# Patient Record
Sex: FEMALE | Race: WHITE | NOT HISPANIC OR LATINO | Employment: OTHER | URBAN - METROPOLITAN AREA
[De-identification: names, ages, dates, MRNs, and addresses within clinical notes are randomized per-mention and may not be internally consistent; named-entity substitution may affect disease eponyms.]

---

## 2017-07-06 ENCOUNTER — TELEPHONE (OUTPATIENT)
Dept: CARDIOLOGY | Facility: CLINIC | Age: 67
End: 2017-07-06

## 2017-07-06 NOTE — TELEPHONE ENCOUNTER
Pt's device is not in our [facility], active or inactive.  LMOM for Sasha @ [facility]. WE do have record of in-office checks.  Most recent 10/25/16.

## 2017-08-09 RX ORDER — FUROSEMIDE 80 MG
80 TABLET ORAL DAILY
Qty: 90 TABLET | Refills: 2 | Status: SHIPPED | OUTPATIENT
Start: 2017-08-09

## 2017-12-21 ENCOUNTER — OFFICE VISIT (OUTPATIENT)
Dept: RETAIL CLINIC | Facility: CLINIC | Age: 67
End: 2017-12-21

## 2017-12-21 VITALS
SYSTOLIC BLOOD PRESSURE: 146 MMHG | HEIGHT: 61 IN | HEART RATE: 72 BPM | TEMPERATURE: 98.1 F | DIASTOLIC BLOOD PRESSURE: 82 MMHG | BODY MASS INDEX: 30.78 KG/M2 | RESPIRATION RATE: 24 BRPM | WEIGHT: 163 LBS | OXYGEN SATURATION: 98 %

## 2017-12-21 DIAGNOSIS — R06.02 SHORTNESS OF BREATH: ICD-10-CM

## 2017-12-21 DIAGNOSIS — R68.89 FLU-LIKE SYMPTOMS: Primary | ICD-10-CM

## 2017-12-21 DIAGNOSIS — R05.9 COUGHING: ICD-10-CM

## 2017-12-21 LAB
EXPIRATION DATE: NORMAL
FLUAV AG NPH QL: NEGATIVE
FLUBV AG NPH QL: NEGATIVE
INTERNAL CONTROL: NORMAL
Lab: NORMAL

## 2017-12-21 PROCEDURE — 99213 OFFICE O/P EST LOW 20 MIN: CPT | Performed by: NURSE PRACTITIONER

## 2017-12-21 PROCEDURE — 87804 INFLUENZA ASSAY W/OPTIC: CPT | Performed by: NURSE PRACTITIONER

## 2017-12-21 RX ORDER — ALBUTEROL SULFATE 2.5 MG/3ML
2.5 SOLUTION RESPIRATORY (INHALATION) ONCE
Status: SHIPPED | OUTPATIENT
Start: 2017-12-21

## 2017-12-21 NOTE — PROGRESS NOTES
Subjective   Ludy Stcok is a 67 y.o. female presents with cough, chest congestion, sinus pressure, and shortness of breath.  Patient states thinks may have pneumonia because she has had in the past. States has been using inhaler with no relief and has ran out of medicine for neb machine.    URI    This is a new problem. The current episode started 1 to 4 weeks ago. The problem has been gradually worsening. Maximum temperature: subjective, has not checked. The fever has been present for 1 to 2 days. Associated symptoms include congestion, coughing (productive thick yellow sputum), ear pain (bilateral ear pressure/pain), headaches, sinus pain and wheezing (can hear self wheezing). Pertinent negatives include no abdominal pain, chest pain, diarrhea, dysuria, nausea, neck pain, rash, rhinorrhea, sneezing, sore throat, swollen glands or vomiting. She has tried NSAIDs and inhaler use for the symptoms. The treatment provided mild relief.   Cough   This is a new problem. The current episode started 1 to 4 weeks ago. The problem has been gradually worsening. The cough is productive of sputum. Associated symptoms include chills, ear pain (bilateral ear pressure/pain), a fever (subjective), headaches, myalgias, nasal congestion, postnasal drip, shortness of breath and wheezing (can hear self wheezing). Pertinent negatives include no chest pain, hemoptysis, rash, rhinorrhea or sore throat. Nothing aggravates the symptoms. She has tried a beta-agonist inhaler and OTC cough suppressant for the symptoms. The treatment provided mild relief. Her past medical history is significant for pneumonia.   Shortness of Breath   This is a new problem. The current episode started in the past 7 days. The problem has been gradually worsening. Associated symptoms include ear pain (bilateral ear pressure/pain), a fever (subjective), headaches, sputum production and wheezing (can hear self wheezing). Pertinent negatives include no abdominal  pain, chest pain, hemoptysis, leg pain, leg swelling, neck pain, rash, rhinorrhea, sore throat, swollen glands, syncope or vomiting. Nothing aggravates the symptoms. She has tried beta agonist inhalers for the symptoms. The treatment provided no relief. Her past medical history is significant for pneumonia.        The following portions of the patient's history were reviewed and updated as appropriate: allergies, current medications, past family history, past medical history, past social history and past surgical history.    Review of Systems   Constitutional: Positive for chills and fever (subjective). Negative for unexpected weight change.   HENT: Positive for congestion, ear pain (bilateral ear pressure/pain), postnasal drip, sinus pain and sinus pressure. Negative for rhinorrhea, sneezing, sore throat, trouble swallowing and voice change.    Eyes: Negative.    Respiratory: Positive for cough (productive thick yellow sputum), sputum production, chest tightness, shortness of breath and wheezing (can hear self wheezing). Negative for hemoptysis.    Cardiovascular: Negative.  Negative for chest pain, palpitations, leg swelling and syncope.   Gastrointestinal: Negative.  Negative for abdominal pain, diarrhea, nausea and vomiting.   Genitourinary: Negative.  Negative for dysuria.   Musculoskeletal: Positive for myalgias. Negative for neck pain.   Skin: Negative for rash.   Neurological: Positive for headaches. Negative for dizziness, syncope, light-headedness and numbness.       Objective   Physical Exam   Constitutional: She is oriented to person, place, and time. She appears well-developed and well-nourished. She has a sickly appearance.   HENT:   Head: Normocephalic and atraumatic.   Right Ear: A middle ear effusion is present.   Left Ear: A middle ear effusion is present.   Nose: Mucosal edema present.   Mouth/Throat: Uvula is midline and mucous membranes are normal. Posterior oropharyngeal erythema (mild erythema  with PND) present. Tonsils are 0 on the right. Tonsils are 0 on the left. No tonsillar exudate.   Eyes: Conjunctivae and lids are normal. Pupils are equal, round, and reactive to light.   Neck: Normal range of motion.   Cardiovascular: Normal rate, regular rhythm and normal heart sounds.    Pulmonary/Chest: Tachypnea noted. She has decreased breath sounds in the right middle field, the right lower field, the left middle field and the left lower field. She has wheezes (audible wheezing noted, wheezing throughout lungs before and after neb tx) in the right upper field, the right middle field, the right lower field, the left upper field, the left middle field and the left lower field. She has rhonchi in the right middle field, the right lower field, the left middle field and the left lower field.   Neb treatment given with no improvement in lung sounds or air movement.  Patient in no distress in clinic, Sats 98% on RA   Lymphadenopathy:     She has no cervical adenopathy.   Neurological: She is alert and oriented to person, place, and time.   Skin: Skin is warm and dry.   Psychiatric: She has a normal mood and affect. Her speech is normal and behavior is normal.       Assessment/Plan   Ludy was seen today for uri, cough and shortness of breath.    Diagnoses and all orders for this visit:    Flu-like symptoms  -     POCT Influenza A/B  Results for orders placed or performed in visit on 12/21/17   POCT Influenza A/B   Result Value Ref Range    Rapid Influenza A Ag NEGATIVE     Rapid Influenza B Ag NEGATIVE     Internal Control Passed Passed    Lot Number [id number]     Expiration Date 09717          Shortness of breath  -     albuterol (PROVENTIL) nebulizer solution 0.083% 2.5 mg/3mL; Take 2.5 mg by nebulization 1 (One) Time.    Coughing    No medications prescribed except for neb tx given in clinic.  Due to patient's history, current presentation and assessment instructed on need to go to ER straight from clinic(patient  refused to go by ambulance, daughter here to transport).  Vital signs RR 24, HR 88, Sats 98% RA when leaving clinic.  12:30 MA walked with patient to car, daughter present, patient stable. Patient and daughter understand the importance of going straight to ER from clinic.  Called and spoke to Abena in ER regarding patient.    Tejal Sands, APRN

## 2022-08-20 ENCOUNTER — HOSPITAL ENCOUNTER (EMERGENCY)
Facility: HOSPITAL | Age: 72
Discharge: HOME | End: 2022-08-20
Attending: EMERGENCY MEDICINE | Admitting: EMERGENCY MEDICINE

## 2022-08-20 VITALS
HEIGHT: 60 IN | BODY MASS INDEX: 29.45 KG/M2 | SYSTOLIC BLOOD PRESSURE: 132 MMHG | WEIGHT: 150 LBS | OXYGEN SATURATION: 94 % | DIASTOLIC BLOOD PRESSURE: 80 MMHG | RESPIRATION RATE: 18 BRPM | HEART RATE: 70 BPM

## 2022-08-20 DIAGNOSIS — R25.2 LEG CRAMPS: Primary | ICD-10-CM

## 2022-08-20 LAB
ALBUMIN SERPL-MCNC: 4.4 G/DL (ref 3.5–5.2)
ALBUMIN/GLOB SERPL: 3.1 G/DL
ALP SERPL-CCNC: 69 U/L (ref 39–117)
ALT SERPL W P-5'-P-CCNC: 17 U/L (ref 1–33)
ANION GAP SERPL CALCULATED.3IONS-SCNC: 10 MMOL/L (ref 5–15)
AST SERPL-CCNC: 22 U/L (ref 1–32)
BASOPHILS # BLD AUTO: 0.08 10*3/MM3 (ref 0–0.2)
BASOPHILS NFR BLD AUTO: 1.2 % (ref 0–1.5)
BILIRUB SERPL-MCNC: 0.3 MG/DL (ref 0–1.2)
BUN SERPL-MCNC: 15 MG/DL (ref 8–23)
BUN/CREAT SERPL: 18.5 (ref 7–25)
CALCIUM SPEC-SCNC: 9.3 MG/DL (ref 8.6–10.5)
CHLORIDE SERPL-SCNC: 107 MMOL/L (ref 98–107)
CK SERPL-CCNC: 115 U/L (ref 20–180)
CO2 SERPL-SCNC: 26 MMOL/L (ref 22–29)
CREAT SERPL-MCNC: 0.81 MG/DL (ref 0.57–1)
DEPRECATED RDW RBC AUTO: 42.5 FL (ref 37–54)
EGFRCR SERPLBLD CKD-EPI 2021: 77.2 ML/MIN/1.73
EOSINOPHIL # BLD AUTO: 0.33 10*3/MM3 (ref 0–0.4)
EOSINOPHIL NFR BLD AUTO: 5.1 % (ref 0.3–6.2)
ERYTHROCYTE [DISTWIDTH] IN BLOOD BY AUTOMATED COUNT: 13 % (ref 12.3–15.4)
GLOBULIN UR ELPH-MCNC: 1.4 GM/DL
GLUCOSE SERPL-MCNC: 104 MG/DL (ref 65–99)
HCT VFR BLD AUTO: 39.4 % (ref 34–46.6)
HGB BLD-MCNC: 13.1 G/DL (ref 12–15.9)
HOLD SPECIMEN: NORMAL
IMM GRANULOCYTES # BLD AUTO: 0.01 10*3/MM3 (ref 0–0.05)
IMM GRANULOCYTES NFR BLD AUTO: 0.2 % (ref 0–0.5)
LYMPHOCYTES # BLD AUTO: 2.13 10*3/MM3 (ref 0.7–3.1)
LYMPHOCYTES NFR BLD AUTO: 33 % (ref 19.6–45.3)
MAGNESIUM SERPL-MCNC: 1.9 MG/DL (ref 1.6–2.4)
MCH RBC QN AUTO: 29.7 PG (ref 26.6–33)
MCHC RBC AUTO-ENTMCNC: 33.2 G/DL (ref 31.5–35.7)
MCV RBC AUTO: 89.3 FL (ref 79–97)
MONOCYTES # BLD AUTO: 0.72 10*3/MM3 (ref 0.1–0.9)
MONOCYTES NFR BLD AUTO: 11.1 % (ref 5–12)
NEUTROPHILS NFR BLD AUTO: 3.19 10*3/MM3 (ref 1.7–7)
NEUTROPHILS NFR BLD AUTO: 49.4 % (ref 42.7–76)
NRBC BLD AUTO-RTO: 0 /100 WBC (ref 0–0.2)
PLATELET # BLD AUTO: 277 10*3/MM3 (ref 140–450)
PMV BLD AUTO: 9.8 FL (ref 6–12)
POTASSIUM SERPL-SCNC: 3.9 MMOL/L (ref 3.5–5.2)
PROT SERPL-MCNC: 5.8 G/DL (ref 6–8.5)
QT INTERVAL: 470 MS
QTC INTERVAL: 507 MS
RBC # BLD AUTO: 4.41 10*6/MM3 (ref 3.77–5.28)
SODIUM SERPL-SCNC: 143 MMOL/L (ref 136–145)
TROPONIN T SERPL-MCNC: <0.01 NG/ML (ref 0–0.03)
WBC NRBC COR # BLD: 6.46 10*3/MM3 (ref 3.4–10.8)
WHOLE BLOOD HOLD COAG: NORMAL
WHOLE BLOOD HOLD SPECIMEN: NORMAL

## 2022-08-20 PROCEDURE — 84484 ASSAY OF TROPONIN QUANT: CPT | Performed by: EMERGENCY MEDICINE

## 2022-08-20 PROCEDURE — 93005 ELECTROCARDIOGRAM TRACING: CPT | Performed by: EMERGENCY MEDICINE

## 2022-08-20 PROCEDURE — 85025 COMPLETE CBC W/AUTO DIFF WBC: CPT | Performed by: EMERGENCY MEDICINE

## 2022-08-20 PROCEDURE — 99284 EMERGENCY DEPT VISIT MOD MDM: CPT

## 2022-08-20 PROCEDURE — 82550 ASSAY OF CK (CPK): CPT | Performed by: EMERGENCY MEDICINE

## 2022-08-20 PROCEDURE — 83735 ASSAY OF MAGNESIUM: CPT | Performed by: EMERGENCY MEDICINE

## 2022-08-20 PROCEDURE — 80053 COMPREHEN METABOLIC PANEL: CPT | Performed by: EMERGENCY MEDICINE

## 2022-08-20 RX ORDER — POTASSIUM CHLORIDE 750 MG/1
10 TABLET, FILM COATED, EXTENDED RELEASE ORAL 2 TIMES DAILY
Qty: 6 TABLET | Refills: 0 | Status: SHIPPED | OUTPATIENT
Start: 2022-08-20 | End: 2022-08-20 | Stop reason: SDUPTHER

## 2022-08-20 RX ORDER — POTASSIUM CHLORIDE 750 MG/1
20 CAPSULE, EXTENDED RELEASE ORAL ONCE
Status: COMPLETED | OUTPATIENT
Start: 2022-08-20 | End: 2022-08-20

## 2022-08-20 RX ORDER — POTASSIUM CHLORIDE 750 MG/1
10 TABLET, FILM COATED, EXTENDED RELEASE ORAL 2 TIMES DAILY
Qty: 6 TABLET | Refills: 0 | Status: SHIPPED | OUTPATIENT
Start: 2022-08-20 | End: 2022-08-23

## 2022-08-20 RX ORDER — GABAPENTIN 300 MG/1
300 CAPSULE ORAL 3 TIMES DAILY
COMMUNITY
End: 2023-03-22

## 2022-08-20 RX ORDER — MULTIPLE VITAMINS W/ MINERALS TAB 9MG-400MCG
1 TAB ORAL DAILY
COMMUNITY

## 2022-08-20 RX ORDER — SODIUM CHLORIDE 0.9 % (FLUSH) 0.9 %
10 SYRINGE (ML) INJECTION AS NEEDED
Status: DISCONTINUED | OUTPATIENT
Start: 2022-08-20 | End: 2022-08-20 | Stop reason: HOSPADM

## 2022-08-20 RX ORDER — BUPROPION HYDROCHLORIDE 300 MG/1
300 TABLET ORAL DAILY
Status: ON HOLD | COMMUNITY
End: 2023-03-30

## 2022-08-20 RX ADMIN — POTASSIUM CHLORIDE 20 MEQ: 750 CAPSULE, EXTENDED RELEASE ORAL at 10:57

## 2022-08-20 RX ADMIN — SODIUM CHLORIDE 500 ML: 9 INJECTION, SOLUTION INTRAVENOUS at 09:21

## 2022-08-20 NOTE — ED PROVIDER NOTES
Subjective   71-year-old female with a history of fibromyalgia presents for evaluation of leg cramps.  She states that her leg cramps started early this morning and were quite severe.  She states that she has had similar episodes before in the past with hypokalemia and dehydration.  She states that her legs became painful and were spasming uncontrollably to the point that she had to call EMS.  She was given IV fluids in route which seemed to help with her pain and her cramps.  She currently rates her pain at 8 out of 10 in severity but notes that it was 10 out of 10 in severity at its worst.  No paresthesias.  She states that the pain was so severe this morning that it caused her to fall to the ground, resulting in a superficial abrasion to the lateral aspect of her left lower leg.  Tetanus is up-to-date.          Review of Systems   Musculoskeletal: Positive for myalgias.   Skin: Positive for wound.   All other systems reviewed and are negative.      Past Medical History:   Diagnosis Date   • Coronary artery disease    • Crohn's disease, small and large intestine ([facility])    • DVT (deep venous thrombosis) ([facility]) 1995   • Fibromyalgia    • Heart disease    • Hypertension    • Parathyroid disease ([facility])     status post parathyroidectomy.   • Peptic ulcer disease    • PTSD (post-traumatic stress disorder)    • Pulmonary hypertension ([facility])    • Symptomatic bradycardia    • Systemic lupus erythematosus ([facility])        No Known Allergies    Past Surgical History:   Procedure Laterality Date   • BLADDER RESECTION LAPAROSCOPIC     • BREAST LUMPECTOMY     • BUNIONECTOMY      BILATERAL    • CARPAL TUNNEL RELEASE Right    • COLON RESECTION      Partial x2   • COSMETIC SURGERY      Bilateral breast implants   • HYSTERECTOMY     • TONSILLECTOMY         History reviewed. No pertinent family history.    Social History     Socioeconomic History   • Marital status:    Tobacco Use   • Smoking status: Never Smoker   Substance and  Sexual Activity   • Alcohol use: No   • Drug use: No   • Sexual activity: Defer           Objective   Physical Exam  Vitals and nursing note reviewed.   Constitutional:       Appearance: She is well-developed. She is not diaphoretic.      Comments: Nontoxic-appearing female   HENT:      Head: Normocephalic and atraumatic.      Comments: No mucous membrane lesions  Cardiovascular:      Rate and Rhythm: Normal rate and regular rhythm.      Heart sounds: Normal heart sounds. No murmur heard.    No friction rub. No gallop.   Pulmonary:      Effort: Pulmonary effort is normal. No respiratory distress.      Breath sounds: Normal breath sounds. No wheezing or rales.   Musculoskeletal:         General: Normal range of motion.      Right lower leg: No edema.      Left lower leg: No edema.   Skin:     General: Skin is warm and dry.      Findings: No erythema or rash.   Neurological:      Mental Status: She is alert and oriented to person, place, and time.      Comments: Neurovascularly intact distally in both lower extremities with bounding distal pulses and normal sensation, no clonus, normoreflexic   Psychiatric:         Mood and Affect: Mood normal.         Thought Content: Thought content normal.         Judgment: Judgment normal.         Procedures           ED Course  ED Course as of 08/20/22 1558   Sat Aug 20, 2022   0934 71-year-old female presents via EMS complaining of severe leg cramps this morning that she attributes to hypokalemia.  On arrival to the ED, the patient is nontoxic-appearing.  Benign exam.  We will obtain labs, and we will reassess following initial interventions. [DD]   1032 Labs are unrevealing.  Upon reevaluation, the patient looks and feels markedly improved following IV fluids.  Her potassium is on the low side of normal range.  Patient reassured.  Encouraged increased dietary potassium intake over the next several days.  We will discharge her home with a prescription for oral potassium  replacement for 3 days as well.  Agreeable with plan and given appropriate strict return precautions. [DD]      ED Course User Index  [DD] Garcia Velasquez MD                                          Recent Results (from the past 24 hour(s))   ECG 12 Lead    Collection Time: 08/20/22  9:16 AM   Result Value Ref Range    QT Interval 470 ms    QTC Interval 507 ms   Lavender Top    Collection Time: 08/20/22  9:20 AM   Result Value Ref Range    Extra Tube hold for add-on    Gray Top    Collection Time: 08/20/22  9:20 AM   Result Value Ref Range    Extra Tube Hold for add-ons.    Light Blue Top    Collection Time: 08/20/22  9:20 AM   Result Value Ref Range    Extra Tube Hold for add-ons.    CBC Auto Differential    Collection Time: 08/20/22  9:20 AM    Specimen: Blood   Result Value Ref Range    WBC 6.46 3.40 - 10.80 10*3/mm3    RBC 4.41 3.77 - 5.28 10*6/mm3    Hemoglobin 13.1 12.0 - 15.9 g/dL    Hematocrit 39.4 34.0 - 46.6 %    MCV 89.3 79.0 - 97.0 fL    MCH 29.7 26.6 - 33.0 pg    MCHC 33.2 31.5 - 35.7 g/dL    RDW 13.0 12.3 - 15.4 %    RDW-SD 42.5 37.0 - 54.0 fl    MPV 9.8 6.0 - 12.0 fL    Platelets 277 140 - 450 10*3/mm3    Neutrophil % 49.4 42.7 - 76.0 %    Lymphocyte % 33.0 19.6 - 45.3 %    Monocyte % 11.1 5.0 - 12.0 %    Eosinophil % 5.1 0.3 - 6.2 %    Basophil % 1.2 0.0 - 1.5 %    Immature Grans % 0.2 0.0 - 0.5 %    Neutrophils, Absolute 3.19 1.70 - 7.00 10*3/mm3    Lymphocytes, Absolute 2.13 0.70 - 3.10 10*3/mm3    Monocytes, Absolute 0.72 0.10 - 0.90 10*3/mm3    Eosinophils, Absolute 0.33 0.00 - 0.40 10*3/mm3    Basophils, Absolute 0.08 0.00 - 0.20 10*3/mm3    Immature Grans, Absolute 0.01 0.00 - 0.05 10*3/mm3    nRBC 0.0 0.0 - 0.2 /100 WBC   CK    Collection Time: 08/20/22  9:45 AM    Specimen: Blood   Result Value Ref Range    Creatine Kinase 115 20 - 180 U/L   Magnesium    Collection Time: 08/20/22  9:45 AM    Specimen: Blood   Result Value Ref Range    Magnesium 1.9 1.6 - 2.4 mg/dL   Comprehensive  "Metabolic Panel    Collection Time: 08/20/22  9:45 AM    Specimen: Blood   Result Value Ref Range    Glucose 104 (H) 65 - 99 mg/dL    BUN 15 8 - 23 mg/dL    Creatinine 0.81 0.57 - 1.00 mg/dL    Sodium 143 136 - 145 mmol/L    Potassium 3.9 3.5 - 5.2 mmol/L    Chloride 107 98 - 107 mmol/L    CO2 26.0 22.0 - 29.0 mmol/L    Calcium 9.3 8.6 - 10.5 mg/dL    Total Protein 5.8 (L) 6.0 - 8.5 g/dL    Albumin 4.40 3.50 - 5.20 g/dL    ALT (SGPT) 17 1 - 33 U/L    AST (SGOT) 22 1 - 32 U/L    Alkaline Phosphatase 69 39 - 117 U/L    Total Bilirubin 0.3 0.0 - 1.2 mg/dL    Globulin 1.4 gm/dL    A/G Ratio 3.1 g/dL    BUN/Creatinine Ratio 18.5 7.0 - 25.0    Anion Gap 10.0 5.0 - 15.0 mmol/L    eGFR 77.2 >60.0 mL/min/1.73   Troponin    Collection Time: 08/20/22  9:45 AM    Specimen: Blood   Result Value Ref Range    Troponin T <0.010 0.000 - 0.030 ng/mL   Green Top (Gel)    Collection Time: 08/20/22  9:45 AM   Result Value Ref Range    Extra Tube Hold for add-ons.    Gold Top - SST    Collection Time: 08/20/22  9:45 AM   Result Value Ref Range    Extra Tube Hold for add-ons.      Note: In addition to lab results from this visit, the labs listed above may include labs taken at another facility or during a different encounter within the last 24 hours. Please correlate lab times with ED admission and discharge times for further clarification of the services performed during this visit.    No orders to display     Vitals:    08/20/22 0911 08/20/22 0914 08/20/22 0930 08/20/22 1030   BP: 165/99  147/76 132/80   BP Location: Right arm      Patient Position: Lying      Pulse: 70  70 70   Resp: 18      SpO2: 96%  91% 94%   Weight:  68 kg (150 lb)     Height:  152.4 cm (60\")       Medications   sodium chloride 0.9 % bolus 500 mL (0 mL Intravenous Stopped 8/20/22 1045)   potassium chloride (MICRO-K) CR capsule 20 mEq (20 mEq Oral Given 8/20/22 1057)     ECG/EMG Results (last 24 hours)     ** No results found for the last 24 hours. **        ECG 12 " Lead   Final Result   Test Reason : Syncope triage protocol   Blood Pressure :   */*   mmHG   Vent. Rate :  70 BPM     Atrial Rate :  70 BPM      P-R Int : 190 ms          QRS Dur : 102 ms       QT Int : 470 ms       P-R-T Axes :  61 -32 184 degrees      QTc Int : 507 ms      Atrial-paced rhythm   Left axis deviation   ST & T wave abnormality, consider anterior ischemia   Abnormal ECG   When compared with ECG of 23-SEP-2015 12:15,   T wave inversion more evident in Anterior leads   Confirmed by MD Velasquez Michael ([id number]) on 8/20/2022 3:50:45 PM      Referred By: EDMD           Confirmed By: Cheo Velasquez MD              [facility]    Final diagnoses:   Leg cramps       ED Disposition  ED Disposition     ED Disposition   Discharge    Condition   Stable    Comment   --             PATIENT CONNECTION - [place] [address]  [phone]  In 1 week           Medication List      New Prescriptions    potassium chloride 10 MEQ CR tablet  Take 1 tablet by mouth 2 (Two) Times a Day for 3 days.           Where to Get Your Medications      These medications were sent to [facility] [address] [place] [address] [address] [facility] [phone] [place] [phone]   [address] [facility] [address]    Phone: [phone]   · potassium chloride 10 MEQ CR tablet          Garcia Velasquez MD  08/20/22 8118

## 2023-03-21 NOTE — PROGRESS NOTES
Subjective:     Encounter Date:03/22/2023    Primary Care Physician: Provider, No Known      Patient ID: Ludy Stock is a 72 y.o. female.    Chief Complaint:Establish Care (For last month having chest pain on and off), Chest Pain, and Shortness of Breath    PROBLEM LIST:   1. Symptomatic bradycardia:  a.  Status post dual-chamber [employer] Sawyer pacemaker, 01/04/2010.    b. Palpitations associated with syncopal episode in October 2009.  c. Event recorder, November 2009, revealing sinus bradycardia with heart rates as low as 38 (during waking hours).  d. 3/2023 generator at [facility]  2. Coronary artery disease:  a. History of cardiac catheterizations, 2000 and 2003, and 2011.  b. Reported PCI at [facility], 2011.  c. Pharmacologic MPS, 03/10/2015: Normal perfusion.   Normal LVEF.  d. 2009 3.5 x 32 stent to RCA performed in [facility]  e. 12/2022 normal MPS (Dr. Samayoa's office)  3. Pulmonary hypertension:  a. Secondary to Fen-Phen use.  b. Echocardiogram, 10/28/2009: Normal LVEF, RVSP 32 mmHg.  c. Echocardiogram,  03/10/2015:  Normal.  4. Hypertension  5. Dyslipidemia  6. Reported h/o Atrial fibrillation  1. On Eliquis, CHADSVASC 5  7. Right breast cancer  1. Treated with lumpectomy  8. Systemic lupus erythematosus.  9. Fibromyalgia.  10.  History of peptic ulcer disease.  11. Crohn’s disease.  12. History of DVT in 1995.  13. PTSD.  14. History of parathyroid disease, status post parathyroidectomy.  15. Chronic constipation  16. Nephrolithiasis  17. Hypothyroidism  18. Surgical history:  a. Partial colon resection x2.  b. Bilateral bunionectomy.  c. Tonsillectomy.  d. Hysterectomy.  e. Bladder resection.  f. Right carpal tunnel repair.  g. Benign  breast lumpectomy.  h. Bilateral breast implants  i. Cholecystectomy  j. Right rotator cuff repair      No Known Allergies      Current Outpatient Medications:   •  buPROPion XL (WELLBUTRIN XL) 300 MG 24 hr tablet, Take 1 tablet by mouth Daily., Disp: , Rfl:   •   Coenzyme Q10 (CoQ-10) 100 MG capsule, Take  by mouth Daily., Disp: , Rfl:   •  diclofenac (VOLTAREN) 1 % gel gel, Place  on the skin As Needed., Disp: , Rfl:   •  furosemide (LASIX) 80 MG tablet, Take 1 tablet by mouth Daily., Disp: 90 tablet, Rfl: 2  •  levothyroxine (SYNTHROID, LEVOTHROID) 175 MCG tablet, Take  by mouth Daily., Disp: , Rfl:   •  linaclotide (LINZESS) 290 MCG capsule capsule, Take 1 capsule by mouth Every Morning Before Breakfast., Disp: , Rfl:   •  lisinopril (PRINIVIL,ZESTRIL) 20 MG tablet, Take 1 tablet by mouth Daily., Disp: , Rfl:   •  metoprolol succinate XL (TOPROL-XL) 50 MG 24 hr tablet, Take 1 tablet by mouth Daily., Disp: 90 tablet, Rfl: 2  •  multivitamin with minerals tablet tablet, Take 1 tablet by mouth Daily., Disp: , Rfl:   •  nitroglycerin (NITROSTAT) 0.4 MG SL tablet, Place  under the tongue As Needed., Disp: , Rfl:   •  omeprazole (priLOSEC) 40 MG capsule, Take 1 capsule by mouth Daily., Disp: , Rfl:   •  oxyCODONE (OxyCONTIN) 10 MG 12 hr tablet, Take 1 tablet by mouth 3 (Three) Times a Day., Disp: , Rfl:   •  oxyMORphone HCl ER 7.5 MG tablet extended-release 12 hour, Take  by mouth 2 (Two) Times a Day., Disp: , Rfl:   •  potassium chloride (K-DUR,KLOR-CON) 20 MEQ CR tablet, Take 1 tablet by mouth As Needed., Disp: , Rfl:   •  aspirin 81 MG EC tablet, Take  by mouth Daily. (Patient not taking: Reported on 3/22/2023), Disp: , Rfl:     Current Facility-Administered Medications:   •  albuterol (PROVENTIL) nebulizer solution 0.083% 2.5 mg/3mL, 2.5 mg, Nebulization, Once, Tejal Sands, ESTHER        History of Present Illness    Patient is a 72-year-old  female who we are seeing today for establishment of cardiac care as well as management of her pacemaker.  Patient's previous history of coronary disease with previous stents.  She underwent her most recent intervention in 2019 with stent to the RCA performed in [place].  Patient notes that 2 years ago she was told that she  needed a heart catheterization.  Chest pain with any further testing secondary to caring for her  who is very ill and requires lots of care.  Complains of extreme fatigue as well as shortness of breath.  Within the last year or so this has been limiting her ability to take care of her .  Also complains of intermittent chest heaviness.  No specific triggering factors noted.  Notes that last night she was having a nightmare and during that she was having a heart attack and woke up with chest heaviness.  No reported syncope or near syncope.    The following portions of the patient's history were reviewed and updated as appropriate: allergies, current medications, past family history, past medical history, past social history, past surgical history and problem list.    Family History   Problem Relation Age of Onset   • Heart attack Father        Social History     Tobacco Use   • Smoking status: Never   Substance Use Topics   • Alcohol use: No   • Drug use: No         Review of Systems   Constitutional: Positive for malaise/fatigue and weight gain. Negative for fever.   HENT: Negative for hoarse voice, nosebleeds and tinnitus.    Eyes: Positive for blurred vision and visual halos. Negative for pain and visual disturbance.   Cardiovascular: Positive for chest pain, leg swelling, orthopnea and palpitations. Negative for claudication, cyanosis, dyspnea on exertion, irregular heartbeat, near-syncope, paroxysmal nocturnal dyspnea and syncope.   Respiratory: Positive for shortness of breath and snoring. Negative for cough, hemoptysis, sleep disturbances due to breathing, sputum production and wheezing.    Endocrine: Positive for cold intolerance and polyphagia. Negative for polydipsia and polyuria.   Hematologic/Lymphatic: Negative for bleeding problem. Does not bruise/bleed easily.   Skin: Negative for itching, rash and skin cancer.   Musculoskeletal: Positive for arthritis, muscle weakness and myalgias.  "Negative for back pain, joint pain and joint swelling.   Gastrointestinal: Positive for abdominal pain, change in bowel habit, dysphagia, nausea and vomiting. Negative for anorexia, constipation, diarrhea, heartburn, hematemesis, hematochezia and melena.   Genitourinary: Positive for bladder incontinence, dysuria, hematuria and urgency.   Neurological: Positive for excessive daytime sleepiness, dizziness, headaches and loss of balance. Negative for disturbances in coordination, focal weakness, light-headedness, numbness, seizures, vertigo and weakness.   Psychiatric/Behavioral: Positive for depression and memory loss. Negative for altered mental status. The patient is nervous/anxious.    Allergic/Immunologic: Negative for hives and persistent infections.          Objective:   /82 (BP Location: Right arm, Patient Position: Sitting)   Pulse 62   Ht 152.4 cm (60\")   Wt 77.8 kg (171 lb 8 oz)   SpO2 95%   BMI 33.49 kg/m²         Vitals reviewed.   Constitutional:       Appearance: Healthy appearance. Well-developed and not in distress.   Eyes:      Conjunctiva/sclera: Conjunctivae normal.      Pupils: Pupils are equal, round, and reactive to light.   HENT:      Head: Normocephalic and atraumatic.    Mouth/Throat:      Pharynx: Oropharynx is clear.   Neck:      Thyroid: Thyroid normal. No thyromegaly.      Vascular: Normal carotid pulses. No carotid bruit or JVD. JVD normal.      Lymphadenopathy: No cervical adenopathy.   Pulmonary:      Effort: No respiratory distress.      Breath sounds: No wheezing. No rales.   Chest:      Chest wall: Not tender to palpatation.   Cardiovascular:      Normal rate. Regular rhythm.      No gallop.   Pulses:     Carotid: 2+ bilaterally.     Dorsalis pedis: 2+ bilaterally.     Posterior tibial: 2+ bilaterally.  Abdominal:      General: There is no distension or abdominal bruit.      Palpations: There is no abdominal mass.      Tenderness: There is no abdominal tenderness. There " "is no rebound.   Musculoskeletal:         General: No tenderness or deformity.      Extremities: No clubbing present.Skin:     General: Skin is warm and dry. There is no cyanosis.      Findings: No rash.   Neurological:      Mental Status: Alert, oriented to person, place, and time and oriented to person, place and time.           ECG 12 Lead    Date/Time: 3/22/2023 12:31 PM  Performed by: Jake Rachel MD  Authorized by: Jake Rachel MD   Comparison: compared with previous ECG from 8/20/2022  Similar to previous ECG  Rhythm: sinus rhythm and paced  Conduction: non-specific intraventricular conduction delay  Other findings: non-specific ST-T wave changes    Clinical impression: abnormal EKG          Device check:  [employer] dual-chamber permanent pacemaker.  However RV lead currently off.  Normal functioning atrial lead.  Battery voltage 2.59 V.  SUMAYA at 2.60 V.  Underlying rhythm sinus.  No events.  No changes.  Atrially paced 88%.        Assessment:   Assessment & Plan      Diagnoses and all orders for this visit:    1. Coronary artery disease involving native coronary artery of native heart with other form of angina pectoris (HCC) (Primary)  -     ECG 12 Lead      1.  Coronary artery disease status post remote PCI's.  Dyspnea on exertion.  No exertional chest pain.  Negative perfusion study 2022  2.  Atypical resting chest and throat pain.  Possibly esophageal spasm versus atypical angina  3.  Sick sinus syndrome, with SUMAYA pacemaker.  4.  RV lead turned off due to \"symptoms 1 functioning\".  Unclear etiology.  Will need evaluated.  5.  Dyslipidemia currently off statin.  Was previously on atorvastatin.  6.  Hypertension controlled on lisinopril and metoprolol    Recommendations:  1.  Generator change or as soon as possible.  She has been SUMAYA now for over 3 months.  2.  Resume atorvastatin 40 mg nightly  3.  Continue other current medical therapy (resume aspirin 81 mg daily).  4.  Reevaluate patient's " symptoms of dyspnea and chest pain after her generator change.  5.  We will have RV lead interrogated at time of generator change to see if revision is necessary         Elvi SANTANA scribed portions of this dictation for Dr. Jake Rachel.     I have seen and examined the patient, I have reviewed the note, discussed the case with the advance practice clinician, made necessary changes and I agree with the final note.    Jake Rachel MD  03/22/23  12:48 EDT              Dictated utilizing Dragon dictation

## 2023-03-21 NOTE — H&P (VIEW-ONLY)
Subjective:     Encounter Date:03/22/2023    Primary Care Physician: Provider, No Known      Patient ID: Ludy Stock is a 72 y.o. female.    Chief Complaint:Establish Care (For last month having chest pain on and off), Chest Pain, and Shortness of Breath    PROBLEM LIST:   1. Symptomatic bradycardia:  a.  Status post dual-chamber [employer]yer pacemaker, 01/04/2010.    b. Palpitations associated with syncopal episode in October 2009.  c. Event recorder, November 2009, revealing sinus bradycardia with heart rates as low as 38 (during waking hours).  d. 3/2023 generator at [facility]  2. Coronary artery disease:  a. History of cardiac catheterizations, 2000 and 2003, and 2011.  b. Reported PCI at [facility], 2011.  c. Pharmacologic MPS, 03/10/2015: Normal perfusion.   Normal LVEF.  d. 2009 3.5 x 32 stent to RCA performed in [facility]  e. 12/2022 normal MPS (Dr. Samayoa's office)  3. Pulmonary hypertension:  a. Secondary to Fen-Phen use.  b. Echocardiogram, 10/28/2009: Normal LVEF, RVSP 32 mmHg.  c. Echocardiogram,  03/10/2015:  Normal.  4. Hypertension  5. Dyslipidemia  6. Reported h/o Atrial fibrillation  1. On Eliquis, CHADSVASC 5  7. Right breast cancer  1. Treated with lumpectomy  8. Systemic lupus erythematosus.  9. Fibromyalgia.  10.  History of peptic ulcer disease.  11. Crohn’s disease.  12. History of DVT in 1995.  13. PTSD.  14. History of parathyroid disease, status post parathyroidectomy.  15. Chronic constipation  16. Nephrolithiasis  17. Hypothyroidism  18. Surgical history:  a. Partial colon resection x2.  b. Bilateral bunionectomy.  c. Tonsillectomy.  d. Hysterectomy.  e. Bladder resection.  f. Right carpal tunnel repair.  g. Benign  breast lumpectomy.  h. Bilateral breast implants  i. Cholecystectomy  j. Right rotator cuff repair      No Known Allergies      Current Outpatient Medications:   •  buPROPion XL (WELLBUTRIN XL) 300 MG 24 hr tablet, Take 1 tablet by mouth Daily., Disp: , Rfl:   •   Coenzyme Q10 (CoQ-10) 100 MG capsule, Take  by mouth Daily., Disp: , Rfl:   •  diclofenac (VOLTAREN) 1 % gel gel, Place  on the skin As Needed., Disp: , Rfl:   •  furosemide (LASIX) 80 MG tablet, Take 1 tablet by mouth Daily., Disp: 90 tablet, Rfl: 2  •  levothyroxine (SYNTHROID, LEVOTHROID) 175 MCG tablet, Take  by mouth Daily., Disp: , Rfl:   •  linaclotide (LINZESS) 290 MCG capsule capsule, Take 1 capsule by mouth Every Morning Before Breakfast., Disp: , Rfl:   •  lisinopril (PRINIVIL,ZESTRIL) 20 MG tablet, Take 1 tablet by mouth Daily., Disp: , Rfl:   •  metoprolol succinate XL (TOPROL-XL) 50 MG 24 hr tablet, Take 1 tablet by mouth Daily., Disp: 90 tablet, Rfl: 2  •  multivitamin with minerals tablet tablet, Take 1 tablet by mouth Daily., Disp: , Rfl:   •  nitroglycerin (NITROSTAT) 0.4 MG SL tablet, Place  under the tongue As Needed., Disp: , Rfl:   •  omeprazole (priLOSEC) 40 MG capsule, Take 1 capsule by mouth Daily., Disp: , Rfl:   •  oxyCODONE (OxyCONTIN) 10 MG 12 hr tablet, Take 1 tablet by mouth 3 (Three) Times a Day., Disp: , Rfl:   •  oxyMORphone HCl ER 7.5 MG tablet extended-release 12 hour, Take  by mouth 2 (Two) Times a Day., Disp: , Rfl:   •  potassium chloride (K-DUR,KLOR-CON) 20 MEQ CR tablet, Take 1 tablet by mouth As Needed., Disp: , Rfl:   •  aspirin 81 MG EC tablet, Take  by mouth Daily. (Patient not taking: Reported on 3/22/2023), Disp: , Rfl:     Current Facility-Administered Medications:   •  albuterol (PROVENTIL) nebulizer solution 0.083% 2.5 mg/3mL, 2.5 mg, Nebulization, Once, Tejal Sands, ESTHER        History of Present Illness    Patient is a 72-year-old  female who we are seeing today for establishment of cardiac care as well as management of her pacemaker.  Patient's previous history of coronary disease with previous stents.  She underwent her most recent intervention in 2019 with stent to the RCA performed in [place].  Patient notes that 2 years ago she was told that she  needed a heart catheterization.  Chest pain with any further testing secondary to caring for her  who is very ill and requires lots of care.  Complains of extreme fatigue as well as shortness of breath.  Within the last year or so this has been limiting her ability to take care of her .  Also complains of intermittent chest heaviness.  No specific triggering factors noted.  Notes that last night she was having a nightmare and during that she was having a heart attack and woke up with chest heaviness.  No reported syncope or near syncope.    The following portions of the patient's history were reviewed and updated as appropriate: allergies, current medications, past family history, past medical history, past social history, past surgical history and problem list.    Family History   Problem Relation Age of Onset   • Heart attack Father        Social History     Tobacco Use   • Smoking status: Never   Substance Use Topics   • Alcohol use: No   • Drug use: No         Review of Systems   Constitutional: Positive for malaise/fatigue and weight gain. Negative for fever.   HENT: Negative for hoarse voice, nosebleeds and tinnitus.    Eyes: Positive for blurred vision and visual halos. Negative for pain and visual disturbance.   Cardiovascular: Positive for chest pain, leg swelling, orthopnea and palpitations. Negative for claudication, cyanosis, dyspnea on exertion, irregular heartbeat, near-syncope, paroxysmal nocturnal dyspnea and syncope.   Respiratory: Positive for shortness of breath and snoring. Negative for cough, hemoptysis, sleep disturbances due to breathing, sputum production and wheezing.    Endocrine: Positive for cold intolerance and polyphagia. Negative for polydipsia and polyuria.   Hematologic/Lymphatic: Negative for bleeding problem. Does not bruise/bleed easily.   Skin: Negative for itching, rash and skin cancer.   Musculoskeletal: Positive for arthritis, muscle weakness and myalgias.  "Negative for back pain, joint pain and joint swelling.   Gastrointestinal: Positive for abdominal pain, change in bowel habit, dysphagia, nausea and vomiting. Negative for anorexia, constipation, diarrhea, heartburn, hematemesis, hematochezia and melena.   Genitourinary: Positive for bladder incontinence, dysuria, hematuria and urgency.   Neurological: Positive for excessive daytime sleepiness, dizziness, headaches and loss of balance. Negative for disturbances in coordination, focal weakness, light-headedness, numbness, seizures, vertigo and weakness.   Psychiatric/Behavioral: Positive for depression and memory loss. Negative for altered mental status. The patient is nervous/anxious.    Allergic/Immunologic: Negative for hives and persistent infections.          Objective:   /82 (BP Location: Right arm, Patient Position: Sitting)   Pulse 62   Ht 152.4 cm (60\")   Wt 77.8 kg (171 lb 8 oz)   SpO2 95%   BMI 33.49 kg/m²         Vitals reviewed.   Constitutional:       Appearance: Healthy appearance. Well-developed and not in distress.   Eyes:      Conjunctiva/sclera: Conjunctivae normal.      Pupils: Pupils are equal, round, and reactive to light.   HENT:      Head: Normocephalic and atraumatic.    Mouth/Throat:      Pharynx: Oropharynx is clear.   Neck:      Thyroid: Thyroid normal. No thyromegaly.      Vascular: Normal carotid pulses. No carotid bruit or JVD. JVD normal.      Lymphadenopathy: No cervical adenopathy.   Pulmonary:      Effort: No respiratory distress.      Breath sounds: No wheezing. No rales.   Chest:      Chest wall: Not tender to palpatation.   Cardiovascular:      Normal rate. Regular rhythm.      No gallop.   Pulses:     Carotid: 2+ bilaterally.     Dorsalis pedis: 2+ bilaterally.     Posterior tibial: 2+ bilaterally.  Abdominal:      General: There is no distension or abdominal bruit.      Palpations: There is no abdominal mass.      Tenderness: There is no abdominal tenderness. There " "is no rebound.   Musculoskeletal:         General: No tenderness or deformity.      Extremities: No clubbing present.Skin:     General: Skin is warm and dry. There is no cyanosis.      Findings: No rash.   Neurological:      Mental Status: Alert, oriented to person, place, and time and oriented to person, place and time.           ECG 12 Lead    Date/Time: 3/22/2023 12:31 PM  Performed by: Jake Rachel MD  Authorized by: Jake Rachel MD   Comparison: compared with previous ECG from 8/20/2022  Similar to previous ECG  Rhythm: sinus rhythm and paced  Conduction: non-specific intraventricular conduction delay  Other findings: non-specific ST-T wave changes    Clinical impression: abnormal EKG          Device check:  [employer] dual-chamber permanent pacemaker.  However RV lead currently off.  Normal functioning atrial lead.  Battery voltage 2.59 V.  SUMAYA at 2.60 V.  Underlying rhythm sinus.  No events.  No changes.  Atrially paced 88%.        Assessment:   Assessment & Plan      Diagnoses and all orders for this visit:    1. Coronary artery disease involving native coronary artery of native heart with other form of angina pectoris (HCC) (Primary)  -     ECG 12 Lead      1.  Coronary artery disease status post remote PCI's.  Dyspnea on exertion.  No exertional chest pain.  Negative perfusion study 2022  2.  Atypical resting chest and throat pain.  Possibly esophageal spasm versus atypical angina  3.  Sick sinus syndrome, with SUMAYA pacemaker.  4.  RV lead turned off due to \"symptoms 1 functioning\".  Unclear etiology.  Will need evaluated.  5.  Dyslipidemia currently off statin.  Was previously on atorvastatin.  6.  Hypertension controlled on lisinopril and metoprolol    Recommendations:  1.  Generator change or as soon as possible.  She has been SUMAYA now for over 3 months.  2.  Resume atorvastatin 40 mg nightly  3.  Continue other current medical therapy (resume aspirin 81 mg daily).  4.  Reevaluate patient's " symptoms of dyspnea and chest pain after her generator change.  5.  We will have RV lead interrogated at time of generator change to see if revision is necessary         Elvi SANTANA scribed portions of this dictation for Dr. Jake Rachel.     I have seen and examined the patient, I have reviewed the note, discussed the case with the advance practice clinician, made necessary changes and I agree with the final note.    Jake Rachel MD  03/22/23  12:48 EDT              Dictated utilizing Dragon dictation

## 2023-03-22 ENCOUNTER — OFFICE VISIT (OUTPATIENT)
Dept: CARDIOLOGY | Facility: CLINIC | Age: 73
End: 2023-03-22
Payer: MEDICARE

## 2023-03-22 VITALS
HEIGHT: 60 IN | OXYGEN SATURATION: 95 % | HEART RATE: 62 BPM | WEIGHT: 171.5 LBS | BODY MASS INDEX: 33.67 KG/M2 | DIASTOLIC BLOOD PRESSURE: 82 MMHG | SYSTOLIC BLOOD PRESSURE: 148 MMHG

## 2023-03-22 DIAGNOSIS — I49.5 SSS (SICK SINUS SYNDROME): ICD-10-CM

## 2023-03-22 DIAGNOSIS — R00.1 SYMPTOMATIC BRADYCARDIA: ICD-10-CM

## 2023-03-22 DIAGNOSIS — I25.118 CORONARY ARTERY DISEASE INVOLVING NATIVE CORONARY ARTERY OF NATIVE HEART WITH OTHER FORM OF ANGINA PECTORIS: Primary | ICD-10-CM

## 2023-03-22 DIAGNOSIS — Z45.010 PACEMAKER AT END OF BATTERY LIFE: ICD-10-CM

## 2023-03-22 PROCEDURE — 93000 ELECTROCARDIOGRAM COMPLETE: CPT | Performed by: INTERNAL MEDICINE

## 2023-03-22 PROCEDURE — 1160F RVW MEDS BY RX/DR IN RCRD: CPT | Performed by: INTERNAL MEDICINE

## 2023-03-22 PROCEDURE — 93279 PRGRMG DEV EVAL PM/LDLS PM: CPT | Performed by: INTERNAL MEDICINE

## 2023-03-22 PROCEDURE — 99204 OFFICE O/P NEW MOD 45 MIN: CPT | Performed by: INTERNAL MEDICINE

## 2023-03-22 PROCEDURE — 1159F MED LIST DOCD IN RCRD: CPT | Performed by: INTERNAL MEDICINE

## 2023-03-22 RX ORDER — OMEPRAZOLE 40 MG/1
40 CAPSULE, DELAYED RELEASE ORAL DAILY
COMMUNITY

## 2023-03-22 RX ORDER — LISINOPRIL 20 MG/1
20 TABLET ORAL DAILY
COMMUNITY

## 2023-03-23 PROBLEM — Z45.010 PACEMAKER AT END OF BATTERY LIFE: Status: ACTIVE | Noted: 2023-03-23

## 2023-03-23 PROBLEM — I49.5 SSS (SICK SINUS SYNDROME): Status: ACTIVE | Noted: 2023-03-23

## 2023-03-27 ENCOUNTER — TELEPHONE (OUTPATIENT)
Dept: CARDIOLOGY | Facility: CLINIC | Age: 73
End: 2023-03-27
Payer: MEDICARE

## 2023-03-27 NOTE — TELEPHONE ENCOUNTER
Spoke with patient, she advises she went to [facility] for serious chest pain via EMS last night.  She reports they wanted to keep her, but stated they needed to transfer her to [facility].  She refused to be transferred there due to concern being further away from her cardiologist.  She states she is not having chest pain this morning.  Advised will call for records, in the interim if chest pain returns, to seek eval at ER.  Understanding verbalized.

## 2023-03-29 ENCOUNTER — PREP FOR SURGERY (OUTPATIENT)
Dept: OTHER | Facility: HOSPITAL | Age: 73
End: 2023-03-29
Payer: MEDICARE

## 2023-03-29 DIAGNOSIS — Z45.010 PACEMAKER AT END OF BATTERY LIFE: Primary | ICD-10-CM

## 2023-03-29 RX ORDER — ACETAMINOPHEN 325 MG/1
650 TABLET ORAL EVERY 4 HOURS PRN
Status: CANCELLED | OUTPATIENT
Start: 2023-03-29

## 2023-03-29 RX ORDER — ONDANSETRON 2 MG/ML
4 INJECTION INTRAMUSCULAR; INTRAVENOUS EVERY 6 HOURS PRN
Status: CANCELLED | OUTPATIENT
Start: 2023-03-29

## 2023-03-29 RX ORDER — SODIUM CHLORIDE 9 MG/ML
40 INJECTION, SOLUTION INTRAVENOUS AS NEEDED
Status: CANCELLED | OUTPATIENT
Start: 2023-03-29

## 2023-03-29 RX ORDER — NITROGLYCERIN 0.4 MG/1
0.4 TABLET SUBLINGUAL
Status: CANCELLED | OUTPATIENT
Start: 2023-03-29

## 2023-03-29 RX ORDER — SODIUM CHLORIDE 0.9 % (FLUSH) 0.9 %
3 SYRINGE (ML) INJECTION EVERY 12 HOURS SCHEDULED
Status: CANCELLED | OUTPATIENT
Start: 2023-03-29

## 2023-03-29 RX ORDER — SODIUM CHLORIDE 0.9 % (FLUSH) 0.9 %
10 SYRINGE (ML) INJECTION AS NEEDED
Status: CANCELLED | OUTPATIENT
Start: 2023-03-29

## 2023-03-29 RX ORDER — CEFAZOLIN SODIUM 2 G/100ML
2 INJECTION, SOLUTION INTRAVENOUS ONCE
Status: CANCELLED | OUTPATIENT
Start: 2023-03-29 | End: 2023-03-29

## 2023-03-30 ENCOUNTER — HOSPITAL ENCOUNTER (OUTPATIENT)
Facility: HOSPITAL | Age: 73
Discharge: HOME | End: 2023-03-30
Attending: INTERNAL MEDICINE | Admitting: INTERNAL MEDICINE
Payer: MEDICARE

## 2023-03-30 VITALS
OXYGEN SATURATION: 93 % | SYSTOLIC BLOOD PRESSURE: 116 MMHG | RESPIRATION RATE: 13 BRPM | DIASTOLIC BLOOD PRESSURE: 61 MMHG | HEIGHT: 60 IN | TEMPERATURE: 97.2 F | WEIGHT: 168.4 LBS | HEART RATE: 71 BPM | BODY MASS INDEX: 33.06 KG/M2

## 2023-03-30 DIAGNOSIS — Z45.010 PACEMAKER AT END OF BATTERY LIFE: ICD-10-CM

## 2023-03-30 DIAGNOSIS — I49.5 SSS (SICK SINUS SYNDROME): ICD-10-CM

## 2023-03-30 DIAGNOSIS — R00.1 SYMPTOMATIC BRADYCARDIA: ICD-10-CM

## 2023-03-30 DIAGNOSIS — I25.118 CORONARY ARTERY DISEASE INVOLVING NATIVE CORONARY ARTERY OF NATIVE HEART WITH OTHER FORM OF ANGINA PECTORIS: ICD-10-CM

## 2023-03-30 LAB
ANION GAP SERPL CALCULATED.3IONS-SCNC: 10 MMOL/L (ref 5–15)
BUN SERPL-MCNC: 19 MG/DL (ref 8–23)
BUN/CREAT SERPL: 22.6 (ref 7–25)
CALCIUM SPEC-SCNC: 9.9 MG/DL (ref 8.6–10.5)
CHLORIDE SERPL-SCNC: 106 MMOL/L (ref 98–107)
CO2 SERPL-SCNC: 26 MMOL/L (ref 22–29)
CREAT SERPL-MCNC: 0.84 MG/DL (ref 0.57–1)
DEPRECATED RDW RBC AUTO: 41.9 FL (ref 37–54)
EGFRCR SERPLBLD CKD-EPI 2021: 73.5 ML/MIN/1.73
ERYTHROCYTE [DISTWIDTH] IN BLOOD BY AUTOMATED COUNT: 12.6 % (ref 12.3–15.4)
GLUCOSE SERPL-MCNC: 102 MG/DL (ref 65–99)
HCT VFR BLD AUTO: 39.1 % (ref 34–46.6)
HGB BLD-MCNC: 12.7 G/DL (ref 12–15.9)
MAGNESIUM SERPL-MCNC: 2.1 MG/DL (ref 1.6–2.4)
MCH RBC QN AUTO: 29.1 PG (ref 26.6–33)
MCHC RBC AUTO-ENTMCNC: 32.5 G/DL (ref 31.5–35.7)
MCV RBC AUTO: 89.7 FL (ref 79–97)
PLATELET # BLD AUTO: 264 10*3/MM3 (ref 140–450)
PMV BLD AUTO: 9.8 FL (ref 6–12)
POTASSIUM SERPL-SCNC: 4 MMOL/L (ref 3.5–5.2)
RBC # BLD AUTO: 4.36 10*6/MM3 (ref 3.77–5.28)
SODIUM SERPL-SCNC: 142 MMOL/L (ref 136–145)
WBC NRBC COR # BLD: 5.69 10*3/MM3 (ref 3.4–10.8)

## 2023-03-30 PROCEDURE — 85027 COMPLETE CBC AUTOMATED: CPT | Performed by: PHYSICIAN ASSISTANT

## 2023-03-30 PROCEDURE — 25010000002 CEFAZOLIN IN DEXTROSE 2-4 GM/100ML-% SOLUTION: Performed by: PHYSICIAN ASSISTANT

## 2023-03-30 PROCEDURE — C1785 PMKR, DUAL, RATE-RESP: HCPCS | Performed by: INTERNAL MEDICINE

## 2023-03-30 PROCEDURE — 33228 REMV&REPLC PM GEN DUAL LEAD: CPT | Performed by: INTERNAL MEDICINE

## 2023-03-30 PROCEDURE — 0 LIDOCAINE 1 % SOLUTION: Performed by: INTERNAL MEDICINE

## 2023-03-30 PROCEDURE — S0260 H&P FOR SURGERY: HCPCS | Performed by: INTERNAL MEDICINE

## 2023-03-30 PROCEDURE — 25010000002 MIDAZOLAM PER 1 MG: Performed by: INTERNAL MEDICINE

## 2023-03-30 PROCEDURE — 80048 BASIC METABOLIC PNL TOTAL CA: CPT | Performed by: PHYSICIAN ASSISTANT

## 2023-03-30 PROCEDURE — 83735 ASSAY OF MAGNESIUM: CPT | Performed by: PHYSICIAN ASSISTANT

## 2023-03-30 PROCEDURE — 99152 MOD SED SAME PHYS/QHP 5/>YRS: CPT | Performed by: INTERNAL MEDICINE

## 2023-03-30 PROCEDURE — 25010000002 FENTANYL CITRATE (PF) 50 MCG/ML SOLUTION: Performed by: INTERNAL MEDICINE

## 2023-03-30 DEVICE — GEN PM ASSURITY MRI DR RF PM2272: Type: IMPLANTABLE DEVICE | Status: FUNCTIONAL

## 2023-03-30 RX ORDER — SODIUM CHLORIDE 9 MG/ML
40 INJECTION, SOLUTION INTRAVENOUS AS NEEDED
Status: DISCONTINUED | OUTPATIENT
Start: 2023-03-30 | End: 2023-03-30 | Stop reason: HOSPADM

## 2023-03-30 RX ORDER — ACETAMINOPHEN 325 MG/1
650 TABLET ORAL EVERY 6 HOURS SCHEDULED
Qty: 40 TABLET | Refills: 0 | Status: SHIPPED | OUTPATIENT
Start: 2023-03-30 | End: 2023-04-04

## 2023-03-30 RX ORDER — MIDAZOLAM HYDROCHLORIDE 1 MG/ML
INJECTION INTRAMUSCULAR; INTRAVENOUS
Status: DISCONTINUED | OUTPATIENT
Start: 2023-03-30 | End: 2023-03-30 | Stop reason: HOSPADM

## 2023-03-30 RX ORDER — NITROGLYCERIN 0.4 MG/1
0.4 TABLET SUBLINGUAL
Status: DISCONTINUED | OUTPATIENT
Start: 2023-03-30 | End: 2023-03-30 | Stop reason: HOSPADM

## 2023-03-30 RX ORDER — FENTANYL CITRATE 50 UG/ML
INJECTION, SOLUTION INTRAMUSCULAR; INTRAVENOUS
Status: DISCONTINUED | OUTPATIENT
Start: 2023-03-30 | End: 2023-03-30 | Stop reason: HOSPADM

## 2023-03-30 RX ORDER — LIDOCAINE HYDROCHLORIDE 10 MG/ML
INJECTION, SOLUTION INFILTRATION; PERINEURAL
Status: DISCONTINUED | OUTPATIENT
Start: 2023-03-30 | End: 2023-03-30 | Stop reason: HOSPADM

## 2023-03-30 RX ORDER — BUPIVACAINE HYDROCHLORIDE 5 MG/ML
INJECTION, SOLUTION PERINEURAL
Status: DISCONTINUED | OUTPATIENT
Start: 2023-03-30 | End: 2023-03-30 | Stop reason: HOSPADM

## 2023-03-30 RX ORDER — IBUPROFEN 600 MG/1
600 TABLET ORAL EVERY 6 HOURS SCHEDULED
Status: DISCONTINUED | OUTPATIENT
Start: 2023-03-30 | End: 2023-03-30 | Stop reason: HOSPADM

## 2023-03-30 RX ORDER — ONDANSETRON 2 MG/ML
4 INJECTION INTRAMUSCULAR; INTRAVENOUS EVERY 6 HOURS PRN
Status: DISCONTINUED | OUTPATIENT
Start: 2023-03-30 | End: 2023-03-30 | Stop reason: HOSPADM

## 2023-03-30 RX ORDER — CEFAZOLIN SODIUM 2 G/100ML
2 INJECTION, SOLUTION INTRAVENOUS ONCE
Status: COMPLETED | OUTPATIENT
Start: 2023-03-30 | End: 2023-03-30

## 2023-03-30 RX ORDER — SODIUM CHLORIDE 0.9 % (FLUSH) 0.9 %
3 SYRINGE (ML) INJECTION EVERY 12 HOURS SCHEDULED
Status: DISCONTINUED | OUTPATIENT
Start: 2023-03-30 | End: 2023-03-30 | Stop reason: HOSPADM

## 2023-03-30 RX ORDER — ACETAMINOPHEN 325 MG/1
650 TABLET ORAL EVERY 4 HOURS PRN
Status: DISCONTINUED | OUTPATIENT
Start: 2023-03-30 | End: 2023-03-30 | Stop reason: HOSPADM

## 2023-03-30 RX ORDER — SODIUM CHLORIDE 0.9 % (FLUSH) 0.9 %
10 SYRINGE (ML) INJECTION AS NEEDED
Status: DISCONTINUED | OUTPATIENT
Start: 2023-03-30 | End: 2023-03-30 | Stop reason: HOSPADM

## 2023-03-30 RX ORDER — ACETAMINOPHEN 325 MG/1
650 TABLET ORAL EVERY 6 HOURS
Status: DISCONTINUED | OUTPATIENT
Start: 2023-03-30 | End: 2023-03-30 | Stop reason: HOSPADM

## 2023-03-30 RX ORDER — IBUPROFEN 600 MG/1
600 TABLET ORAL EVERY 6 HOURS SCHEDULED
Qty: 20 TABLET | Refills: 0 | Status: SHIPPED | OUTPATIENT
Start: 2023-03-30 | End: 2023-04-04

## 2023-03-30 RX ADMIN — CEFAZOLIN SODIUM 2 G: 2 INJECTION, SOLUTION INTRAVENOUS at 15:56

## 2023-03-30 NOTE — INTERVAL H&P NOTE
H&P reviewed. The patient was examined and there are changes to the H&P as follows.  She reports she was in the emergency department in [place] 1 week ago complaining of chest pain.  Records are not available for review but she discharged to home that night on her current medical regimen.  This pain has not reoccurred.       EP Pre-Procedure Report  Cardiovascular Laboratory  [facility]    Patient:  Ludy Stock  :  1950  PCP:  Provider, No Known  PHONE:  [phone]    DATE: 3/30/2023      MEDICATIONS:  Prior to Admission medications    Medication Sig Start Date End Date Taking? Authorizing Provider   aspirin 81 MG EC tablet Take  by mouth Daily. 14  Yes Leticia Moore MD   Coenzyme Q10 (CoQ-10) 100 MG capsule Take  by mouth Daily.   Yes Leticia Moore MD   diclofenac (VOLTAREN) 1 % gel gel Place  on the skin As Needed. 11/12/15  Yes Leticia Moore MD   furosemide (LASIX) 80 MG tablet Take 1 tablet by mouth Daily. 17  Yes Roger Moody IV, MD   levothyroxine (SYNTHROID, LEVOTHROID) 175 MCG tablet Take  by mouth Daily. 8/11/15  Yes Leticia Moore MD   linaclotide (LINZESS) 290 MCG capsule capsule Take 1 capsule by mouth Every Morning Before Breakfast.   Yes Leticia Moore MD   lisinopril (PRINIVIL,ZESTRIL) 20 MG tablet Take 1 tablet by mouth Daily.   Yes Leticia Moore MD   multivitamin with minerals tablet tablet Take 1 tablet by mouth Daily.   Yes Leticia Moore MD   oxyCODONE (OxyCONTIN) 10 MG 12 hr tablet Take 1 tablet by mouth 3 (Three) Times a Day. 14  Yes Leticia Moore MD   nitroglycerin (NITROSTAT) 0.4 MG SL tablet Place  under the tongue As Needed. 2/13/15   Leticia Moore MD   omeprazole (priLOSEC) 40 MG capsule Take 1 capsule by mouth Daily.    Leticia Moore MD   potassium chloride (K-DUR,KLOR-CON) 20 MEQ CR tablet Take 1 tablet by mouth As Needed.    Leticia Moore  MD   buPROPion XL (WELLBUTRIN XL) 300 MG 24 hr tablet Take 1 tablet by mouth Daily.  3/30/23  ProviderLeticia MD   metoprolol succinate XL (TOPROL-XL) 50 MG 24 hr tablet Take 1 tablet by mouth Daily. 10/25/16 3/30/23  Jean Paul Funes MD   oxyMORphone HCl ER 7.5 MG tablet extended-release 12 hour Take  by mouth 2 (Two) Times a Day. 3/10/15 3/30/23  Provider, MD Leticia       Past medical & surgical history, social and family history reviewed in the electronic medical record.    Physical Exam:    Vitals:   Vitals:    03/30/23 1414   BP: 163/86   Pulse: 72   Temp:    SpO2: 96%          03/30/23  1412   Weight: 76.4 kg (168 lb 6.4 oz)   Body mass index is 32.89 kg/m².    GENERAL: No apparent distress.  No significant changes since last exam.  CHEST: Clear to auscultation bilaterally no stridor no wheeze.  CV: S1, S2, regular without Murmurs, Rubs or Gallops  EXTREMITIES: No edema.            Results from last 7 days   Lab Units 03/30/23  1424   WBC 10*3/mm3 5.69   HEMOGLOBIN g/dL 12.7   HEMATOCRIT % 39.1   PLATELETS 10*3/mm3 264     CrCl cannot be calculated (Patient's most recent lab result is older than the maximum 30 days allowed.).    IMPRESSION:pacemaker battery depletion      PLAN:  Procedure to perform: Elective PM generator change.  Possible lead revision        Electronically signed by JAIRO Matt, 03/30/23, 2:59 PM EDT.

## 2023-03-30 NOTE — OP NOTE
Cardiac Electrophysiology Procedure Note      [facility]    PROCEDURE: PACEMAKER GENERATOR REPLACEMENT //     OPERATION PERFORMED:     1. Explantation of [employer] PM 2210 pulse generator with serial number [id number] implanted on January 4, 2010.  2. Testing of retained pacing leads:        A. Atrial lead [employer] model 1888 TC, 46 cm, CAC [id number] implanted on generator for 2010.        B. Right ventricular lead [employer] model 1888 TC, 52 cm, BCG [id number] implanted on January 4, 2010.    3. Implantation of [employer] model PM 2272 pulse generator with serial number [id number].    ATTENDING SURGEON: Paul Polo DO    MODERATE SEDATION FOR PROCEDURE:    Moderate sedation was given during this procedure.    I supervised and directed RN to administer this sedation.  This staff member also monitored the patient's hemodynamic and respiratory status and response to these medications.  Please see the full detailed procedure report generated by the electrophysiology laboratory staff.  The patient tolerated moderate sedation well.  There were no complications regarding sedation.  The total dose of fentanyl was 100 mcg and the total dose of midazolam was 3 mg.  The total dose of Brevital was 20 mg.  First sedation was administered at 1554 and continued through 1633.    ESTIMATED BLOOD LOSS: less than 20cc    COMPLICATIONS: None    TIME OUT: Time out was completed with verification of the correct patient identity, procedure to be performed, procedure site and implanted equipment.    INDICATION FOR PROCEDURE:  Briefly,Ludy Stock is a 72 y.o. female with a history of sinus node dysfunction who was initially implanted with a dual chamber pacemaker on January 4 of 2010.  The patient was recently seen in our device clinic at which time the patient's device was discovered to be at the elective replacement interval.  The patient was deemed appropriate for a generator replacement.    The  patient was able to give written informed consent after revisiting the key portions of the risk versus benefit profile of the procedure.  This discussion was framed by our lengthy conversations  (please see our detailed notes).  Patient verbalized strong understanding of this discussion and a strong desire to proceed with the procedure.  Please note that this detailed informed consent process utilized mutual and shared decision making process between all parties involved, principally the physician and patient, but also potentially with input from the patient's selected family and friends.    PROCEDURE AND FINDINGS:  The patient was brought to the electrophysiology suite in a post absorptive state.  Informed consent was obtained prior to the procedure and confirmed.  Intravenous prophylactic antibiotics were administered and confirmed to be completely infused prior to the start of the procedure.  After the site of implantation was prepped and draped in the usual sterile fashion and after adequate anesthesia was given, the skin was infiltrated with 1% lidocaine and 0.5% bupivicaine 50/50 mixture.  The skin was incised with a #10 scalpel.  Blunt and electrosurgical dissection was carried out to the pulse generator pocket.  The leads were carefully isolated with blunt and electrosurgical dissection.  Careful attention was paid not to damage the leads.  The pulse generator was explanted and the pocket was copiously irrigated with antibiotic containing normal saline and subsequently observed.  Once adequate hemostasis was confirmed within the pocket, the leads were detached from the pulse generator.  The leads were tested for adequate sensing, impedances and pacing thresholds.  The lead tips for all leads were cleaned and dried thoroughly.  The leads were attached to the appropriate ports on the new pulse generator.  Tug testing was performed on all connections.  The device and leads were placed within the pocket such  that the coiled redundant leads were posterior to the pulse generator.  Once again, the device was tested for adequate sensing, impedances and pacing thresholds.  The pulse generator was then sutured to the fascia in a medial location within the pocket using non absorbable suture.  The pocket was closed with two layers of suture using 2-0 then 3-0 Vicryl, followed by a layer of surgical adhesive and finally a sterile occlusive dressing.                     MEASURED DEVICE DATA:    Atrial lead                   sensin.2 mV                   impedance:           400 ohms                   Threshold:            0.75 volts at 0.5 ms    RV lead                   Known to be nonfunctional  /integrated into the new pulse generator                                                        PROGRAMMED PARAMETERS:    Mode:                                 AAIR  Lower Rate:                       70 pulses per minute  Upper Sensor Rate:          120 pulses per minute      CONCLUSION:  Successful pacemaker generator change.      Patient is to follow up with our clinic in approximately one week and then myself in 3 months.    No lovenox or heparin at any dose is to be given.    FOR THE PATIENT    Please do not lift more than 10 pounds or abduct the shoulder joint / or raise the arm above the shoulder for 6 weeks after device was implanted (this does not apply to subcutaneous ICDs).    Avoid activities that involve heavy lifting or rough contact that could result in blows to your implant site and to allow your incision time to heal.    No shower or getting device incision wet for 2 days post-operatively.    Keep wound exposed to air unless otherwise instructed, the surgical glue is the bandage.    No creams, lotions, or powders to incision.    Please avoid allowing bra strap or suspenders to lay over incision until completely healed.    Avoid hot tubs or pools until incision completely healed.    No driving for 24  hours post-operatively after device implant.    Call your doctor if you have any swelling, redness or discharge around your incision, notice anything unusual or unexpected, or you develop a fever that does not go away in two or three days.    Call your doctor if you hear any beeping sounds / vibratory alerts from your device as this indicates your device needs to be checked immediately.    Carry your Medical Device ID Card with you at all times.  Please call our office () with any questions about the device or the wounds.            Paul Polo DO, FACC, [facility]  Cardiac Electrophysiologist  [facility]

## 2023-03-30 NOTE — DISCHARGE INSTR - ACTIVITY
Do NOT restart Eliquis until Monday April 3rd 2023    Cardiology staff will contact you regarding your wound check and follow up. Please call [phone] if you do not hear from them by Monday April 3rd.

## 2023-03-30 NOTE — DISCHARGE INSTRUCTIONS
You will need to have a wound check appointment in about one week, and a three month follow up appointment with Dr. Polo. You will received a call from Dr. Polo's office with the date and times of your appointments. If you do no receive a call in a couple of days, given them a call.

## 2023-04-04 RX ORDER — ACETAMINOPHEN 325 MG/1
TABLET ORAL
Qty: 40 TABLET | Refills: 0 | OUTPATIENT
Start: 2023-04-04

## 2023-04-07 ENCOUNTER — OFFICE VISIT (OUTPATIENT)
Dept: CARDIOLOGY | Facility: CLINIC | Age: 73
End: 2023-04-07
Payer: MEDICARE

## 2023-04-07 DIAGNOSIS — I25.118 CORONARY ARTERY DISEASE INVOLVING NATIVE CORONARY ARTERY OF NATIVE HEART WITH OTHER FORM OF ANGINA PECTORIS: ICD-10-CM

## 2023-04-07 DIAGNOSIS — I49.5 SSS (SICK SINUS SYNDROME): ICD-10-CM

## 2023-04-07 DIAGNOSIS — R00.1 SYMPTOMATIC BRADYCARDIA: Primary | ICD-10-CM

## 2023-04-07 PROCEDURE — 99024 POSTOP FOLLOW-UP VISIT: CPT | Performed by: INTERNAL MEDICINE

## 2023-04-07 NOTE — PROGRESS NOTES
04/07/2023    Name: Ludy Stock  YOB: 1950    WOUND CHECK    Patient has fever: [] YES   [x] NO     Temperature if indicated:      Wound Location:  Right Shoulder     Surgical Glue: intact     Wound Appearance: clear/intact     Plan: normal wound check      Did patient receive home monitoring box? [x] YES   [] NO  (If no, contact device clinic.)    Does patient have questions about remote monitoring? [] YES   [x] NO (If yes notify device clinic)      Notes: Patient c/o of chest pressure that begins in the center of her sternum and radiates out. Consulted Carmen PEREZ, RN. Per Carmen, patient was instructed to report to the ED for symptoms. Patient verbalized understanding.        Appointment for follow-up scheduled for 3 months post procedure [x]    Future Appointments   Date Time Provider Department Center   7/3/2023  2:30 PM Paul Polo DO [facility]          Jewels Deluca MA, 04/07/23

## 2023-04-24 ENCOUNTER — TELEPHONE (OUTPATIENT)
Dept: CARDIOLOGY | Facility: CLINIC | Age: 73
End: 2023-04-24
Payer: MEDICARE

## 2023-04-24 NOTE — TELEPHONE ENCOUNTER
Patient called and states that over the weekend she had a pain she describes as bee stings at her pacemaker site and it went to the center of her chest. She also became very fatigued with this. She currently denies pain.  I transferred her to the device clinic to send through a remote reading.

## 2023-05-13 PROCEDURE — 93294 REM INTERROG EVL PM/LDLS PM: CPT | Performed by: INTERNAL MEDICINE

## 2023-05-13 PROCEDURE — 93296 REM INTERROG EVL PM/IDS: CPT | Performed by: INTERNAL MEDICINE

## 2023-07-24 ENCOUNTER — TELEPHONE (OUTPATIENT)
Dept: CARDIOLOGY | Facility: CLINIC | Age: 73
End: 2023-07-24
Payer: MEDICARE

## 2023-07-24 NOTE — TELEPHONE ENCOUNTER
Caller: Ludy Stock    Relationship to patient: Self    Best call back number: [phone]      Type of visit: F/U    Requested date: FIRST AVAILABLE     If rescheduling, when is the original appointment: 06-14-23     Additional notes:PATIENT WOULD LIKE TO SCHEDULE FIRST AVAILABLE APPT. AT EITHER [facility] OR [facility]. PLEASE CONTACT PATIENT WITH AN APPROPRIATE DATE.

## 2023-08-04 ENCOUNTER — TELEPHONE (OUTPATIENT)
Dept: CARDIOLOGY | Facility: CLINIC | Age: 73
End: 2023-08-04
Payer: MEDICARE

## 2023-08-12 PROCEDURE — 93296 REM INTERROG EVL PM/IDS: CPT | Performed by: INTERNAL MEDICINE

## 2023-08-12 PROCEDURE — 93294 REM INTERROG EVL PM/LDLS PM: CPT | Performed by: INTERNAL MEDICINE

## 2023-12-05 ENCOUNTER — HOSPITAL ENCOUNTER (INPATIENT)
Facility: HOSPITAL | Age: 73
LOS: 2 days | Discharge: HOME | DRG: 321 | End: 2023-12-08
Attending: EMERGENCY MEDICINE | Admitting: STUDENT IN AN ORGANIZED HEALTH CARE EDUCATION/TRAINING PROGRAM
Payer: MEDICARE

## 2023-12-05 DIAGNOSIS — I21.4 NSTEMI (NON-ST ELEVATED MYOCARDIAL INFARCTION): ICD-10-CM

## 2023-12-05 DIAGNOSIS — I25.110 CORONARY ARTERY DISEASE INVOLVING NATIVE HEART WITH UNSTABLE ANGINA PECTORIS, UNSPECIFIED VESSEL OR LESION TYPE: ICD-10-CM

## 2023-12-05 DIAGNOSIS — R07.9 CHEST PAIN, UNSPECIFIED TYPE: ICD-10-CM

## 2023-12-05 DIAGNOSIS — I21.4 NSTEMI, INITIAL EPISODE OF CARE: Primary | ICD-10-CM

## 2023-12-05 LAB
QT INTERVAL: 398 MS
QTC INTERVAL: 429 MS

## 2023-12-05 PROCEDURE — 83690 ASSAY OF LIPASE: CPT | Performed by: EMERGENCY MEDICINE

## 2023-12-05 PROCEDURE — 93005 ELECTROCARDIOGRAM TRACING: CPT | Performed by: EMERGENCY MEDICINE

## 2023-12-05 PROCEDURE — 80053 COMPREHEN METABOLIC PANEL: CPT | Performed by: EMERGENCY MEDICINE

## 2023-12-05 PROCEDURE — 85379 FIBRIN DEGRADATION QUANT: CPT | Performed by: NURSE PRACTITIONER

## 2023-12-05 PROCEDURE — 85610 PROTHROMBIN TIME: CPT | Performed by: NURSE PRACTITIONER

## 2023-12-05 PROCEDURE — 36415 COLL VENOUS BLD VENIPUNCTURE: CPT

## 2023-12-05 PROCEDURE — 85025 COMPLETE CBC W/AUTO DIFF WBC: CPT | Performed by: EMERGENCY MEDICINE

## 2023-12-05 PROCEDURE — 99285 EMERGENCY DEPT VISIT HI MDM: CPT

## 2023-12-05 PROCEDURE — 83880 ASSAY OF NATRIURETIC PEPTIDE: CPT | Performed by: EMERGENCY MEDICINE

## 2023-12-05 RX ORDER — SODIUM CHLORIDE 0.9 % (FLUSH) 0.9 %
10 SYRINGE (ML) INJECTION AS NEEDED
Status: DISCONTINUED | OUTPATIENT
Start: 2023-12-05 | End: 2023-12-08 | Stop reason: HOSPADM

## 2023-12-05 RX ORDER — ASPIRIN 81 MG/1
324 TABLET, CHEWABLE ORAL ONCE
Status: DISCONTINUED | OUTPATIENT
Start: 2023-12-05 | End: 2023-12-08 | Stop reason: HOSPADM

## 2023-12-05 NOTE — Clinical Note
First balloon inflation max pressure = 15 isaiah. First balloon inflation duration = 10 seconds. Second inflation of balloon - Max pressure = 18 isaiah. 2nd Inflation of balloon - Duration = 10 seconds.

## 2023-12-05 NOTE — Clinical Note
First balloon inflation max pressure = 15 isaiah. First balloon inflation duration = 10 seconds. Second inflation of balloon - Max pressure = 15 isaiah. 2nd Inflation of balloon - Duration = 10 seconds.

## 2023-12-05 NOTE — Clinical Note
First balloon inflation max pressure = 12 isaiah. First balloon inflation duration = 15 seconds. Second inflation of balloon - Max pressure = 12 isaiah. 2nd Inflation of balloon - Duration = 15 seconds.

## 2023-12-06 ENCOUNTER — APPOINTMENT (OUTPATIENT)
Dept: GENERAL RADIOLOGY | Facility: HOSPITAL | Age: 73
DRG: 321 | End: 2023-12-06
Payer: MEDICARE

## 2023-12-06 PROBLEM — I50.32 DIASTOLIC CHF, CHRONIC: Status: ACTIVE | Noted: 2023-12-06

## 2023-12-06 PROBLEM — I21.4 NSTEMI, INITIAL EPISODE OF CARE: Status: ACTIVE | Noted: 2023-12-05

## 2023-12-06 PROBLEM — R07.9 CHEST PAIN: Status: ACTIVE | Noted: 2023-12-06

## 2023-12-06 PROBLEM — I21.4 NSTEMI (NON-ST ELEVATED MYOCARDIAL INFARCTION): Status: ACTIVE | Noted: 2023-12-06

## 2023-12-06 PROBLEM — I48.0 PAF (PAROXYSMAL ATRIAL FIBRILLATION): Status: ACTIVE | Noted: 2023-12-06

## 2023-12-06 LAB
ALBUMIN SERPL-MCNC: 3.7 G/DL (ref 3.5–5.2)
ALBUMIN/GLOB SERPL: 2.1 G/DL
ALP SERPL-CCNC: 79 U/L (ref 39–117)
ALT SERPL W P-5'-P-CCNC: <5 U/L (ref 1–33)
ANION GAP SERPL CALCULATED.3IONS-SCNC: 11 MMOL/L (ref 5–15)
ANION GAP SERPL CALCULATED.3IONS-SCNC: 9 MMOL/L (ref 5–15)
APTT PPP: 29.3 SECONDS (ref 60–90)
APTT PPP: 57.6 SECONDS (ref 60–90)
AST SERPL-CCNC: 17 U/L (ref 1–32)
BASOPHILS # BLD AUTO: 0.06 10*3/MM3 (ref 0–0.2)
BASOPHILS # BLD AUTO: 0.06 10*3/MM3 (ref 0–0.2)
BASOPHILS NFR BLD AUTO: 1.3 % (ref 0–1.5)
BASOPHILS NFR BLD AUTO: 1.3 % (ref 0–1.5)
BILIRUB SERPL-MCNC: 0.2 MG/DL (ref 0–1.2)
BUN SERPL-MCNC: 17 MG/DL (ref 8–23)
BUN SERPL-MCNC: 18 MG/DL (ref 8–23)
BUN/CREAT SERPL: 19.1 (ref 7–25)
BUN/CREAT SERPL: 22.5 (ref 7–25)
CALCIUM SPEC-SCNC: 8.9 MG/DL (ref 8.6–10.5)
CALCIUM SPEC-SCNC: 9.3 MG/DL (ref 8.6–10.5)
CHLORIDE SERPL-SCNC: 105 MMOL/L (ref 98–107)
CHLORIDE SERPL-SCNC: 107 MMOL/L (ref 98–107)
CHOLEST SERPL-MCNC: 380 MG/DL (ref 0–200)
CO2 SERPL-SCNC: 25 MMOL/L (ref 22–29)
CO2 SERPL-SCNC: 26 MMOL/L (ref 22–29)
CREAT SERPL-MCNC: 0.8 MG/DL (ref 0.57–1)
CREAT SERPL-MCNC: 0.89 MG/DL (ref 0.57–1)
D DIMER PPP FEU-MCNC: 0.5 MCGFEU/ML (ref 0–0.74)
DEPRECATED RDW RBC AUTO: 44 FL (ref 37–54)
DEPRECATED RDW RBC AUTO: 45.8 FL (ref 37–54)
EGFRCR SERPLBLD CKD-EPI 2021: 68.1 ML/MIN/1.73
EGFRCR SERPLBLD CKD-EPI 2021: 77.4 ML/MIN/1.73
EOSINOPHIL # BLD AUTO: 0.21 10*3/MM3 (ref 0–0.4)
EOSINOPHIL # BLD AUTO: 0.25 10*3/MM3 (ref 0–0.4)
EOSINOPHIL NFR BLD AUTO: 4.6 % (ref 0.3–6.2)
EOSINOPHIL NFR BLD AUTO: 5.3 % (ref 0.3–6.2)
ERYTHROCYTE [DISTWIDTH] IN BLOOD BY AUTOMATED COUNT: 13.2 % (ref 12.3–15.4)
ERYTHROCYTE [DISTWIDTH] IN BLOOD BY AUTOMATED COUNT: 13.3 % (ref 12.3–15.4)
GEN 5 2HR TROPONIN T REFLEX: 40 NG/L
GLOBULIN UR ELPH-MCNC: 1.8 GM/DL
GLUCOSE SERPL-MCNC: 114 MG/DL (ref 65–99)
GLUCOSE SERPL-MCNC: 115 MG/DL (ref 65–99)
HBA1C MFR BLD: 5.4 % (ref 4.8–5.6)
HCT VFR BLD AUTO: 35.6 % (ref 34–46.6)
HCT VFR BLD AUTO: 36.5 % (ref 34–46.6)
HDLC SERPL-MCNC: 39 MG/DL (ref 40–60)
HGB BLD-MCNC: 11.6 G/DL (ref 12–15.9)
HGB BLD-MCNC: 12 G/DL (ref 12–15.9)
HOLD SPECIMEN: NORMAL
IMM GRANULOCYTES # BLD AUTO: 0.02 10*3/MM3 (ref 0–0.05)
IMM GRANULOCYTES # BLD AUTO: 0.02 10*3/MM3 (ref 0–0.05)
IMM GRANULOCYTES NFR BLD AUTO: 0.4 % (ref 0–0.5)
IMM GRANULOCYTES NFR BLD AUTO: 0.4 % (ref 0–0.5)
INR PPP: 0.91 (ref 0.89–1.12)
LDLC SERPL CALC-MCNC: 174 MG/DL (ref 0–100)
LDLC/HDLC SERPL: 4.75 {RATIO}
LIPASE SERPL-CCNC: 16 U/L (ref 13–60)
LYMPHOCYTES # BLD AUTO: 1.76 10*3/MM3 (ref 0.7–3.1)
LYMPHOCYTES # BLD AUTO: 1.87 10*3/MM3 (ref 0.7–3.1)
LYMPHOCYTES NFR BLD AUTO: 37.4 % (ref 19.6–45.3)
LYMPHOCYTES NFR BLD AUTO: 41 % (ref 19.6–45.3)
MAGNESIUM SERPL-MCNC: 2.1 MG/DL (ref 1.6–2.4)
MCH RBC QN AUTO: 29.7 PG (ref 26.6–33)
MCH RBC QN AUTO: 30.8 PG (ref 26.6–33)
MCHC RBC AUTO-ENTMCNC: 32.6 G/DL (ref 31.5–35.7)
MCHC RBC AUTO-ENTMCNC: 32.9 G/DL (ref 31.5–35.7)
MCV RBC AUTO: 91.3 FL (ref 79–97)
MCV RBC AUTO: 93.8 FL (ref 79–97)
MONOCYTES # BLD AUTO: 0.42 10*3/MM3 (ref 0.1–0.9)
MONOCYTES # BLD AUTO: 0.46 10*3/MM3 (ref 0.1–0.9)
MONOCYTES NFR BLD AUTO: 9.2 % (ref 5–12)
MONOCYTES NFR BLD AUTO: 9.8 % (ref 5–12)
NEUTROPHILS NFR BLD AUTO: 1.98 10*3/MM3 (ref 1.7–7)
NEUTROPHILS NFR BLD AUTO: 2.15 10*3/MM3 (ref 1.7–7)
NEUTROPHILS NFR BLD AUTO: 43.5 % (ref 42.7–76)
NEUTROPHILS NFR BLD AUTO: 45.8 % (ref 42.7–76)
NRBC BLD AUTO-RTO: 0 /100 WBC (ref 0–0.2)
NRBC BLD AUTO-RTO: 0 /100 WBC (ref 0–0.2)
NT-PROBNP SERPL-MCNC: 503.5 PG/ML (ref 0–900)
PLATELET # BLD AUTO: 224 10*3/MM3 (ref 140–450)
PLATELET # BLD AUTO: 286 10*3/MM3 (ref 140–450)
PMV BLD AUTO: 10.5 FL (ref 6–12)
PMV BLD AUTO: 9.4 FL (ref 6–12)
POTASSIUM SERPL-SCNC: 3.9 MMOL/L (ref 3.5–5.2)
POTASSIUM SERPL-SCNC: 3.9 MMOL/L (ref 3.5–5.2)
PROT SERPL-MCNC: 5.5 G/DL (ref 6–8.5)
PROTHROMBIN TIME: 12.3 SECONDS (ref 12.2–14.5)
RBC # BLD AUTO: 3.89 10*6/MM3 (ref 3.77–5.28)
RBC # BLD AUTO: 3.9 10*6/MM3 (ref 3.77–5.28)
SODIUM SERPL-SCNC: 141 MMOL/L (ref 136–145)
SODIUM SERPL-SCNC: 142 MMOL/L (ref 136–145)
TRIGL SERPL-MCNC: 778 MG/DL (ref 0–150)
TROPONIN T DELTA: 16 NG/L
TROPONIN T SERPL HS-MCNC: 24 NG/L
UFH PPP CHRO-ACNC: 0.25 IU/ML (ref 0.3–0.7)
VLDLC SERPL-MCNC: 167 MG/DL (ref 5–40)
WBC NRBC COR # BLD AUTO: 4.56 10*3/MM3 (ref 3.4–10.8)
WBC NRBC COR # BLD AUTO: 4.7 10*3/MM3 (ref 3.4–10.8)
WHOLE BLOOD HOLD COAG: NORMAL
WHOLE BLOOD HOLD SPECIMEN: NORMAL

## 2023-12-06 PROCEDURE — 85025 COMPLETE CBC W/AUTO DIFF WBC: CPT | Performed by: NURSE PRACTITIONER

## 2023-12-06 PROCEDURE — 84484 ASSAY OF TROPONIN QUANT: CPT | Performed by: EMERGENCY MEDICINE

## 2023-12-06 PROCEDURE — 99222 1ST HOSP IP/OBS MODERATE 55: CPT | Performed by: NURSE PRACTITIONER

## 2023-12-06 PROCEDURE — C1769 GUIDE WIRE: HCPCS | Performed by: INTERNAL MEDICINE

## 2023-12-06 PROCEDURE — 93005 ELECTROCARDIOGRAM TRACING: CPT | Performed by: NURSE PRACTITIONER

## 2023-12-06 PROCEDURE — B221Z2Z COMPUTERIZED TOMOGRAPHY (CT SCAN) OF MULTIPLE CORONARY ARTERIES USING INTRAVASCULAR OPTICAL COHERENCE: ICD-10-PCS | Performed by: INTERNAL MEDICINE

## 2023-12-06 PROCEDURE — 93458 L HRT ARTERY/VENTRICLE ANGIO: CPT | Performed by: INTERNAL MEDICINE

## 2023-12-06 PROCEDURE — 25010000002 MIDAZOLAM PER 1 MG: Performed by: INTERNAL MEDICINE

## 2023-12-06 PROCEDURE — 83735 ASSAY OF MAGNESIUM: CPT | Performed by: NURSE PRACTITIONER

## 2023-12-06 PROCEDURE — 25010000002 ONDANSETRON PER 1 MG: Performed by: INTERNAL MEDICINE

## 2023-12-06 PROCEDURE — 85730 THROMBOPLASTIN TIME PARTIAL: CPT | Performed by: PEDIATRICS

## 2023-12-06 PROCEDURE — 25810000003 SODIUM CHLORIDE 0.9 % SOLUTION: Performed by: INTERNAL MEDICINE

## 2023-12-06 PROCEDURE — C1887 CATHETER, GUIDING: HCPCS | Performed by: INTERNAL MEDICINE

## 2023-12-06 PROCEDURE — 71045 X-RAY EXAM CHEST 1 VIEW: CPT

## 2023-12-06 PROCEDURE — 83036 HEMOGLOBIN GLYCOSYLATED A1C: CPT | Performed by: NURSE PRACTITIONER

## 2023-12-06 PROCEDURE — 25810000003 SODIUM CHLORIDE 0.9 % SOLUTION: Performed by: NURSE PRACTITIONER

## 2023-12-06 PROCEDURE — C1874 STENT, COATED/COV W/DEL SYS: HCPCS | Performed by: INTERNAL MEDICINE

## 2023-12-06 PROCEDURE — 25010000002 HEPARIN (PORCINE) 25000-0.45 UT/250ML-% SOLUTION: Performed by: PEDIATRICS

## 2023-12-06 PROCEDURE — 4A023N7 MEASUREMENT OF CARDIAC SAMPLING AND PRESSURE, LEFT HEART, PERCUTANEOUS APPROACH: ICD-10-PCS | Performed by: INTERNAL MEDICINE

## 2023-12-06 PROCEDURE — 80061 LIPID PANEL: CPT | Performed by: NURSE PRACTITIONER

## 2023-12-06 PROCEDURE — 25510000001 IOPAMIDOL PER 1 ML: Performed by: INTERNAL MEDICINE

## 2023-12-06 PROCEDURE — C1894 INTRO/SHEATH, NON-LASER: HCPCS | Performed by: INTERNAL MEDICINE

## 2023-12-06 PROCEDURE — C1725 CATH, TRANSLUMIN NON-LASER: HCPCS | Performed by: INTERNAL MEDICINE

## 2023-12-06 PROCEDURE — C1753 CATH, INTRAVAS ULTRASOUND: HCPCS | Performed by: INTERNAL MEDICINE

## 2023-12-06 PROCEDURE — 85520 HEPARIN ASSAY: CPT | Performed by: PEDIATRICS

## 2023-12-06 PROCEDURE — 92928 PRQ TCAT PLMT NTRAC ST 1 LES: CPT | Performed by: INTERNAL MEDICINE

## 2023-12-06 PROCEDURE — 99222 1ST HOSP IP/OBS MODERATE 55: CPT | Performed by: INTERNAL MEDICINE

## 2023-12-06 PROCEDURE — 80048 BASIC METABOLIC PNL TOTAL CA: CPT | Performed by: NURSE PRACTITIONER

## 2023-12-06 PROCEDURE — 93005 ELECTROCARDIOGRAM TRACING: CPT | Performed by: EMERGENCY MEDICINE

## 2023-12-06 PROCEDURE — 027034Z DILATION OF CORONARY ARTERY, ONE ARTERY WITH DRUG-ELUTING INTRALUMINAL DEVICE, PERCUTANEOUS APPROACH: ICD-10-PCS | Performed by: INTERNAL MEDICINE

## 2023-12-06 PROCEDURE — 92978 ENDOLUMINL IVUS OCT C 1ST: CPT | Performed by: INTERNAL MEDICINE

## 2023-12-06 PROCEDURE — 25010000002 HEPARIN (PORCINE) PER 1000 UNITS: Performed by: INTERNAL MEDICINE

## 2023-12-06 PROCEDURE — 85730 THROMBOPLASTIN TIME PARTIAL: CPT

## 2023-12-06 PROCEDURE — 25010000002 FENTANYL CITRATE (PF) 50 MCG/ML SOLUTION: Performed by: INTERNAL MEDICINE

## 2023-12-06 PROCEDURE — C9600 PERC DRUG-EL COR STENT SING: HCPCS | Performed by: INTERNAL MEDICINE

## 2023-12-06 PROCEDURE — B2111ZZ FLUOROSCOPY OF MULTIPLE CORONARY ARTERIES USING LOW OSMOLAR CONTRAST: ICD-10-PCS | Performed by: INTERNAL MEDICINE

## 2023-12-06 DEVICE — XIENCE SKYPOINT™ EVEROLIMUS ELUTING CORONARY STENT SYSTEM 3.00 MM X 28 MM / RAPID-EXCHANGE
Type: IMPLANTABLE DEVICE | Site: CORONARY | Status: FUNCTIONAL
Brand: XIENCE SKYPOINT™

## 2023-12-06 RX ORDER — ROSUVASTATIN CALCIUM 20 MG/1
20 TABLET, COATED ORAL NIGHTLY
Status: DISCONTINUED | OUTPATIENT
Start: 2023-12-06 | End: 2023-12-08 | Stop reason: HOSPADM

## 2023-12-06 RX ORDER — PANTOPRAZOLE SODIUM 40 MG/1
40 TABLET, DELAYED RELEASE ORAL
Status: DISCONTINUED | OUTPATIENT
Start: 2023-12-06 | End: 2023-12-08 | Stop reason: HOSPADM

## 2023-12-06 RX ORDER — OXYCODONE AND ACETAMINOPHEN 10; 325 MG/1; MG/1
1 TABLET ORAL EVERY 6 HOURS PRN
Status: DISCONTINUED | OUTPATIENT
Start: 2023-12-06 | End: 2023-12-08 | Stop reason: HOSPADM

## 2023-12-06 RX ORDER — ASPIRIN 81 MG/1
81 TABLET ORAL DAILY
Status: DISCONTINUED | OUTPATIENT
Start: 2023-12-06 | End: 2023-12-08 | Stop reason: HOSPADM

## 2023-12-06 RX ORDER — LEVOTHYROXINE SODIUM 175 UG/1
175 TABLET ORAL
Status: DISCONTINUED | OUTPATIENT
Start: 2023-12-06 | End: 2023-12-08 | Stop reason: HOSPADM

## 2023-12-06 RX ORDER — LIDOCAINE HYDROCHLORIDE 10 MG/ML
INJECTION, SOLUTION EPIDURAL; INFILTRATION; INTRACAUDAL; PERINEURAL
Status: DISCONTINUED | OUTPATIENT
Start: 2023-12-06 | End: 2023-12-06 | Stop reason: HOSPADM

## 2023-12-06 RX ORDER — MULTIPLE VITAMINS W/ MINERALS TAB 9MG-400MCG
1 TAB ORAL DAILY
Status: DISCONTINUED | OUTPATIENT
Start: 2023-12-06 | End: 2023-12-08 | Stop reason: HOSPADM

## 2023-12-06 RX ORDER — BISACODYL 10 MG
10 SUPPOSITORY, RECTAL RECTAL DAILY PRN
Status: DISCONTINUED | OUTPATIENT
Start: 2023-12-06 | End: 2023-12-08 | Stop reason: HOSPADM

## 2023-12-06 RX ORDER — SODIUM CHLORIDE 9 MG/ML
50 INJECTION, SOLUTION INTRAVENOUS CONTINUOUS
Status: DISCONTINUED | OUTPATIENT
Start: 2023-12-06 | End: 2023-12-07

## 2023-12-06 RX ORDER — MIDAZOLAM HYDROCHLORIDE 1 MG/ML
INJECTION INTRAMUSCULAR; INTRAVENOUS
Status: DISCONTINUED | OUTPATIENT
Start: 2023-12-06 | End: 2023-12-06 | Stop reason: HOSPADM

## 2023-12-06 RX ORDER — OXYCODONE AND ACETAMINOPHEN 10; 325 MG/1; MG/1
1 TABLET ORAL EVERY 8 HOURS PRN
Status: DISCONTINUED | OUTPATIENT
Start: 2023-12-06 | End: 2023-12-06

## 2023-12-06 RX ORDER — AMOXICILLIN 250 MG
2 CAPSULE ORAL 2 TIMES DAILY
Status: DISCONTINUED | OUTPATIENT
Start: 2023-12-06 | End: 2023-12-08 | Stop reason: HOSPADM

## 2023-12-06 RX ORDER — ONDANSETRON 2 MG/ML
4 INJECTION INTRAMUSCULAR; INTRAVENOUS
Status: DISCONTINUED | OUTPATIENT
Start: 2023-12-06 | End: 2023-12-08 | Stop reason: HOSPADM

## 2023-12-06 RX ORDER — NICARDIPINE HCL-0.9% SOD CHLOR 1 MG/10 ML
SYRINGE (ML) INTRAVENOUS
Status: DISCONTINUED | OUTPATIENT
Start: 2023-12-06 | End: 2023-12-06 | Stop reason: HOSPADM

## 2023-12-06 RX ORDER — HEPARIN SODIUM 10000 [USP'U]/100ML
12 INJECTION, SOLUTION INTRAVENOUS
Status: DISCONTINUED | OUTPATIENT
Start: 2023-12-06 | End: 2023-12-06

## 2023-12-06 RX ORDER — HEPARIN SODIUM 1000 [USP'U]/ML
25 INJECTION, SOLUTION INTRAVENOUS; SUBCUTANEOUS AS NEEDED
Status: DISCONTINUED | OUTPATIENT
Start: 2023-12-06 | End: 2023-12-06

## 2023-12-06 RX ORDER — SODIUM CHLORIDE 9 MG/ML
40 INJECTION, SOLUTION INTRAVENOUS AS NEEDED
Status: DISCONTINUED | OUTPATIENT
Start: 2023-12-06 | End: 2023-12-08 | Stop reason: HOSPADM

## 2023-12-06 RX ORDER — ACETAMINOPHEN 160 MG/5ML
650 SOLUTION ORAL EVERY 4 HOURS PRN
Status: DISCONTINUED | OUTPATIENT
Start: 2023-12-06 | End: 2023-12-08 | Stop reason: HOSPADM

## 2023-12-06 RX ORDER — FUROSEMIDE 40 MG/1
80 TABLET ORAL DAILY
Status: DISCONTINUED | OUTPATIENT
Start: 2023-12-06 | End: 2023-12-08 | Stop reason: HOSPADM

## 2023-12-06 RX ORDER — SODIUM CHLORIDE 0.9 % (FLUSH) 0.9 %
10 SYRINGE (ML) INJECTION AS NEEDED
Status: DISCONTINUED | OUTPATIENT
Start: 2023-12-06 | End: 2023-12-08 | Stop reason: HOSPADM

## 2023-12-06 RX ORDER — NITROGLYCERIN 0.4 MG/1
0.4 TABLET SUBLINGUAL
Status: DISCONTINUED | OUTPATIENT
Start: 2023-12-06 | End: 2023-12-08 | Stop reason: HOSPADM

## 2023-12-06 RX ORDER — ACETAMINOPHEN 650 MG/1
650 SUPPOSITORY RECTAL EVERY 4 HOURS PRN
Status: DISCONTINUED | OUTPATIENT
Start: 2023-12-06 | End: 2023-12-08 | Stop reason: HOSPADM

## 2023-12-06 RX ORDER — SODIUM CHLORIDE 0.9 % (FLUSH) 0.9 %
10 SYRINGE (ML) INJECTION EVERY 12 HOURS SCHEDULED
Status: DISCONTINUED | OUTPATIENT
Start: 2023-12-06 | End: 2023-12-08 | Stop reason: HOSPADM

## 2023-12-06 RX ORDER — ACETAMINOPHEN 325 MG/1
650 TABLET ORAL EVERY 4 HOURS PRN
Status: DISCONTINUED | OUTPATIENT
Start: 2023-12-06 | End: 2023-12-08 | Stop reason: HOSPADM

## 2023-12-06 RX ORDER — HEPARIN SODIUM 1000 [USP'U]/ML
4000 INJECTION, SOLUTION INTRAVENOUS; SUBCUTANEOUS ONCE
Status: DISCONTINUED | OUTPATIENT
Start: 2023-12-06 | End: 2023-12-06

## 2023-12-06 RX ORDER — HEPARIN SODIUM 1000 [USP'U]/ML
INJECTION, SOLUTION INTRAVENOUS; SUBCUTANEOUS
Status: DISCONTINUED | OUTPATIENT
Start: 2023-12-06 | End: 2023-12-06 | Stop reason: HOSPADM

## 2023-12-06 RX ORDER — LISINOPRIL 20 MG/1
20 TABLET ORAL DAILY
Status: DISCONTINUED | OUTPATIENT
Start: 2023-12-06 | End: 2023-12-08 | Stop reason: HOSPADM

## 2023-12-06 RX ORDER — CLOPIDOGREL BISULFATE 75 MG/1
75 TABLET ORAL DAILY
Status: DISCONTINUED | OUTPATIENT
Start: 2023-12-07 | End: 2023-12-08 | Stop reason: HOSPADM

## 2023-12-06 RX ORDER — POTASSIUM CHLORIDE 20 MEQ/1
20 TABLET, EXTENDED RELEASE ORAL ONCE
Status: COMPLETED | OUTPATIENT
Start: 2023-12-06 | End: 2023-12-06

## 2023-12-06 RX ORDER — BISACODYL 5 MG/1
5 TABLET, DELAYED RELEASE ORAL DAILY PRN
Status: DISCONTINUED | OUTPATIENT
Start: 2023-12-06 | End: 2023-12-08 | Stop reason: HOSPADM

## 2023-12-06 RX ORDER — FENTANYL CITRATE 50 UG/ML
INJECTION, SOLUTION INTRAMUSCULAR; INTRAVENOUS
Status: DISCONTINUED | OUTPATIENT
Start: 2023-12-06 | End: 2023-12-06 | Stop reason: HOSPADM

## 2023-12-06 RX ORDER — HEPARIN SODIUM 1000 [USP'U]/ML
50 INJECTION, SOLUTION INTRAVENOUS; SUBCUTANEOUS AS NEEDED
Status: DISCONTINUED | OUTPATIENT
Start: 2023-12-06 | End: 2023-12-06

## 2023-12-06 RX ORDER — POLYETHYLENE GLYCOL 3350 17 G/17G
17 POWDER, FOR SOLUTION ORAL DAILY PRN
Status: DISCONTINUED | OUTPATIENT
Start: 2023-12-06 | End: 2023-12-08 | Stop reason: HOSPADM

## 2023-12-06 RX ORDER — SODIUM CHLORIDE 9 MG/ML
1 INJECTION, SOLUTION INTRAVENOUS CONTINUOUS
Status: ACTIVE | OUTPATIENT
Start: 2023-12-06 | End: 2023-12-06

## 2023-12-06 RX ORDER — MORPHINE SULFATE 4 MG/ML
4 INJECTION, SOLUTION INTRAMUSCULAR; INTRAVENOUS
Status: DISCONTINUED | OUTPATIENT
Start: 2023-12-06 | End: 2023-12-08

## 2023-12-06 RX ORDER — CLOPIDOGREL BISULFATE 75 MG/1
TABLET ORAL
Status: DISCONTINUED | OUTPATIENT
Start: 2023-12-06 | End: 2023-12-06 | Stop reason: HOSPADM

## 2023-12-06 RX ORDER — ACETAMINOPHEN 500 MG
1000 TABLET ORAL ONCE
Status: COMPLETED | OUTPATIENT
Start: 2023-12-06 | End: 2023-12-06

## 2023-12-06 RX ADMIN — OXYCODONE HYDROCHLORIDE AND ACETAMINOPHEN 1 TABLET: 10; 325 TABLET ORAL at 09:38

## 2023-12-06 RX ADMIN — POTASSIUM CHLORIDE 20 MEQ: 1500 TABLET, EXTENDED RELEASE ORAL at 06:10

## 2023-12-06 RX ADMIN — OXYCODONE HYDROCHLORIDE AND ACETAMINOPHEN 1 TABLET: 10; 325 TABLET ORAL at 04:19

## 2023-12-06 RX ADMIN — HEPARIN SODIUM 12 UNITS/KG/HR: 10000 INJECTION, SOLUTION INTRAVENOUS at 06:16

## 2023-12-06 RX ADMIN — LISINOPRIL 20 MG: 20 TABLET ORAL at 09:03

## 2023-12-06 RX ADMIN — ACETAMINOPHEN 1000 MG: 500 TABLET ORAL at 00:46

## 2023-12-06 RX ADMIN — SODIUM CHLORIDE 50 ML/HR: 9 INJECTION, SOLUTION INTRAVENOUS at 04:19

## 2023-12-06 RX ADMIN — ASPIRIN 81 MG: 81 TABLET, COATED ORAL at 09:03

## 2023-12-06 RX ADMIN — NITROGLYCERIN 1 INCH: 20 OINTMENT TOPICAL at 00:46

## 2023-12-06 RX ADMIN — ROSUVASTATIN 20 MG: 20 TABLET, FILM COATED ORAL at 20:47

## 2023-12-06 RX ADMIN — Medication 1 TABLET: at 09:03

## 2023-12-06 RX ADMIN — FUROSEMIDE 80 MG: 40 TABLET ORAL at 09:03

## 2023-12-06 RX ADMIN — ONDANSETRON 4 MG: 2 INJECTION INTRAMUSCULAR; INTRAVENOUS at 17:40

## 2023-12-06 RX ADMIN — ACETAMINOPHEN 650 MG: 325 TABLET ORAL at 06:27

## 2023-12-06 RX ADMIN — SODIUM CHLORIDE 50 ML/HR: 9 INJECTION, SOLUTION INTRAVENOUS at 18:24

## 2023-12-06 RX ADMIN — LEVOTHYROXINE SODIUM 175 MCG: 0.17 TABLET ORAL at 06:10

## 2023-12-06 RX ADMIN — PANTOPRAZOLE SODIUM 40 MG: 40 TABLET, DELAYED RELEASE ORAL at 06:10

## 2023-12-06 RX ADMIN — OXYCODONE HYDROCHLORIDE AND ACETAMINOPHEN 1 TABLET: 10; 325 TABLET ORAL at 20:47

## 2023-12-06 NOTE — ED PROVIDER NOTES
[place]    EMERGENCY DEPARTMENT ENCOUNTER      Pt Name: Ludy Sotck  MRN: [id number]  YOB: 1950  Date of evaluation: 12/5/2023  Provider: Jared Malone DO    CHIEF COMPLAINT       Chief Complaint   Patient presents with    Chest Pain     HPI  Stated Reason for Visit: PT came from home via EMS after having crushing chest pain for about 1 hour prior to arrival in the substernal region radiating into her jaw. She rated it a 10/10 pain when EMS arrived, 5/10 when arrived at ED> PT has extensive cardiac history with a pacemaker, atrial paced. She felt nauseated in the ambulance and was given zofran in route. PT took 2 nitro tablets before EMS arrived and was also given a third dosage with EMS along with aspirin. History Obtained From: EMS     HISTORY OF PRESENT ILLNESS  (Location/Symptom, Timing/Onset, Context/Setting, Quality, Duration, Modifying Factors, Severity.)   Ludy Stock is a 73 y.o. female who presents to the emergency department via EMS for retrosternal chest pain and pressure which started just about an hour prior to arrival.  She notes pain was severe in nature, rating to middle and upper back bilaterally and then started radiating up towards her jaw.  She was taking her sublingual nitro at home, EMS gave her a couple in route with aspirin.  She is a history of coronary disease, atrial pacemaker, 5 previous stents, follows with cardiologist, Dr. Rachel.  She had some intermittent pains over the last couple days, but nothing to this extent.  Denies any recent illness, no fever, chills, no nausea, vomiting, diarrhea.  Denies any difficulty breathing.  No other acute systemic complaints at this time.  She is compliant with her Eliquis.  Pain initially pretty severe 10 out of 10 has improved to a 4 out of 10 currently.      Nursing notes were reviewed.      PAST MEDICAL HISTORY     Past Medical History:   Diagnosis Date    Coronary artery disease     Crohn's disease, small and  large intestine     DVT (deep venous thrombosis) 1995    Fibromyalgia     Heart disease     Hypertension     Parathyroid disease     status post parathyroidectomy.    Peptic ulcer disease     PONV (postoperative nausea and vomiting)     PTSD (post-traumatic stress disorder)     Pulmonary hypertension     Symptomatic bradycardia     Systemic lupus erythematosus          SURGICAL HISTORY       Past Surgical History:   Procedure Laterality Date    BLADDER RESECTION LAPAROSCOPIC      BREAST LUMPECTOMY      BUNIONECTOMY      BILATERAL     CARPAL TUNNEL RELEASE Right     COLON RESECTION      Partial x2    COSMETIC SURGERY      Bilateral breast implants    HYSTERECTOMY      ICD GENERATOR REPLACEMENT N/A 3/30/2023    Procedure: ICD DC generator change, possible lead revision;  Surgeon: Paul Polo DO;  Location: [facility] INVASIVE LOCATION;  Service: Cardiology;  Laterality: N/A;    TONSILLECTOMY           CURRENT MEDICATIONS       Current Facility-Administered Medications:     albuterol (PROVENTIL) nebulizer solution 0.083% 2.5 mg/3mL, 2.5 mg, Nebulization, Once, Tejal Sands S, APRN    aspirin chewable tablet 324 mg, 324 mg, Oral, Once, Jared Malone,     Morphine sulfate (PF) injection 4 mg, 4 mg, Intravenous, Q30 Min PRN, Jared Malone DO    ondansetron (ZOFRAN) injection 4 mg, 4 mg, Intravenous, Q30 Min PRN, Jared Malone,     sodium chloride 0.9 % flush 10 mL, 10 mL, Intravenous, PRN, Jared Malone DO    Current Outpatient Medications:     furosemide (LASIX) 80 MG tablet, Take 1 tablet by mouth Daily. (Patient taking differently: Take 1 tablet by mouth As Needed.), Disp: 90 tablet, Rfl: 2    oxyCODONE (OxyCONTIN) 10 MG 12 hr tablet, Take 4 tablets by mouth 3 (Three) Times a Day., Disp: , Rfl:     apixaban (ELIQUIS) 5 MG tablet tablet, Take 1 tablet by mouth 2 (Two) Times a Day., Disp: , Rfl:     aspirin 81 MG EC tablet, Take  by mouth Daily., Disp: , Rfl:     Coenzyme Q10  (CoQ-10) 100 MG capsule, Take  by mouth Daily., Disp: , Rfl:     diclofenac (VOLTAREN) 1 % gel gel, Place  on the skin As Needed., Disp: , Rfl:     levothyroxine (SYNTHROID, LEVOTHROID) 175 MCG tablet, Take  by mouth Daily., Disp: , Rfl:     linaclotide (LINZESS) 290 MCG capsule capsule, Take 1 capsule by mouth Every Morning Before Breakfast., Disp: , Rfl:     lisinopril (PRINIVIL,ZESTRIL) 20 MG tablet, Take 1 tablet by mouth Daily., Disp: , Rfl:     multivitamin with minerals tablet tablet, Take 1 tablet by mouth Daily., Disp: , Rfl:     nitroglycerin (NITROSTAT) 0.4 MG SL tablet, Place  under the tongue As Needed., Disp: , Rfl:     omeprazole (priLOSEC) 40 MG capsule, Take 1 capsule by mouth Daily., Disp: , Rfl:     potassium chloride (K-DUR,KLOR-CON) 20 MEQ CR tablet, Take 1 tablet by mouth As Needed., Disp: , Rfl:     ALLERGIES     Patient has no known allergies.    FAMILY HISTORY       Family History   Problem Relation Age of Onset    Heart attack Father           SOCIAL HISTORY       Social History     Socioeconomic History    Marital status:    Tobacco Use    Smoking status: Never   Substance and Sexual Activity    Alcohol use: No    Drug use: No    Sexual activity: Defer         PHYSICAL EXAM    (up to 7 for level 4, 8 or more for level 5)     Vitals:    12/05/23 2355 12/05/23 2356 12/06/23 0031 12/06/23 0046   BP: 144/75  119/76 119/76   BP Location: Right arm      Patient Position: Lying      Pulse:   69 70   Resp:       Temp: 98.7 °F (37.1 °C)      TempSrc: Oral      SpO2:  95% 99% 95%   Weight:       Height:           Physical Exam  General : Patient is awake, alert, oriented, in no acute distress, nontoxic appearing  HEENT: Pupils are equally round, EOMI, conjunctivae clear  Neck: Neck is supple, full range of motion, trachea midline  Cardiac: Heart regular rate, rhythm, no murmurs, rubs, or gallops  Lungs: Lungs are clear to auscultation, there is no wheezing, rhonchi, or rales. There is no use  of accessory muscles  Chest wall: There is no tenderness to palpation over the chest wall or over ribs  Abdomen: Abdomen is soft, nontender, nondistended. There are no firm or pulsatile masses, no rebound rigidity or guarding  Musculoskeletal: No peripheral edema, 5 out of 5 strength in all 4 extremities.  No focal muscle deficits are appreciated  Neuro: Motor intact, sensory intact, level of consciousness is normal, GCS 15  Dermatology: Skin is warm and dry  Psych: Mentation is grossly normal, cognition is grossly normal. Affect is appropriate      DIAGNOSTIC RESULTS     EKG:  All EKGs are interpreted by the Emergency Department Physician who either signs or Co-signs this chart in the absence of a cardiologist.    ECG 12 Lead ED Triage Standing Order; Chest Pain   Final Result   Test Reason : ED Triage Standing Order~   Blood Pressure :   */*   mmHG   Vent. Rate :  70 BPM     Atrial Rate : 220 BPM      P-R Int : 232 ms          QRS Dur : 104 ms       QT Int : 398 ms       P-R-T Axes : -29 -23   0 degrees      QTc Int : 429 ms      Atrial-paced rhythm with prolonged AV conduction   T wave inversions ant septal leads   Abnormal ECG   When compared with ECG of 20-AUG-2022 09:16,   Nonspecific T wave abnormality, worse in Inferior leads   QT has shortened   Confirmed by TAMIKA VIZCAINO MD ([id number]) on 12/5/2023 11:51:04 PM      Referred By: EDMD           Confirmed By: TAMIKA VIZCAINO MD      ECG 12 Lead ED Triage Standing Order; Chest Pain    (Results Pending)       RADIOLOGY:     [x] Radiologist's Report Reviewed:  XR Chest 1 View   Final Result   Impression:   No active disease         Electronically Signed: Benny Jerome MD     12/6/2023 12:24 AM EST     Workstation ID: [id number]          I ordered and independently reviewed the above noted radiographic studies.      I viewed images of chest x-ray which showed no acute cardiopulmonary process per my independent interpretation.    See radiologist's dictation for official  interpretation.      ED BEDSIDE ULTRASOUND:   Performed by ED Physician - none    LABS:    I have reviewed and interpreted all of the currently available lab results from this visit (if applicable):  Results for orders placed or performed during the hospital encounter of 12/05/23   High Sensitivity Troponin T    Specimen: Arm, Right; Blood   Result Value Ref Range    HS Troponin T 24 (H) <14 ng/L   Comprehensive Metabolic Panel    Specimen: Blood   Result Value Ref Range    Glucose 114 (H) 65 - 99 mg/dL    BUN 17 8 - 23 mg/dL    Creatinine 0.89 0.57 - 1.00 mg/dL    Sodium 142 136 - 145 mmol/L    Potassium 3.9 3.5 - 5.2 mmol/L    Chloride 107 98 - 107 mmol/L    CO2 26.0 22.0 - 29.0 mmol/L    Calcium 8.9 8.6 - 10.5 mg/dL    Total Protein 5.5 (L) 6.0 - 8.5 g/dL    Albumin 3.7 3.5 - 5.2 g/dL    ALT (SGPT) <5 1 - 33 U/L    AST (SGOT) 17 1 - 32 U/L    Alkaline Phosphatase 79 39 - 117 U/L    Total Bilirubin 0.2 0.0 - 1.2 mg/dL    Globulin 1.8 gm/dL    A/G Ratio 2.1 g/dL    BUN/Creatinine Ratio 19.1 7.0 - 25.0    Anion Gap 9.0 5.0 - 15.0 mmol/L    eGFR 68.1 >60.0 mL/min/1.73   Lipase    Specimen: Blood   Result Value Ref Range    Lipase 16 13 - 60 U/L   BNP    Specimen: Blood   Result Value Ref Range    proBNP 503.5 0.0 - 900.0 pg/mL   CBC Auto Differential    Specimen: Blood   Result Value Ref Range    WBC 4.70 3.40 - 10.80 10*3/mm3    RBC 3.89 3.77 - 5.28 10*6/mm3    Hemoglobin 12.0 12.0 - 15.9 g/dL    Hematocrit 36.5 34.0 - 46.6 %    MCV 93.8 79.0 - 97.0 fL    MCH 30.8 26.6 - 33.0 pg    MCHC 32.9 31.5 - 35.7 g/dL    RDW 13.3 12.3 - 15.4 %    RDW-SD 45.8 37.0 - 54.0 fl    MPV 10.5 6.0 - 12.0 fL    Platelets 286 140 - 450 10*3/mm3    Neutrophil % 45.8 42.7 - 76.0 %    Lymphocyte % 37.4 19.6 - 45.3 %    Monocyte % 9.8 5.0 - 12.0 %    Eosinophil % 5.3 0.3 - 6.2 %    Basophil % 1.3 0.0 - 1.5 %    Immature Grans % 0.4 0.0 - 0.5 %    Neutrophils, Absolute 2.15 1.70 - 7.00 10*3/mm3    Lymphocytes, Absolute 1.76 0.70 - 3.10  10*3/mm3    Monocytes, Absolute 0.46 0.10 - 0.90 10*3/mm3    Eosinophils, Absolute 0.25 0.00 - 0.40 10*3/mm3    Basophils, Absolute 0.06 0.00 - 0.20 10*3/mm3    Immature Grans, Absolute 0.02 0.00 - 0.05 10*3/mm3    nRBC 0.0 0.0 - 0.2 /100 WBC   ECG 12 Lead ED Triage Standing Order; Chest Pain   Result Value Ref Range    QT Interval 398 ms    QTC Interval 429 ms   Lavender Top   Result Value Ref Range    Extra Tube hold for add-on    Gold Top - SST   Result Value Ref Range    Extra Tube Hold for add-ons.    Light Blue Top   Result Value Ref Range    Extra Tube Hold for add-ons.         If labs were ordered, I independently reviewed the results and considered them in treating the patient.      EMERGENCY DEPARTMENT COURSE and DIFFERENTIAL DIAGNOSIS/MDM:   Vitals:  AS OF 01:32 EST    BP - 119/76  HR - 70  TEMP - 98.7 °F (37.1 °C) (Oral)  O2 SATS - 95%      Orders placed during this visit:  Orders Placed This Encounter   Procedures    XR Chest 1 View    [place] Draw    High Sensitivity Troponin T    Comprehensive Metabolic Panel    Lipase    BNP    CBC Auto Differential    High Sensitivity Troponin T 2Hr    NPO Diet NPO Type: Strict NPO    Undress & Gown    Continuous Pulse Oximetry    Oxygen Therapy- Nasal Cannula; Titrate 1-6 LPM Per SpO2; 90 - 95%    ECG 12 Lead ED Triage Standing Order; Chest Pain    ECG 12 Lead ED Triage Standing Order; Chest Pain    Insert Peripheral IV    CBC & Differential    Green Top (Gel)    Lavender Top    Gold Top - SST    Gray Top    Light Blue Top       All labs have been independently reviewed by me.  All radiology studies have been reviewed by me and the radiologist dictating the report.  All EKG's have been independently viewed and interpreted by me.      Discussion below represents my analysis of pertinent findings related to patient's condition, differential diagnosis, treatment plan and final disposition.    Differential diagnosis:  The differential diagnosis associated with the  patient's presentation includes: NSTEMI, ischemic cardiomyopathy, CAD, unstable angina    Additional sources  Discussed/ obtained information from independent historians:   [] Spouse  [] Parent  [] Family member  [] Friend  [x] EMS, [place]   [] Other:    External (non-ED) record review:   [] Inpatient record:   [] Office record:   [] Outpatient record:   [] Prior Outpatient labs:   [] Prior Outpatient radiology:   [] Primary Care record:   [] Outside ED record:   [] Other:     Patient's care impacted by:   [] Diabetes  [x] Hypertension  [x] CHF  [] Hyperlipidemia  [x] Coronary Artery Disease   [] COPD   [] Cancer   [] Tobacco Abuse   [] Substance Abuse    [] Other:     Care significantly affected by Social Determinants of Health (housing and economic circumstances, unemployment)    [] Yes     [x] No   If yes, Patient's care significantly limited by Social Determinants of Health including:   [] Inadequate housing   [] Low income   [] Alcoholism and drug addiction in family   [] Problems related to primary support group   [] Unemployment   [] Problems related to employment   [] Other Social Determinants of Health:       MEDICATIONS ADMINISTERED IN ED:  Medications   sodium chloride 0.9 % flush 10 mL (has no administration in time range)   aspirin chewable tablet 324 mg (324 mg Oral Not Given 12/6/23 0043)   Morphine sulfate (PF) injection 4 mg (has no administration in time range)   ondansetron (ZOFRAN) injection 4 mg (has no administration in time range)   nitroglycerin (NITROSTAT) ointment 1 inch (1 inch Topical Given 12/6/23 0046)   acetaminophen (TYLENOL) tablet 1,000 mg (1,000 mg Oral Given 12/6/23 0046)              73-year-old female with extensive cardiac history presents with retrosternal chest pain rating to the bilateral jaw.  Similar presentation from her previous cardiac complications requiring multiple stents.  Initial EKG atrial paced, T wave inversions in anteroseptal leads, no signs of acute  ischemic process.  Symptoms improved after nitroglycerin and aspirin in route.  We will proceed forward with IV, labs, patient was given nitro drip, symptomatic medications.  Imaging obtained.  Results reviewed as above.  Labs with a stable kidney function, white count is 4.7, stable hemoglobin.  Her BNP is normal, initial high-sensitivity troponin of 24.  Patient has remained stable with her Nitropaste, she has a heart score of 7, with her clinical presentation, history, significant risk factors we will plan for admission the hospital for further work-up treatment evaluation.  Case discussed with hospitalist, Dr. Everett.     PROCEDURES:  Procedures    CRITICAL CARE TIME    Total Critical Care time was 0 minutes, excluding separately reportable procedures.   There was a high probability of clinically significant/life threatening deterioration in the patient's condition which required my urgent intervention.      FINAL IMPRESSION      1. Chest pain, unspecified type    2. Coronary artery disease involving native heart with unstable angina pectoris, unspecified vessel or lesion type          DISPOSITION/PLAN     ED Disposition       ED Disposition   Decision to Admit    Condition   --    Comment   --               Comment: Please note this report has been produced using speech recognition software.      Jaerd Malone DO  Attending Emergency Physician         Jared Malone DO  12/06/23 0132

## 2023-12-06 NOTE — H&P
"    [facility] Medicine Services  HISTORY AND PHYSICAL    Patient Name: Ludy Stock  : 1950  MRN: [id number]  Primary Care Physician: Roger Hopper MD  Date of admission: 2023    Subjective   Subjective     Chief Complaint:  Chest pain     HPI:  Ludy Stock is a 73 y.o. female with PMH significant for CAD, PAF on Eliquis, DVT, HTN, hypothyroid disease, peptic ulcer disease, PTSD, pulmonary hypertension, SSS s/p PPM, SLE, who presents to the ED with complaint of chest pain.  She states that this evening she developed acute onset of midsternal chest pain that she describes as \"crushing and pressure.\"  She states that she took 1 NTG at home as well as aspirin but the pain returned.  She called EMS and received another NTG as well as Zofran and route.  She had associated shortness of breath and nausea with vomiting.  She denies diaphoresis.  Upon arrival to the ED, she has mildly elevated troponin.  CXR is negative for acute findings.  EKG is negative for acute ischemia.  She continues to report chest pain 5/10.  She will be admitted to hospital medicine for further evaluation.    Review of Systems   Constitutional:  Negative for activity change, appetite change, chills, diaphoresis, fatigue and fever.   HENT: Negative.  Negative for congestion, sore throat and trouble swallowing.    Eyes: Negative.  Negative for visual disturbance.   Respiratory:  Positive for chest tightness and shortness of breath. Negative for cough and wheezing.    Cardiovascular:  Positive for chest pain. Negative for palpitations and leg swelling.   Gastrointestinal:  Positive for nausea and vomiting. Negative for abdominal distention, abdominal pain, constipation and diarrhea.   Endocrine: Negative.  Negative for polydipsia and polyphagia.   Genitourinary: Negative.  Negative for difficulty urinating, dysuria, frequency and urgency.   Musculoskeletal: Negative.  Negative for arthralgias, back " pain, gait problem and myalgias.   Skin: Negative.  Negative for color change, pallor, rash and wound.   Allergic/Immunologic: Negative.  Negative for immunocompromised state.   Neurological:  Positive for weakness. Negative for dizziness, seizures, syncope, facial asymmetry, speech difficulty, light-headedness, numbness and headaches.   Hematological: Negative.  Does not bruise/bleed easily.   Psychiatric/Behavioral:  Negative for confusion. The patient is not nervous/anxious.         Personal History     Past Medical History:   Diagnosis Date    Coronary artery disease     Crohn's disease, small and large intestine     DVT (deep venous thrombosis) 1995    Fibromyalgia     Heart disease     Hypertension     Parathyroid disease     status post parathyroidectomy.    Peptic ulcer disease     PONV (postoperative nausea and vomiting)     PTSD (post-traumatic stress disorder)     Pulmonary hypertension     Symptomatic bradycardia     Systemic lupus erythematosus          Past Surgical History:   Procedure Laterality Date    BLADDER RESECTION LAPAROSCOPIC      BREAST LUMPECTOMY      BUNIONECTOMY      BILATERAL     CARPAL TUNNEL RELEASE Right     COLON RESECTION      Partial x2    COSMETIC SURGERY      Bilateral breast implants    HYSTERECTOMY      ICD GENERATOR REPLACEMENT N/A 3/30/2023    Procedure: ICD DC generator change, possible lead revision;  Surgeon: Paul Polo DO;  Location: [facility] INVASIVE LOCATION;  Service: Cardiology;  Laterality: N/A;    TONSILLECTOMY         Family History:  family history includes Heart attack in her father.     Social History:  reports that she has never smoked. She does not have any smokeless tobacco history on file. She reports that she does not drink alcohol and does not use drugs.  Social History     Social History Narrative    Not on file       Medications:  CoQ-10, apixaban, aspirin, diclofenac, furosemide, levothyroxine, linaclotide, lisinopril, multivitamin with minerals,  nitroglycerin, omeprazole, oxyCODONE, and potassium chloride    No Known Allergies    Objective   Objective     Vital Signs:   Temp:  [98.7 °F (37.1 °C)] 98.7 °F (37.1 °C)  Heart Rate:  [69-72] 70  Resp:  [18] 18  BP: (119-157)/(74-88) 157/88  Flow (L/min):  [2] 2    Physical Exam   Constitutional: Awake, alert, no acute distress   Eyes: PERRLA, sclerae anicteric, no conjunctival injection  HENT: NCAT, mucous membranes moist  Neck: Supple, no thyromegaly, no lymphadenopathy, trachea midline  Respiratory: Clear to auscultation bilaterally, nonlabored respirations   Cardiovascular: RRR, no murmurs, rubs, or gallops, palpable pedal pulses bilaterally  Gastrointestinal: Positive bowel sounds, soft, nontender, nondistended  Musculoskeletal: No bilateral ankle edema, no clubbing or cyanosis to extremities  Psychiatric: Appropriate affect, cooperative  Neurologic: Oriented x 3, strength symmetric in all extremities, Cranial Nerves grossly intact to confrontation, speech clear  Skin: No rashes      Result Review:  I have personally reviewed the results from the time of this admission to 12/6/2023 02:36 EST and agree with these findings:  [x]  Laboratory list / accordion  [x]  Microbiology  [x]  Radiology  [x]  EKG/Telemetry   []  Cardiology/Vascular   []  Pathology  [x]  Old records  []  Other:  Most notable findings include:     LAB RESULTS:      Lab 12/05/23 2355   WBC 4.70   HEMOGLOBIN 12.0   HEMATOCRIT 36.5   PLATELETS 286   NEUTROS ABS 2.15   IMMATURE GRANS (ABS) 0.02   LYMPHS ABS 1.76   MONOS ABS 0.46   EOS ABS 0.25   MCV 93.8   PROTIME 12.3   D DIMER QUANT 0.50         Lab 12/05/23 2355   SODIUM 142   POTASSIUM 3.9   CHLORIDE 107   CO2 26.0   ANION GAP 9.0   BUN 17   CREATININE 0.89   EGFR 68.1   GLUCOSE 114*   CALCIUM 8.9         Lab 12/05/23 2355   TOTAL PROTEIN 5.5*   ALBUMIN 3.7   GLOBULIN 1.8   ALT (SGPT) <5   AST (SGOT) 17   BILIRUBIN 0.2   ALK PHOS 79   LIPASE 16         Lab 12/06/23  0058 12/05/23 2355    PROBNP  --  503.5   HSTROP T 24*  --    PROTIME  --  12.3   INR  --  0.91                 Brief Urine Lab Results       None          Microbiology Results (last 10 days)       ** No results found for the last 240 hours. **            XR Chest 1 View    Result Date: 12/6/2023  XR CHEST 1 VW Date of Exam: 12/6/2023 12:21 AM EST Indication: Chest Pain Triage Protocol Comparison: Chest radiograph dated September 23, 2015 Findings: There is a left-sided dual-lead pacemaker device in place with leads over the right atrium and right ventricle. The heart is enlarged. The pulmonary vascular markings are normal. The lungs are clear     Impression: Impression: No active disease Electronically Signed: Benny Jerome MD  12/6/2023 12:24 AM EST  Workstation ID: [id number]         Assessment & Plan   Assessment & Plan       Chest pain    Peptic ulcer disease    Crohn's disease, small and large intestine    SSS (sick sinus syndrome)    Coronary artery disease involving native coronary artery of native heart with other form of angina pectoris    PAF (paroxysmal atrial fibrillation)    Diastolic CHF, chronic    73 y.o. female with PMH significant for CAD, PAF on Eliquis, DVT, HTN, hypothyroid disease, peptic ulcer disease, PTSD, pulmonary hypertension, SSS s/p PPM, SLE, who presents to the ED with complaint of chest pain.    Chest pain  CAD  - Mildly elevated troponin, continue to trend  - Trend EKG  - Cardiology consult in the a.m., Dr. Rachel  - NTG paste in place  - CBC, BMP, lipid panel, hemoglobin A1c in the a.m.  - Consider NTG gtt. pending 2-hour troponin and PT/INR as patient reported taking Eliquis this a.m.  - D-dimer negative  - ASA given prior to arrival, continue daily dose  - Continue lisinopril  ADDENDUM: Pt troponin increased, will start heparin gtt    PAF  SSS  - S/p PPM  - Currently rate controlled  - Heparin gtt    Diastolic CHF  - Continue daily Lasix  - Echo in the a.m.  - Daily weight    Hypothyroid  - Continue  levothyroxine    Peptic ulcer disease  - Continue PPI      DVT prophylaxis: On Eliquis     CODE STATUS:    Code Status (Patient has no pulse and is not breathing): CPR (Attempt to Resuscitate)  Medical Interventions (Patient has pulse or is breathing): Full Support      Expected Discharge  Expected Discharge Date: 12/7/2023; Expected Discharge Time:     Signature: Electronically signed by ESTHER Dorado, 12/06/23, 2:37 AM EST.  Total time spent: 65 minutes   Time spent includes time reviewing chart, face-to-face time, counseling patient/family/caregiver, ordering medications/tests/procedures, communicating with other health care professionals, documenting clinical information in the electronic health record, and coordination of care.

## 2023-12-06 NOTE — PROGRESS NOTES
[facility] Medicine Services  PROGRESS NOTE    Patient Name: Ludy Stock  : 1950  MRN: [id number]    Date of Admission: 2023  Primary Care Physician: Roger Hopper MD    Subjective   Subjective     CC:  Chest pain    HPI:  Some chest pressure still this am, but overall improved.       Objective   Objective     Vital Signs:   Temp:  [98.7 °F (37.1 °C)] 98.7 °F (37.1 °C)  Heart Rate:  [69-72] 70  Resp:  [18] 18  BP: (119-157)/(74-88) 157/88  Flow (L/min):  [2] 2     Physical Exam:  Constitutional: No acute distress, awake, alert  HENT: NCAT, mucous membranes moist  Respiratory: Clear to auscultation bilaterally, respiratory effort normal   Cardiovascular: RRR, no murmurs, rubs, or gallops  Gastrointestinal: Positive bowel sounds, soft, nontender, nondistended  Musculoskeletal: No bilateral ankle edema  Psychiatric: Appropriate affect, cooperative  Neurologic: Oriented x 3, strength symmetric in all extremities, Cranial Nerves grossly intact to confrontation, speech clear  Skin: No rashes    Results Reviewed:  LAB RESULTS:      Lab 23  2355   WBC 4.70   HEMOGLOBIN 12.0   HEMATOCRIT 36.5   PLATELETS 286   NEUTROS ABS 2.15   IMMATURE GRANS (ABS) 0.02   LYMPHS ABS 1.76   MONOS ABS 0.46   EOS ABS 0.25   MCV 93.8   PROTIME 12.3   D DIMER QUANT 0.50         Lab 23  2355   SODIUM 142   POTASSIUM 3.9   CHLORIDE 107   CO2 26.0   ANION GAP 9.0   BUN 17   CREATININE 0.89   EGFR 68.1   GLUCOSE 114*   CALCIUM 8.9         Lab 23  2355   TOTAL PROTEIN 5.5*   ALBUMIN 3.7   GLOBULIN 1.8   ALT (SGPT) <5   AST (SGOT) 17   BILIRUBIN 0.2   ALK PHOS 79   LIPASE 16         Lab 23  0300 23  0058 23  2355   PROBNP  --   --  503.5   HSTROP T 40* 24*  --    PROTIME  --   --  12.3   INR  --   --  0.91                 Brief Urine Lab Results       None            Microbiology Results Abnormal       None            XR Chest 1 View    Result Date: 2023  XR  CHEST 1 VW Date of Exam: 12/6/2023 12:21 AM EST Indication: Chest Pain Triage Protocol Comparison: Chest radiograph dated September 23, 2015 Findings: There is a left-sided dual-lead pacemaker device in place with leads over the right atrium and right ventricle. The heart is enlarged. The pulmonary vascular markings are normal. The lungs are clear     Impression: Impression: No active disease Electronically Signed: Benny Jerome MD  12/6/2023 12:24 AM EST  Workstation ID: [id number]         Current medications:  Scheduled Meds:aspirin, 324 mg, Oral, Once  aspirin, 81 mg, Oral, Daily  furosemide, 80 mg, Oral, Daily  heparin (porcine), 4,000 Units, Intravenous, Once  levothyroxine, 175 mcg, Oral, Q AM  lisinopril, 20 mg, Oral, Daily  multivitamin with minerals, 1 tablet, Oral, Daily  pantoprazole, 40 mg, Oral, Q AM  potassium chloride, 20 mEq, Oral, Once  senna-docusate sodium, 2 tablet, Oral, BID  sodium chloride, 10 mL, Intravenous, Q12H      Continuous Infusions:heparin, 12 Units/kg/hr  Pharmacy to Dose Heparin,   sodium chloride, 50 mL/hr, Last Rate: 50 mL/hr (12/06/23 0419)      PRN Meds:.  acetaminophen **OR** acetaminophen **OR** acetaminophen    senna-docusate sodium **AND** polyethylene glycol **AND** bisacodyl **AND** bisacodyl    heparin (porcine)    heparin (porcine)    Morphine    ondansetron    oxyCODONE-acetaminophen    Pharmacy to Dose Heparin    sodium chloride    sodium chloride    sodium chloride    Assessment & Plan   Assessment & Plan     Active Hospital Problems    Diagnosis  POA    **Chest pain [R07.9]  Yes    PAF (paroxysmal atrial fibrillation) [I48.0]  Yes    Diastolic CHF, chronic [I50.32]  Yes    Coronary artery disease involving native coronary artery of native heart with other form of angina pectoris [I25.118]  Yes    SSS (sick sinus syndrome) [I49.5]  Yes    Peptic ulcer disease [K27.9]  Yes    Crohn's disease, small and large intestine [K50.80]  Yes      Resolved Hospital Problems   No  resolved problems to display.        Brief Hospital Course to date:  Ludy Stock is a 73 y.o. female with PMH significant for CAD, PAF on Eliquis, DVT, HTN, hypothyroid disease, peptic ulcer disease, PTSD, pulmonary hypertension, SSS s/p PPM, SLE, who presented to the ED with complaint of chest pain.     Chest pain  CAD s/p multiple stents last in 2019 (in [place])  - Mildly elevated troponin, continue to trend to peak  - EKG with TWI in anterior leads per my read, appears consistent with prior EKGs   - Cardiology consulted, follows with Dr. Rachel  - NTG paste in place  - CBC, BMP, lipid panel, hemoglobin A1c in the a.m.  - heparin gtt started  - D-dimer negative  - ASA given prior to arrival, continue daily dose  - Continue lisinopril  -- plan for C today per Cardiology, NPO for procedure     PAF  SSS  - S/p PPM  - Currently rate controlled  - Continue Eliquis twice daily once off heparin gtt     Diastolic CHF  - Continue daily Lasix  - repeat TTE PENDING  - Daily weight     Hypothyroid  - Continue levothyroxine     Peptic ulcer disease  - Continue PPI    Total time spent: Time Spent: Time Spent: 25 minutes  Time spent includes time reviewing chart, face-to-face time, counseling patient/family/caregiver, ordering medications/tests/procedures, communicating with other health care professionals, documenting clinical information in the electronic health record, and coordination of care.      Expected Discharge Location and Transportation: home  Expected Discharge   Expected Discharge Date: 12/7/2023; Expected Discharge Time:      DVT prophylaxis:  Medical DVT prophylaxis orders are present.     AM-PAC 6 Clicks Score (PT): 22 (12/06/23 6966)    CODE STATUS:   Code Status and Medical Interventions:   Ordered at: 12/06/23 0236     Code Status (Patient has no pulse and is not breathing):    CPR (Attempt to Resuscitate)     Medical Interventions (Patient has pulse or is breathing):    Full Support       Nicci  Rahul Chavez MD  12/06/23

## 2023-12-06 NOTE — PROGRESS NOTES
I was asked by Dr. Little to perform LHC +/- CBI for this pt due to unstable angina and hx of PCI x 5 in past. I met and examined the patient prior to procedure.  The risks, benefits, and alternative options of cardiac catheterization were discussed with the patient.  The risks include death, MI, stroke, infection, vascular injury requiring surgical repair and/or blood transfusion, coronary dissection, renal dysfunction/failure, allergic reaction, emergent CABG.  If PCI is needed there is a 1-2% risk of emergent CABG.  The patient is agreeable for cardiac catheterization, possible PCI or CABG. Plan is to proceed with cardiac catheterization and possible PCI.     German Kelley MD, [facility], [facility]  Interventional Cardiology  12/06/23  11:57 EST

## 2023-12-06 NOTE — PLAN OF CARE
Goal Outcome Evaluation:  Plan of Care Reviewed With: patient        Progress: no change  Outcome Evaluation: Pt arrived from ED to S206. VSS. 2LNC. A paced on the monitor. A&O x4. PRN percocet given for back/joint pain per pt request. Plan for inpatient cards consult and Echo today. IVFs infusing. Pt resting comfortably at this time with no further complaints. Will continue to follow POC.

## 2023-12-06 NOTE — CONSULTS
[facility] at [facility]  Cardiovascular Consultation Note    Reason for consultation: Chest pain and history with CAD and prior stenting and elevated high-sensitivity troponin    History of Present Illness:  73-year-old female patient of Dr. Bernal with history of previous stenting to the right coronary artery.  Also has pacemaker for sick sinus syndrome.  In March 2023 she had a pulse generator change.  She also has hypertension hyperlipidemia previous DVT PAF pulmonary hypertension.  She presents complaining of chest pain.  The patient states she has been experiencing discomfort in her chest for several months.  She describes it as intense heaviness.  It has been increasing in frequency duration and severity.  Yesterday the patient had the onset of chest discomfort described as crushing chest pain going into both her jaws with shortness of breath and nausea.  She also complains of severe fatigue which has been progressive.  She also had increasing dyspnea on exertion.  She complains of occasional fluttering in her chest.  No edema.  No claudication type symptoms.  She complains of anterior leg numbness.    Cardiac risk factors: #1 age greater than 65 #2 previous history of CAD #3 hypertension #4 hyperlipidemia    Past medical and surgical history, social and family history reviewed in EMR.    REVIEW OF SYSTEMS:     Past Medical History:   Diagnosis Date    Coronary artery disease     Crohn's disease, small and large intestine     DVT (deep venous thrombosis) 1995    Fibromyalgia     Heart disease     Hypertension     Parathyroid disease     status post parathyroidectomy.    Peptic ulcer disease     PONV (postoperative nausea and vomiting)     PTSD (post-traumatic stress disorder)     Pulmonary hypertension     Symptomatic bradycardia     Systemic lupus erythematosus      Past Surgical History:   Procedure Laterality Date    BLADDER RESECTION LAPAROSCOPIC      BREAST LUMPECTOMY       BUNIONECTOMY      BILATERAL     CARPAL TUNNEL RELEASE Right     COLON RESECTION      Partial x2    COSMETIC SURGERY      Bilateral breast implants    HYSTERECTOMY      ICD GENERATOR REPLACEMENT N/A 3/30/2023    Procedure: ICD DC generator change, possible lead revision;  Surgeon: Paul Polo DO;  Location: [facility] INVASIVE LOCATION;  Service: Cardiology;  Laterality: N/A;    TONSILLECTOMY       Social History     Socioeconomic History    Marital status:    Tobacco Use    Smoking status: Never    Smokeless tobacco: Never   Vaping Use    Vaping Use: Never used   Substance and Sexual Activity    Alcohol use: No    Drug use: Never    Sexual activity: Defer     Family History   Problem Relation Age of Onset    Heart attack Father        H&P ROS reviewed and pertinent CV ROS as noted in HPI.    Cardiac: Patient has sick sinus syndrome has had previous pacemaker insertion had a pulse generator change in March 2023.  Has CAD with previous stenting of the right coronary artery.  Complains of chest pain consistent with unstable angina and then yesterday had 3 severe episodes of with nausea jaw pain shortness of breath  Respiratory: Complains of dyspnea which is progressive.  She does not wheeze  Lower Extremities: Complains of numbness in the anterior part of her legs.  No claudication  GI: Had nausea and vomiting yesterday  Neuro: No history of stroke TIA or neurologic event  Hematology: No bleeding diathesis ecchymosis or petechia.  She is anticoagulated  Renal: No CKD  Musculoskeletal: No complaints of joint pain  Endocrine: No diabetes or hypothyroidism  Constitutional: No fever chills or weight loss  Psych: No depression or suicidal ideation      Physical Exam: General very pleasant overweight female in bed at a 45 degree angle not dyspneic not tachypneic current systolic blood pressure 132       HEENT: No JVP or bruits.  No icterus       Respiratory: Equal bilateral symmetrical expansion auscultation  bilaterally       Cardiovascular: Regular rate and rhythm without murmur gallop or click and no edema palpation with 2+ DP pulses bilaterally       GI: Soft flat and obese       Lower Extremities: No lesions       Neuro: Facial expressions are symmetrical moves all 4 extremities       Skin: Warm and dry no edema palpation       Psych: Pleasant affect oriented x 3    Results Review: EKG shows paced rhythm and anterior lateral ST and T abnormalities.  Troponin is 40.  GFR is 77.4 hemoglobin is 11.2.  Chest x-ray is negative for acute process           Vital Sign Min/Max for last 24 hours  Temp  Min: 97.9 °F (36.6 °C)  Max: 98.7 °F (37.1 °C)   BP  Min: 119/76  Max: 157/88   Pulse  Min: 69  Max: 89   Resp  Min: 18  Max: 18   SpO2  Min: 86 %  Max: 99 %   No data recorded    No intake or output data in the 24 hours ending 12/06/23 0836          Current Facility-Administered Medications:     acetaminophen (TYLENOL) tablet 650 mg, 650 mg, Oral, Q4H PRN, 650 mg at 12/06/23 0627 **OR** acetaminophen (TYLENOL) 160 MG/5ML oral solution 650 mg, 650 mg, Oral, Q4H PRN **OR** acetaminophen (TYLENOL) suppository 650 mg, 650 mg, Rectal, Q4H PRN, Staci Zavaleta APRN    aspirin chewable tablet 324 mg, 324 mg, Oral, Once, Jared Malone, DO    aspirin EC tablet 81 mg, 81 mg, Oral, Daily, Staci Zavaleta APRN    sennosides-docusate (PERICOLACE) 8.6-50 MG per tablet 2 tablet, 2 tablet, Oral, BID **AND** polyethylene glycol (MIRALAX) packet 17 g, 17 g, Oral, Daily PRN **AND** bisacodyl (DULCOLAX) EC tablet 5 mg, 5 mg, Oral, Daily PRN **AND** bisacodyl (DULCOLAX) suppository 10 mg, 10 mg, Rectal, Daily PRN, Staci Zavaleta APRN    furosemide (LASIX) tablet 80 mg, 80 mg, Oral, Daily, Staci Zavaleta APRN    heparin 29713 units/250 mL (100 units/mL) in 0.45 % NaCl infusion, 12 Units/kg/hr, Intravenous, Titrated, Debbie Guzman MD, Last Rate: 9.16 mL/hr at 12/06/23 0616, 12 Units/kg/hr at 12/06/23 0616    levothyroxine  (SYNTHROID, LEVOTHROID) tablet 175 mcg, 175 mcg, Oral, Q AM, Staci Zavaleta, APRN, 175 mcg at 12/06/23 0610    lisinopril (PRINIVIL,ZESTRIL) tablet 20 mg, 20 mg, Oral, Daily, Staci Zavaleta, APRN    Morphine sulfate (PF) injection 4 mg, 4 mg, Intravenous, Q30 Min PRN, Jared Malone,     multivitamin with minerals 1 tablet, 1 tablet, Oral, Daily, Staci Zavaleta APRN    ondansetron (ZOFRAN) injection 4 mg, 4 mg, Intravenous, Q30 Min PRN, Jared Malone,     oxyCODONE-acetaminophen (PERCOCET)  MG per tablet 1 tablet, 1 tablet, Oral, Q8H PRN, Staci Zavaleta APRN, 1 tablet at 12/06/23 0419    pantoprazole (PROTONIX) EC tablet 40 mg, 40 mg, Oral, Q AM, Staci Zavaleta APRN, 40 mg at 12/06/23 0610    Pharmacy to Dose Heparin, , Does not apply, Continuous PRN, Debbie Guzman MD    sodium chloride 0.9 % flush 10 mL, 10 mL, Intravenous, PRN, Jared Malone,     sodium chloride 0.9 % flush 10 mL, 10 mL, Intravenous, Q12H, Staci Zavaleta, APRN    sodium chloride 0.9 % flush 10 mL, 10 mL, Intravenous, PRN, Staci Zavaleta R, APRN    sodium chloride 0.9 % infusion 40 mL, 40 mL, Intravenous, PRN, Staci Zavaleta R, APRN    sodium chloride 0.9 % infusion, 50 mL/hr, Intravenous, Continuous, Staci Zavaleta APRN, Last Rate: 50 mL/hr at 12/06/23 0419, 50 mL/hr at 12/06/23 0419    Lab Review:   Results from last 7 days   Lab Units 12/06/23 0435 12/05/23 2355   WBC 10*3/mm3 4.56 4.70   HEMOGLOBIN g/dL 11.6* 12.0   PLATELETS 10*3/mm3 224 286     Results from last 7 days   Lab Units 12/06/23  0435 12/05/23  2355   SODIUM mmol/L 141 142   POTASSIUM mmol/L 3.9 3.9   CO2 mmol/L 25.0 26.0   BUN mg/dL 18 17   CREATININE mg/dL 0.80 0.89   MAGNESIUM mg/dL 2.1  --    GLUCOSE mg/dL 115* 114*     Estimated Creatinine Clearance: 58 mL/min (by C-G formula based on SCr of 0.8 mg/dL).    Results from last 7 days   Lab Units 12/06/23  0435   HEMOGLOBIN A1C % 5.40     .lipd  Lab Results    Component Value Date    TRIG 778 (H) 12/06/2023    HDL 39 (L) 12/06/2023    AST 17 12/05/2023    ALT <5 12/05/2023       Radiology Reports:  Imaging Results (Last 72 Hours)       Procedure Component Value Units Date/Time    XR Chest 1 View [[id number]] Collected: 12/06/23 0023     Updated: 12/06/23 0027    Narrative:      XR CHEST 1 VW    Date of Exam: 12/6/2023 12:21 AM EST    Indication: Chest Pain Triage Protocol    Comparison: Chest radiograph dated September 23, 2015    Findings:  There is a left-sided dual-lead pacemaker device in place with leads over the right atrium and right ventricle. The heart is enlarged. The pulmonary vascular markings are normal. The lungs are clear      Impression:      Impression:  No active disease      Electronically Signed: Benny Jerome MD    12/6/2023 12:24 AM EST    Workstation ID: [id number]            Assessment/Plan: This patient with known CAD and prior catheter-based intervention presents complaining of chest pain with radiation to the jaws and associated severe shortness of breath and nausea and vomiting.  The patient has a history of coronary artery disease including 5 previous catheter-based interventions.  For months now she has been experiencing significant chest discomfort which is progressive in terms of frequency duration and intensity.  Her high-sensitivity troponins are elevated at 40.  Her findings are consistent with non-STEMI and I recommend cardiac cath which will be performed today with Dr. Kelley  Her last dose of Eliquis was yesterday morning  #2 history of A-fib-she is complain of occasional fluttering but no tierra palpitations.  Her last pacemaker check showed less than 1% A-fib      German Little MD  12/06/23  08:36 EST

## 2023-12-06 NOTE — CASE MANAGEMENT/SOCIAL WORK
"Discharge Planning Assessment  [facility]     Patient Name: Ludy Stock  MRN: [id number]  Today's Date: 12/6/2023    Admit Date: 12/5/2023    Plan: CM Eval   Discharge Needs Assessment       Row Name 12/06/23 1032       Living Environment    People in Home spouse    Current Living Arrangements home    Potentially Unsafe Housing Conditions none    Primary Care Provided by self    Provides Primary Care For no one    Family Caregiver if Needed spouse    Quality of Family Relationships involved;helpful    Able to Return to Prior Arrangements yes       Resource/Environmental Concerns    Resource/Environmental Concerns none       Transition Planning    Patient/Family Anticipates Transition to home with family;home    Patient/Family Anticipated Services at Transition        Discharge Needs Assessment    Readmission Within the Last 30 Days no previous admission in last 30 days    Equipment Currently Used at Home none    Concerns to be Addressed discharge planning    Anticipated Changes Related to Illness none    Equipment Needed After Discharge none                   Discharge Plan       Row Name 12/06/23 1033       Plan    Plan CM Eval    Plan Comments Confirmed patient lives with her spouse in [facility]. She is independent of ADLs and does not use any DME at baseline. Admitted with chest pain and hard cardiology consult this am. Per Cardiology note this AM: \" I recommend cardiac cath which will be performed today with Dr. Kelley.\" Goal is to return home upon discharge- CM will continue to follow- priscilla [phone]    Final Discharge Disposition Code 01 - home or self-care                  Continued Care and Services - Admitted Since 12/5/2023    Coordination has not been started for this encounter.       Expected Discharge Date and Time       Expected Discharge Date Expected Discharge Time    Dec 7, 2023            Demographic Summary       Row Name 12/06/23 1032       General Information    Admission " Type observation    Referral Source admission list    Reason for Consult discharge planning    Preferred Language English       Contact Information    Permission Granted to Share Info With                    Functional Status       Row Name 12/06/23 1032       Functional Status    Usual Activity Tolerance good    Current Activity Tolerance moderate       Functional Status, IADL    Medications independent    Meal Preparation independent    Housekeeping independent    Laundry independent    Shopping independent                   Psychosocial    No documentation.                  Abuse/Neglect    No documentation.                  Legal    No documentation.                  Substance Abuse    No documentation.                  Patient Forms    No documentation.                     Maame Mayberry RN

## 2023-12-07 ENCOUNTER — APPOINTMENT (OUTPATIENT)
Dept: CARDIOLOGY | Facility: HOSPITAL | Age: 73
DRG: 321 | End: 2023-12-07
Payer: MEDICARE

## 2023-12-07 LAB
ANION GAP SERPL CALCULATED.3IONS-SCNC: 8 MMOL/L (ref 5–15)
ASCENDING AORTA: 3.3 CM
BASOPHILS # BLD AUTO: 0.05 10*3/MM3 (ref 0–0.2)
BASOPHILS NFR BLD AUTO: 0.8 % (ref 0–1.5)
BH CV ECHO MEAS - AO MAX PG: 5.8 MMHG
BH CV ECHO MEAS - AO MEAN PG: 3 MMHG
BH CV ECHO MEAS - AO ROOT DIAM: 3.1 CM
BH CV ECHO MEAS - AO V2 MAX: 120 CM/SEC
BH CV ECHO MEAS - AO V2 VTI: 23.1 CM
BH CV ECHO MEAS - AVA(I,D): 1.91 CM2
BH CV ECHO MEAS - EDV(CUBED): 91.1 ML
BH CV ECHO MEAS - EDV(MOD-SP2): 103 ML
BH CV ECHO MEAS - EDV(MOD-SP4): 114 ML
BH CV ECHO MEAS - EF(MOD-BP): 61.7 %
BH CV ECHO MEAS - EF(MOD-SP2): 61.2 %
BH CV ECHO MEAS - EF(MOD-SP4): 62.7 %
BH CV ECHO MEAS - ESV(CUBED): 35.9 ML
BH CV ECHO MEAS - ESV(MOD-SP2): 40 ML
BH CV ECHO MEAS - ESV(MOD-SP4): 42.5 ML
BH CV ECHO MEAS - FS: 26.7 %
BH CV ECHO MEAS - IVS/LVPW: 1 CM
BH CV ECHO MEAS - IVSD: 0.9 CM
BH CV ECHO MEAS - LA DIMENSION: 4.1 CM
BH CV ECHO MEAS - LAT PEAK E' VEL: 11.7 CM/SEC
BH CV ECHO MEAS - LV MASS(C)D: 132.8 GRAMS
BH CV ECHO MEAS - LV MAX PG: 3.2 MMHG
BH CV ECHO MEAS - LV MEAN PG: 1 MMHG
BH CV ECHO MEAS - LV V1 MAX: 89.6 CM/SEC
BH CV ECHO MEAS - LV V1 VTI: 15.6 CM
BH CV ECHO MEAS - LVIDD: 4.5 CM
BH CV ECHO MEAS - LVIDS: 3.3 CM
BH CV ECHO MEAS - LVOT AREA: 2.8 CM2
BH CV ECHO MEAS - LVOT DIAM: 1.9 CM
BH CV ECHO MEAS - LVPWD: 0.9 CM
BH CV ECHO MEAS - MED PEAK E' VEL: 9.8 CM/SEC
BH CV ECHO MEAS - MV A MAX VEL: 101 CM/SEC
BH CV ECHO MEAS - MV DEC SLOPE: 411 CM/SEC2
BH CV ECHO MEAS - MV DEC TIME: 0.31 SEC
BH CV ECHO MEAS - MV E MAX VEL: 55.5 CM/SEC
BH CV ECHO MEAS - MV E/A: 0.55
BH CV ECHO MEAS - MV MAX PG: 4.8 MMHG
BH CV ECHO MEAS - MV MEAN PG: 2 MMHG
BH CV ECHO MEAS - MV P1/2T: 62.2 MSEC
BH CV ECHO MEAS - MV V2 VTI: 26.8 CM
BH CV ECHO MEAS - MVA(P1/2T): 3.5 CM2
BH CV ECHO MEAS - MVA(VTI): 1.65 CM2
BH CV ECHO MEAS - PA ACC TIME: 0.09 SEC
BH CV ECHO MEAS - SV(LVOT): 44.2 ML
BH CV ECHO MEAS - SV(MOD-SP2): 63 ML
BH CV ECHO MEAS - SV(MOD-SP4): 71.5 ML
BH CV ECHO MEAS - TR MAX PG: 16.8 MMHG
BH CV ECHO MEAS - TR MAX VEL: 205 CM/SEC
BH CV ECHO MEASUREMENTS AVERAGE E/E' RATIO: 5.16
BH CV VAS BP LEFT ARM: NORMAL MMHG
BH CV XLRA - RV BASE: 3.3 CM
BH CV XLRA - RV LENGTH: 6.4 CM
BH CV XLRA - RV MID: 2.8 CM
BUN SERPL-MCNC: 19 MG/DL (ref 8–23)
BUN/CREAT SERPL: 17 (ref 7–25)
CALCIUM SPEC-SCNC: 9.9 MG/DL (ref 8.6–10.5)
CHLORIDE SERPL-SCNC: 102 MMOL/L (ref 98–107)
CO2 SERPL-SCNC: 28 MMOL/L (ref 22–29)
CREAT SERPL-MCNC: 1.12 MG/DL (ref 0.57–1)
DEPRECATED RDW RBC AUTO: 45.3 FL (ref 37–54)
EGFRCR SERPLBLD CKD-EPI 2021: 51.7 ML/MIN/1.73
EOSINOPHIL # BLD AUTO: 0.21 10*3/MM3 (ref 0–0.4)
EOSINOPHIL NFR BLD AUTO: 3.2 % (ref 0.3–6.2)
ERYTHROCYTE [DISTWIDTH] IN BLOOD BY AUTOMATED COUNT: 13.3 % (ref 12.3–15.4)
GLUCOSE SERPL-MCNC: 97 MG/DL (ref 65–99)
HCT VFR BLD AUTO: 40.4 % (ref 34–46.6)
HGB BLD-MCNC: 12.9 G/DL (ref 12–15.9)
IMM GRANULOCYTES # BLD AUTO: 0.02 10*3/MM3 (ref 0–0.05)
IMM GRANULOCYTES NFR BLD AUTO: 0.3 % (ref 0–0.5)
LEFT ATRIUM VOLUME INDEX: 47.8 ML/M2
LV EF 2D ECHO EST: 60 %
LYMPHOCYTES # BLD AUTO: 1.15 10*3/MM3 (ref 0.7–3.1)
LYMPHOCYTES NFR BLD AUTO: 17.7 % (ref 19.6–45.3)
MCH RBC QN AUTO: 29.5 PG (ref 26.6–33)
MCHC RBC AUTO-ENTMCNC: 31.9 G/DL (ref 31.5–35.7)
MCV RBC AUTO: 92.4 FL (ref 79–97)
MONOCYTES # BLD AUTO: 0.55 10*3/MM3 (ref 0.1–0.9)
MONOCYTES NFR BLD AUTO: 8.4 % (ref 5–12)
NEUTROPHILS NFR BLD AUTO: 4.53 10*3/MM3 (ref 1.7–7)
NEUTROPHILS NFR BLD AUTO: 69.6 % (ref 42.7–76)
NRBC BLD AUTO-RTO: 0 /100 WBC (ref 0–0.2)
PLATELET # BLD AUTO: 234 10*3/MM3 (ref 140–450)
PMV BLD AUTO: 9.5 FL (ref 6–12)
POTASSIUM SERPL-SCNC: 4.5 MMOL/L (ref 3.5–5.2)
RBC # BLD AUTO: 4.37 10*6/MM3 (ref 3.77–5.28)
SODIUM SERPL-SCNC: 138 MMOL/L (ref 136–145)
WBC NRBC COR # BLD AUTO: 6.51 10*3/MM3 (ref 3.4–10.8)

## 2023-12-07 PROCEDURE — 93005 ELECTROCARDIOGRAM TRACING: CPT | Performed by: INTERNAL MEDICINE

## 2023-12-07 PROCEDURE — 97161 PT EVAL LOW COMPLEX 20 MIN: CPT

## 2023-12-07 PROCEDURE — 85025 COMPLETE CBC W/AUTO DIFF WBC: CPT | Performed by: INTERNAL MEDICINE

## 2023-12-07 PROCEDURE — 80048 BASIC METABOLIC PNL TOTAL CA: CPT | Performed by: INTERNAL MEDICINE

## 2023-12-07 PROCEDURE — 25010000002 FUROSEMIDE PER 20 MG: Performed by: NURSE PRACTITIONER

## 2023-12-07 PROCEDURE — 93010 ELECTROCARDIOGRAM REPORT: CPT | Performed by: INTERNAL MEDICINE

## 2023-12-07 PROCEDURE — 25010000002 MORPHINE PER 10 MG: Performed by: INTERNAL MEDICINE

## 2023-12-07 PROCEDURE — 99232 SBSQ HOSP IP/OBS MODERATE 35: CPT | Performed by: NURSE PRACTITIONER

## 2023-12-07 PROCEDURE — 93306 TTE W/DOPPLER COMPLETE: CPT | Performed by: INTERNAL MEDICINE

## 2023-12-07 PROCEDURE — 93306 TTE W/DOPPLER COMPLETE: CPT

## 2023-12-07 PROCEDURE — 99232 SBSQ HOSP IP/OBS MODERATE 35: CPT | Performed by: STUDENT IN AN ORGANIZED HEALTH CARE EDUCATION/TRAINING PROGRAM

## 2023-12-07 RX ORDER — METOPROLOL SUCCINATE 25 MG/1
12.5 TABLET, EXTENDED RELEASE ORAL
Status: DISCONTINUED | OUTPATIENT
Start: 2023-12-07 | End: 2023-12-08 | Stop reason: HOSPADM

## 2023-12-07 RX ORDER — FUROSEMIDE 10 MG/ML
40 INJECTION INTRAMUSCULAR; INTRAVENOUS ONCE
Status: COMPLETED | OUTPATIENT
Start: 2023-12-07 | End: 2023-12-07

## 2023-12-07 RX ORDER — OXYBUTYNIN CHLORIDE 10 MG/1
10 TABLET, EXTENDED RELEASE ORAL DAILY
COMMUNITY

## 2023-12-07 RX ORDER — OXYBUTYNIN CHLORIDE 5 MG/1
2.5 TABLET ORAL 3 TIMES DAILY PRN
Status: DISCONTINUED | OUTPATIENT
Start: 2023-12-07 | End: 2023-12-08 | Stop reason: HOSPADM

## 2023-12-07 RX ORDER — OXYCODONE AND ACETAMINOPHEN 10; 325 MG/1; MG/1
1 TABLET ORAL EVERY 6 HOURS PRN
COMMUNITY

## 2023-12-07 RX ADMIN — ACETAMINOPHEN 650 MG: 325 TABLET ORAL at 09:30

## 2023-12-07 RX ADMIN — Medication 10 ML: at 09:23

## 2023-12-07 RX ADMIN — FUROSEMIDE 40 MG: 10 INJECTION, SOLUTION INTRAMUSCULAR; INTRAVENOUS at 09:23

## 2023-12-07 RX ADMIN — Medication 10 ML: at 21:47

## 2023-12-07 RX ADMIN — MORPHINE SULFATE 4 MG: 4 INJECTION, SOLUTION INTRAMUSCULAR; INTRAVENOUS at 04:37

## 2023-12-07 RX ADMIN — APIXABAN 5 MG: 5 TABLET, FILM COATED ORAL at 21:47

## 2023-12-07 RX ADMIN — Medication 1 TABLET: at 09:22

## 2023-12-07 RX ADMIN — PANTOPRAZOLE SODIUM 40 MG: 40 TABLET, DELAYED RELEASE ORAL at 06:36

## 2023-12-07 RX ADMIN — OXYBUTYNIN CHLORIDE 2.5 MG: 5 TABLET ORAL at 12:56

## 2023-12-07 RX ADMIN — FUROSEMIDE 80 MG: 40 TABLET ORAL at 09:22

## 2023-12-07 RX ADMIN — MORPHINE SULFATE 4 MG: 4 INJECTION, SOLUTION INTRAMUSCULAR; INTRAVENOUS at 09:30

## 2023-12-07 RX ADMIN — OXYCODONE HYDROCHLORIDE AND ACETAMINOPHEN 1 TABLET: 10; 325 TABLET ORAL at 21:47

## 2023-12-07 RX ADMIN — LEVOTHYROXINE SODIUM 175 MCG: 0.17 TABLET ORAL at 06:36

## 2023-12-07 RX ADMIN — ROSUVASTATIN 20 MG: 20 TABLET, FILM COATED ORAL at 21:46

## 2023-12-07 RX ADMIN — OXYCODONE HYDROCHLORIDE AND ACETAMINOPHEN 1 TABLET: 10; 325 TABLET ORAL at 06:36

## 2023-12-07 RX ADMIN — CLOPIDOGREL BISULFATE 75 MG: 75 TABLET ORAL at 09:23

## 2023-12-07 RX ADMIN — OXYCODONE HYDROCHLORIDE AND ACETAMINOPHEN 1 TABLET: 10; 325 TABLET ORAL at 13:03

## 2023-12-07 RX ADMIN — ASPIRIN 81 MG: 81 TABLET, COATED ORAL at 09:22

## 2023-12-07 RX ADMIN — METOPROLOL SUCCINATE 12.5 MG: 25 TABLET, EXTENDED RELEASE ORAL at 12:55

## 2023-12-07 RX ADMIN — LISINOPRIL 20 MG: 20 TABLET ORAL at 09:22

## 2023-12-07 NOTE — PROGRESS NOTES
"  [facility]   Inpatient Progress Note       LOS: 1 day   Patient Care Team:  Roger Hopper MD as PCP - General (Family Medicine)  Provider, No Known as PCP - Family Medicine  Jake Rachel MD as Consulting Physician (Cardiology)    Chief Complaint:  follow-up for CAD    Subjective     Interval History:     Patient in bed. Notes HA, orthopnea and abdominal pain. No further chest pain. Has IVF infusing. S/p intervention to RCA as noted below.     Review of Systems:   Pertinent positives noted in history, exam, and assessment. Otherwise reviewed and negative.      Objective     Vitals:  Blood pressure 102/63, pulse 69, temperature 98.2 °F (36.8 °C), temperature source Oral, resp. rate 18, height 152.4 cm (60\"), weight 74.3 kg (163 lb 12.8 oz), SpO2 95%.   No intake or output data in the 24 hours ending 12/07/23 0832  Vitals reviewed.   Constitutional:       Appearance: Well-developed and not in distress.   Neck:      Vascular: No JVD.      Trachea: No tracheal deviation.   Pulmonary:      Effort: Pulmonary effort is normal.      Comments: Faint basilar rales  Cardiovascular:      Normal rate. Regular rhythm.      Murmurs: There is no murmur.      Comments: Right radial access site benign  Pulses:     Intact distal pulses.   Edema:     Peripheral edema absent.   Musculoskeletal:         General: No deformity. Skin:     General: Skin is warm and dry.   Neurological:      Mental Status: Alert and oriented to person, place, and time.            Results Review:     I reviewed the patient's new clinical results.    Results from last 7 days   Lab Units 12/07/23  0445   WBC 10*3/mm3 6.51   HEMOGLOBIN g/dL 12.9   HEMATOCRIT % 40.4   PLATELETS 10*3/mm3 234     Results from last 7 days   Lab Units 12/07/23  0445 12/06/23  0435 12/05/23  2355   SODIUM mmol/L 138   < > 142   POTASSIUM mmol/L 4.5   < > 3.9   CHLORIDE mmol/L 102   < > 107   CO2 mmol/L 28.0   < > 26.0   BUN mg/dL 19   < > 17 "   CREATININE mg/dL 1.12*   < > 0.89   CALCIUM mg/dL 9.9   < > 8.9   BILIRUBIN mg/dL  --   --  0.2   ALK PHOS U/L  --   --  79   ALT (SGPT) U/L  --   --  <5   AST (SGOT) U/L  --   --  17   GLUCOSE mg/dL 97   < > 114*    < > = values in this interval not displayed.     Results from last 7 days   Lab Units 12/07/23  0445   SODIUM mmol/L 138   POTASSIUM mmol/L 4.5   CHLORIDE mmol/L 102   CO2 mmol/L 28.0   BUN mg/dL 19   CREATININE mg/dL 1.12*   GLUCOSE mg/dL 97   CALCIUM mg/dL 9.9     Results from last 7 days   Lab Units 12/05/23  2355   INR  0.91     Lab Results   Lab Value Date/Time    TROPONINT 40 (H) 12/06/2023 0300    TROPONINT 24 (H) 12/06/2023 0058    TROPONINT <0.010 08/20/2022 0945         Results from last 7 days   Lab Units 12/06/23  0435   CHOLESTEROL mg/dL 380*   TRIGLYCERIDES mg/dL 778*   HDL CHOL mg/dL 39*   LDL CHOL mg/dL 174*     Results from last 7 days   Lab Units 12/05/23  2355   PROBNP pg/mL 503.5     Results from last 7 days   Lab Units 12/06/23  0300 12/06/23  0058   HSTROP T ng/L 40* 24*     LHC:    90% distal RCA ISR status post OCT guided PCI with a 3.0 x 28 mm Xience, postdilated with a 4.0 proximal and a 3.5 mid to distal.    LAD 20 to 30% proximal, first diagonal 20-30% ostial, proximal circumflex 20-30%.  Proximal RCA 20 to 30% ISR.    LV pressures (S/D/E) : 109/8/17 mmHg    Tele:  SR    Assessment:      Chest pain    Peptic ulcer disease    Crohn's disease, small and large intestine    SSS (sick sinus syndrome)    Coronary artery disease involving native coronary artery of native heart with other form of angina pectoris    PAF (paroxysmal atrial fibrillation)    Diastolic CHF, chronic    NSTEMI, initial episode of care    NSTEMI (non-ST elevated myocardial infarction)      CAD/NSTEMI  HTN  Dyslipidemia   PAF on Eliquis    Plan:  ECHO today  Lasix 40 mg IV today.  DC IVF's   Resume Eliquis  Continue other cardiac medications.   BMP in AM.   Pending response possible DC tomorrow.     Elvi  ESTHER Meehan   Dictated utilizing Dragon dictation

## 2023-12-07 NOTE — PLAN OF CARE
Goal Outcome Evaluation:  Plan of Care Reviewed With: patient        Progress: no change  Outcome Evaluation: Patient presents with weakness, poor activity tolerance, balance impairments, and pain affecting functional mobility. She took a few side steps towards HOB but demonstrated unsteadiness- activity limited by pain. Skilled IP PT services warranted to address deficits. Recommend IRF at d/c to support return to independence.      Anticipated Discharge Disposition (PT): inpatient rehabilitation facility

## 2023-12-07 NOTE — PROGRESS NOTES
Pt. Qualifies for Phase II Cardiac Rehab. Staff discussed benefits of exercise, program protocol, and educational material provided. Teach back verified.  Permission granted from patient for staff to fax referral information to outlying program at this time.  Staff faxed referral info to [facility].

## 2023-12-07 NOTE — PROGRESS NOTES
[facility]  PROGRESS NOTE    Patient Name: Ludy Stock  : 1950  MRN: [id number]    Date of Admission: 2023  Primary Care Physician: Roger Hopper MD    Subjective   Subjective     CC:  Chest pain    HPI:  Some of her CP is better but she does endorse bladder discomfort and feeling week      Objective   Objective     Vital Signs:   Temp:  [97.7 °F (36.5 °C)-98.4 °F (36.9 °C)] 98.2 °F (36.8 °C)  Heart Rate:  [69-72] 72  Resp:  [16-18] 18  BP: (102-174)/(60-84) 126/60  Flow (L/min):  [1-2] 2     Physical Exam:  Constitutional: No acute distress, awake, alert  HENT: NCAT, mucous membranes moist  Respiratory: Clear to auscultation bilaterally, respiratory effort normal   Cardiovascular: RRR, no murmurs, rubs, or gallops  Gastrointestinal: Positive bowel sounds, soft, nontender, nondistended  Musculoskeletal: No bilateral ankle edema  Psychiatric: Appropriate affect, cooperative  Neurologic: Oriented x 3, strength symmetric in all extremities, Cranial Nerves grossly intact to confrontation, speech clear  Skin: No rashes    Results Reviewed:  LAB RESULTS:      Lab 23  1147 23  2355   WBC 6.51  --  4.56 4.70   HEMOGLOBIN 12.9  --  11.6* 12.0   HEMATOCRIT 40.4  --  35.6 36.5   PLATELETS 234  --  224 286   NEUTROS ABS 4.53  --  1.98 2.15   IMMATURE GRANS (ABS) 0.02  --  0.02 0.02   LYMPHS ABS 1.15  --  1.87 1.76   MONOS ABS 0.55  --  0.42 0.46   EOS ABS 0.21  --  0.21 0.25   MCV 92.4  --  91.3 93.8   PROTIME  --   --   --  12.3   APTT  --  57.6* 29.3*  --    HEPARIN ANTI-XA  --   --  0.25*  --    D DIMER QUANT  --   --   --  0.50         Lab 23  0445 23  0435 23  2355   SODIUM 138 141 142   POTASSIUM 4.5 3.9 3.9   CHLORIDE 102 105 107   CO2 28.0 25.0 26.0   ANION GAP 8.0 11.0 9.0   BUN 19 18 17   CREATININE 1.12* 0.80 0.89   EGFR 51.7* 77.4 68.1   GLUCOSE 97 115* 114*   CALCIUM 9.9 9.3 8.9    MAGNESIUM  --  2.1  --    HEMOGLOBIN A1C  --  5.40  --          Lab 12/05/23  2355   TOTAL PROTEIN 5.5*   ALBUMIN 3.7   GLOBULIN 1.8   ALT (SGPT) <5   AST (SGOT) 17   BILIRUBIN 0.2   ALK PHOS 79   LIPASE 16         Lab 12/06/23  0300 12/06/23  0058 12/05/23  2355   PROBNP  --   --  503.5   HSTROP T 40* 24*  --    PROTIME  --   --  12.3   INR  --   --  0.91         Lab 12/06/23  0435   CHOLESTEROL 380*   LDL CHOL 174*   HDL CHOL 39*   TRIGLYCERIDES 778*             Brief Urine Lab Results       None            Microbiology Results Abnormal       None            Adult Transthoracic Echo Complete w/ Color, Spectral and Contrast if necessary per protocol    Result Date: 12/7/2023    Estimated left ventricular EF = 60%   No hemodynamically significant valvular heart disease     Cardiac Catheterization/Vascular Study    Result Date: 12/6/2023    90% distal RCA ISR status post OCT guided PCI with a 3.0 x 28 mm Xience, postdilated with a 4.0 proximal and a 3.5 mid to distal.   LAD 20 to 30% proximal, first diagonal 20-30% ostial, proximal circumflex 20-30%.  Proximal RCA 20 to 30% ISR.   LV pressures (S/D/E) : 109/8/17 mmHg Sent home on Eliquis and clopidogrel for 12 months then switch clopidogrel back to aspirin     Optical Coherence Tomography    Result Date: 12/6/2023    90% distal RCA ISR status post OCT guided PCI with a 3.0 x 28 mm Xience, postdilated with a 4.0 proximal and a 3.5 mid to distal.   LAD 20 to 30% proximal, first diagonal 20-30% ostial, proximal circumflex 20-30%.  Proximal RCA 20 to 30% ISR.   LV pressures (S/D/E) : 109/8/17 mmHg Sent home on Eliquis and clopidogrel for 12 months then switch clopidogrel back to aspirin     XR Chest 1 View    Result Date: 12/6/2023  XR CHEST 1 VW Date of Exam: 12/6/2023 12:21 AM EST Indication: Chest Pain Triage Protocol Comparison: Chest radiograph dated September 23, 2015 Findings: There is a left-sided dual-lead pacemaker device in place with leads over the right  atrium and right ventricle. The heart is enlarged. The pulmonary vascular markings are normal. The lungs are clear     Impression: Impression: No active disease Electronically Signed: Benny Jerome MD  12/6/2023 12:24 AM EST  Workstation ID: [id number]     Results for orders placed during the hospital encounter of 12/05/23    Adult Transthoracic Echo Complete w/ Color, Spectral and Contrast if necessary per protocol    Interpretation Summary    Estimated left ventricular EF = 60%    No hemodynamically significant valvular heart disease      Current medications:  Scheduled Meds:apixaban, 5 mg, Oral, Q12H  aspirin, 324 mg, Oral, Once  aspirin, 81 mg, Oral, Daily  clopidogrel, 75 mg, Oral, Daily  furosemide, 80 mg, Oral, Daily  levothyroxine, 175 mcg, Oral, Q AM  lisinopril, 20 mg, Oral, Daily  metoprolol succinate XL, 12.5 mg, Oral, Q24H  multivitamin with minerals, 1 tablet, Oral, Daily  pantoprazole, 40 mg, Oral, Q AM  pharmacy consult - MTM, , Does not apply, Daily  rosuvastatin, 20 mg, Oral, Nightly  senna-docusate sodium, 2 tablet, Oral, BID  sodium chloride, 10 mL, Intravenous, Q12H      Continuous Infusions:     PRN Meds:.  acetaminophen **OR** acetaminophen **OR** acetaminophen    senna-docusate sodium **AND** polyethylene glycol **AND** bisacodyl **AND** bisacodyl    Morphine    nitroglycerin    ondansetron    oxybutynin    oxyCODONE-acetaminophen    sodium chloride    sodium chloride    sodium chloride    Assessment & Plan   Assessment & Plan     Active Hospital Problems    Diagnosis  POA    **Chest pain [R07.9]  Yes    PAF (paroxysmal atrial fibrillation) [I48.0]  Yes    Diastolic CHF, chronic [I50.32]  Yes    NSTEMI (non-ST elevated myocardial infarction) [I21.4]  Yes    NSTEMI, initial episode of care [I21.4]  Unknown    Coronary artery disease involving native coronary artery of native heart with other form of angina pectoris [I25.118]  Yes    SSS (sick sinus syndrome) [I49.5]  Yes    Peptic ulcer disease  [K27.9]  Yes    Crohn's disease, small and large intestine [K50.80]  Yes      Resolved Hospital Problems   No resolved problems to display.        Brief Hospital Course to date:  Ludy Stock is a 73 y.o. female with PMH significant for CAD, PAF on Eliquis, DVT, HTN, hypothyroid disease, peptic ulcer disease, PTSD, pulmonary hypertension, SSS s/p PPM, SLE, who presented to the ED with complaint of chest pain.     Chest pain  CAD s/p multiple stents last in 2019 (in [place])  -s/p C on 12/6 with stent placedShe needs to remain on Eliquis and clopidogrel for 1 year.  - Discussed with cardiology given increased dyspnea plan for echo today with administration of Lasix IV.  Pending response may be able to discharge tomorrow    CKD  Bladder spasm  --monitor Cr w diuresis  --added oxybutynin prn spasm      PAF  SSS  -  in RCA.S/p PPM  - Currently rate controlled  - Continue Eliquis twice daily      Diastolic CHF  - Diuresis as above, echo pending.  Appreciate cardiology assistance  - Daily weight     Hypothyroid  - Continue levothyroxine     Peptic ulcer disease  - Continue PPI    Total time spent: Time Spent: Time Spent: 25 minutes  Time spent includes time reviewing chart, face-to-face time, counseling patient/family/caregiver, ordering medications/tests/procedures, communicating with other health care professionals, documenting clinical information in the electronic health record, and coordination of care.      Expected Discharge Location and Transportation: home  Expected Discharge   Expected Discharge Date: 12/7/2023; Expected Discharge Time:      DVT prophylaxis:  Medical DVT prophylaxis orders are present.     AM-PAC 6 Clicks Score (PT): 18 (12/07/23 3908)    CODE STATUS:   Code Status and Medical Interventions:   Ordered at: 12/06/23 0236     Code Status (Patient has no pulse and is not breathing):    CPR (Attempt to Resuscitate)     Medical Interventions (Patient has pulse or is breathing):    Full Support        Rocio Ragland MD  12/07/23

## 2023-12-07 NOTE — THERAPY EVALUATION
Patient Name: Ludy Stock  : 1950    MRN: [id number]                              Today's Date: 2023       Admit Date: 2023    Visit Dx:     ICD-10-CM ICD-9-CM   1. NSTEMI, initial episode of care  I21.4 410.71   2. Chest pain, unspecified type  R07.9 786.50   3. Coronary artery disease involving native heart with unstable angina pectoris, unspecified vessel or lesion type  I25.110 414.01     413.9     Patient Active Problem List   Diagnosis    Symptomatic bradycardia    Coronary artery disease    Pulmonary hypertension    Systemic lupus erythematosus    Fibromyalgia    Peptic ulcer disease    Crohn's disease, small and large intestine    DVT (deep venous thrombosis)    PTSD (post-traumatic stress disorder)    Parathyroid disease    SSS (sick sinus syndrome)    Pacemaker at end of battery life    Coronary artery disease involving native coronary artery of native heart with other form of angina pectoris    Chest pain    PAF (paroxysmal atrial fibrillation)    Diastolic CHF, chronic    NSTEMI, initial episode of care    NSTEMI (non-ST elevated myocardial infarction)     Past Medical History:   Diagnosis Date    Coronary artery disease     Crohn's disease, small and large intestine     DVT (deep venous thrombosis)     Fibromyalgia     Heart disease     Hypertension     Parathyroid disease     status post parathyroidectomy.    Peptic ulcer disease     PONV (postoperative nausea and vomiting)     PTSD (post-traumatic stress disorder)     Pulmonary hypertension     Symptomatic bradycardia     Systemic lupus erythematosus      Past Surgical History:   Procedure Laterality Date    BLADDER RESECTION LAPAROSCOPIC      BREAST LUMPECTOMY      BUNIONECTOMY      BILATERAL     CARPAL TUNNEL RELEASE Right     COLON RESECTION      Partial x2    COSMETIC SURGERY      Bilateral breast implants    HYSTERECTOMY      ICD GENERATOR REPLACEMENT N/A 3/30/2023    Procedure: ICD DC generator change, possible lead  revision;  Surgeon: Paul Polo DO;  Location:  [facility];  Service: Cardiology;  Laterality: N/A;    TONSILLECTOMY        General Information       Row Name 12/07/23 [facility]          Physical Therapy Time and Intention    Document Type evaluation  -NS     Mode of Treatment individual therapy;physical therapy  -NS       Row Name 12/07/23 [facility]          General Information    Patient Profile Reviewed yes  -NS     Prior Level of Function independent:;all household mobility;gait;transfer;bed mobility;ADL's  No AD use at baseline; reports chronic LBP  -NS     Existing Precautions/Restrictions fall;other (see comments)  heart cath site R radial artery  -NS     Barriers to Rehab medically complex  -NS       Row Name 12/07/23 [facility]          Living Environment    People in Home spouse;child(zachary), adult  -NS       Row Name 12/07/23 [facility]          Home Main Entrance    Number of Stairs, Main Entrance one  -NS     Stair Railings, Main Entrance none  -NS       Row Name 12/07/23 [facility]          Stairs Within Home, Primary    Number of Stairs, Within Home, Primary none  -NS       Row Name 12/07/23 [facility]          Cognition    Orientation Status (Cognition) oriented x 3  -NS       Row Name 12/07/23 0755          Safety Issues, Functional Mobility    Safety Issues Affecting Function (Mobility) insight into deficits/self-awareness;safety precaution awareness;safety precautions follow-through/compliance;sequencing abilities  -NS     Impairments Affecting Function (Mobility) balance;coordination;endurance/activity tolerance;strength;postural/trunk control;pain;motor planning  -NS               User Key  (r) = Recorded By, (t) = Taken By, (c) = Cosigned By      Initials Name Provider Type    Elvi Gonzalez PT Physical Therapist                   Mobility       Row Name 12/07/23 [facility]          Bed Mobility    Bed Mobility supine-sit;sit-supine  -NS     Supine-Sit [place] (Bed Mobility) standby assist  -NS      Sit-Supine [place] (Bed Mobility) standby assist  -NS     Assistive Device (Bed Mobility) bed rails;head of bed elevated  -NS     Comment, (Bed Mobility) Pt reported dizziness upon sitting EOB. VSS on 3L O2.  -NS       Row Name 12/07/23 0755          Transfers    Comment, (Transfers) Mild unsteadines upon standing.  -NS       Row Name 12/07/23 0755          Sit-Stand Transfer    Sit-Stand [place] (Transfers) contact guard  -NS       Row Name 12/07/23 0755          Gait/Stairs (Locomotion)    [place] Level (Gait) minimum assist (75% patient effort)  -NS     Assistive Device (Gait) other (see comments)  R HHA  -NS     Distance in Feet (Gait) 3  -NS     Deviations/Abnormal Patterns (Gait) weight shifting decreased  -NS     Bilateral Gait Deviations heel strike decreased  -NS     Comment, (Gait/Stairs) Pt took a few side steps towards the HOB with Min A for balance, demonstrating unsteadiness. She declined further ambulation due to headache, back pain, and abdominal pain. RN notified.  -NS               User Key  (r) = Recorded By, (t) = Taken By, (c) = Cosigned By      Initials Name Provider Type    Elvi Gonzalez PT Physical Therapist                   Obj/Interventions       Row Name 12/07/23 0755          Range of Motion Comprehensive    General Range of Motion bilateral lower extremity ROM WFL  -NS       [facility] Name 12/07/23 0755          Strength Comprehensive (MMT)    General Manual Muscle Testing (MMT) Assessment lower extremity strength deficits identified  -NS     Comment, General Manual Muscle Testing (MMT) Assessment B ankle DF: 4/5, B knee ext: 4/5, B hip flexion: 4-/5  -NS       Row Name 12/07/23 0755          Balance    Balance Assessment sitting static balance;sitting dynamic balance;standing static balance;standing dynamic balance  -NS     Static Sitting Balance standby assist  -NS     Dynamic Sitting Balance standby assist  -NS     Position, Sitting Balance unsupported;sitting edge of  bed  -NS     Dynamic Standing Balance contact guard;minimal assist  -NS     Position/Device Used, Standing Balance supported;other (see comments)  R HHA  -NS       Row Name 12/07/23 [facility]          Sensory Assessment (Somatosensory)    Sensory Assessment (Somatosensory) LE sensation intact  -NS               User Key  (r) = Recorded By, (t) = Taken By, (c) = Cosigned By      Initials Name Provider Type    Elvi Gonzalez PT Physical Therapist                   Goals/Plan       Row Name 12/07/23 [facility]          Bed Mobility Goal 1 (PT)    Activity/Assistive Device (Bed Mobility Goal 1, PT) supine to sit  -NS     [place] Level/Cues Needed (Bed Mobility Goal 1, PT) independent  -NS     Time Frame (Bed Mobility Goal 1, PT) long term goal (LTG);2 weeks  -NS       Row Name 12/07/23 [facility]          Transfer Goal 1 (PT)    Activity/Assistive Device (Transfer Goal 1, PT) sit-to-stand/stand-to-sit;bed-to-chair/chair-to-bed  -NS     [place] Level/Cues Needed (Transfer Goal 1, PT) standby assist  -NS     Time Frame (Transfer Goal 1, PT) long term goal (LTG);2 weeks  -NS       Row Name 12/07/23 [facility]          Gait Training Goal 1 (PT)    Activity/Assistive Device (Gait Training Goal 1, PT) gait (walking locomotion)  -NS     [place] Level (Gait Training Goal 1, PT) standby assist  -NS     Distance (Gait Training Goal 1, PT) 50  -NS     Time Frame (Gait Training Goal 1, PT) long term goal (LTG);2 weeks  -NS       Row Name 12/07/23 [facility]          Therapy Assessment/Plan (PT)    Planned Therapy Interventions (PT) balance training;bed mobility training;gait training;home exercise program;neuromuscular re-education;strengthening;patient/family education;transfer training  -NS               User Key  (r) = Recorded By, (t) = Taken By, (c) = Cosigned By      Initials Name Provider Type    Elvi Gonzalez PT Physical Therapist                   Clinical Impression       Row Name 12/07/23 [facility]          Pain    Pretreatment Pain  Rating 8/10  -NS     Posttreatment Pain Rating 8/10  -NS     Pain Location - Side/Orientation Bilateral  -NS     Pain Location lower  -NS     Pain Location - back;abdomen;head  -NS     Pre/Posttreatment Pain Comment RN notified, heated blankets applied to abdomen per pt request  -NS     Pain Intervention(s) Heat applied;Nursing Notified;Ambulation/increased activity  -NS       Row Name 12/07/23 0755          Plan of Care Review    Plan of Care Reviewed With patient  -NS     Progress no change  -NS     Outcome Evaluation Patient presents with weakness, poor activity tolerance, balance impairments, and pain affecting functional mobility. She took a few side steps towards HOB but demonstrated unsteadiness- activity limited by pain. Skilled IP PT services warranted to address deficits. Recommend IRF at d/c to support return to independence.  -NS       Row Name 12/07/23 0755          Therapy Assessment/Plan (PT)    Patient/Family Therapy Goals Statement (PT) return to PLOF  -NS     Rehab Potential (PT) good, to achieve stated therapy goals  -NS     Criteria for Skilled Interventions Met (PT) yes;meets criteria  -NS     Therapy Frequency (PT) daily  -NS       Row Name 12/07/23 0755          Vital Signs    Pre Systolic BP Rehab 102  -NS     Pre Treatment Diastolic BP 63  -NS     Intra Systolic BP Rehab 120  -NS     Intra Treatment Diastolic BP 77  -NS     Pretreatment Heart Rate (beats/min) 71  -NS     Posttreatment Heart Rate (beats/min) 75  -NS     Pre SpO2 (%) 97  -NS     O2 Delivery Pre Treatment nasal cannula  -NS     Post SpO2 (%) 93  -NS     O2 Delivery Post Treatment nasal cannula  -NS     Pre Patient Position Supine  -NS     Intra Patient Position Standing  -NS     Post Patient Position Supine  -NS       Row Name 12/07/23 0755          Positioning and Restraints    Pre-Treatment Position in bed  -NS     Post Treatment Position bed  -NS     In Bed notified nsg;supine;call light within reach;encouraged to call for  assist;exit alarm on;side rails up x2  -NS               User Key  (r) = Recorded By, (t) = Taken By, (c) = Cosigned By      Initials Name Provider Type    Elvi Gonzalez PT Physical Therapist                   Outcome Measures       Row Name 12/07/23 0755          How much help from another person do you currently need...    Turning from your back to your side while in flat bed without using bedrails? 4  -NS     Moving from lying on back to sitting on the side of a flat bed without bedrails? 3  -NS     Moving to and from a bed to a chair (including a wheelchair)? 3  -NS     Standing up from a chair using your arms (e.g., wheelchair, bedside chair)? 3  -NS     Climbing 3-5 steps with a railing? 2  -NS     To walk in hospital room? 3  -NS     AM-PAC 6 Clicks Score (PT) 18  -NS     Highest Level of Mobility Goal 6 --> Walk 10 steps or more  -NS       Row Name 12/07/23 0755          Functional Assessment    Outcome Measure Options AM-PAC 6 Clicks Basic Mobility (PT)  -NS               User Key  (r) = Recorded By, (t) = Taken By, (c) = Cosigned By      Initials Name Provider Type    Elvi Gonzalez PT Physical Therapist                                 Physical Therapy Education       Title: PT OT SLP Therapies (In Progress)       Topic: Physical Therapy (In Progress)       Point: Mobility training (Done)       Learning Progress Summary             Patient Acceptance, E, VU,NR by NS at 12/7/2023 0931    Comment: safety with mobility, benefits of activity for pain management                         Point: Home exercise program (Not Started)       Learner Progress:  Not documented in this visit.              Point: Body mechanics (Done)       Learning Progress Summary             Patient Acceptance, E, VU,NR by NS at 12/7/2023 0931    Comment: safety with mobility, benefits of activity for pain management                         Point: Precautions (Done)       Learning Progress Summary             Patient Acceptance,  E, VU,NR by NS at 12/7/2023 0931    Comment: safety with mobility, benefits of activity for pain management                                         User Key       Initials Effective Dates Name Provider Type Discipline    NS 06/16/21 -  Elvi Melchor PT Physical Therapist PT                  PT Recommendation and Plan  Planned Therapy Interventions (PT): balance training, bed mobility training, gait training, home exercise program, neuromuscular re-education, strengthening, patient/family education, transfer training  Plan of Care Reviewed With: patient  Progress: no change  Outcome Evaluation: Patient presents with weakness, poor activity tolerance, balance impairments, and pain affecting functional mobility. She took a few side steps towards HOB but demonstrated unsteadiness- activity limited by pain. Skilled IP PT services warranted to address deficits. Recommend IRF at d/c to support return to independence.     Time Calculation:   PT Evaluation Complexity  History, PT Evaluation Complexity: 3 or more personal factors and/or comorbidities  Examination of Body Systems (PT Eval Complexity): total of 4 or more elements  Clinical Presentation (PT Evaluation Complexity): stable  Clinical Decision Making (PT Evaluation Complexity): low complexity  Overall Complexity (PT Evaluation Complexity): low complexity     PT Charges       Row Name 12/07/23 0755             Time Calculation    Start Time 0755  -NS      PT Received On 12/07/23  -NS      PT Goal Re-Cert Due Date 12/17/23  -NS         Untimed Charges    PT Eval/Re-eval Minutes 47  -NS         Total Minutes    Untimed Charges Total Minutes 47  -NS       Total Minutes 47  -NS                User Key  (r) = Recorded By, (t) = Taken By, (c) = Cosigned By      Initials Name Provider Type    Elvi Gonzalez, DRALEEN Physical Therapist                  Therapy Charges for Today       Code Description Service Date Service Provider Modifiers Qty    81436464496 HC PT EVAL LOW  COMPLEXITY 4 12/7/2023 Elvi Melchor, PT GP 1            PT G-Codes  Outcome Measure Options: AM-PAC 6 Clicks Basic Mobility (PT)  AM-PAC 6 Clicks Score (PT): 18  PT Discharge Summary  Anticipated Discharge Disposition (PT): inpatient rehabilitation facility    Elvi Melchor PT  12/7/2023

## 2023-12-07 NOTE — CASE MANAGEMENT/SOCIAL WORK
Continued Stay Note  [facility]     Patient Name: Ludy Stock  MRN: [id number]  Today's Date: 12/7/2023    Admit Date: 12/5/2023    Plan: Home with home health   Discharge Plan       Row Name 12/07/23 6757       Plan    Plan Home with home health    Patient/Family in Agreement with Plan yes    Plan Comments Possible discharge tomorrow. Spoke to patient in room to discuss PT's recommendation of IRF. Patient declined. She requested [facility]. Order in [facility]. Referral called and accepted by Maris with [facility] for SN, PT/OT. They can start seeing patient on Monday, 12/11. CM will continue to follow.    Final Discharge Disposition Code 06 - home with home health care                   Discharge Codes    No documentation.                 Expected Discharge Date and Time       Expected Discharge Date Expected Discharge Time    Dec 7, 2023               Heidy Gutierrez RN

## 2023-12-07 NOTE — PLAN OF CARE
Goal Outcome Evaluation:  A&Ox4, VSS, complains of chronic back pain. Radial site clean and dry. Noticeably weak with ambulation- alarms on and audible. Resting comfortably in bed. Will continue to monitor.

## 2023-12-08 ENCOUNTER — READMISSION MANAGEMENT (OUTPATIENT)
Dept: CALL CENTER | Facility: HOSPITAL | Age: 73
End: 2023-12-08
Payer: MEDICARE

## 2023-12-08 VITALS
HEIGHT: 60 IN | RESPIRATION RATE: 16 BRPM | SYSTOLIC BLOOD PRESSURE: 124 MMHG | OXYGEN SATURATION: 96 % | BODY MASS INDEX: 32.22 KG/M2 | WEIGHT: 164.1 LBS | HEART RATE: 71 BPM | DIASTOLIC BLOOD PRESSURE: 80 MMHG | TEMPERATURE: 97.7 F

## 2023-12-08 PROBLEM — I50.33 ACUTE ON CHRONIC HEART FAILURE WITH PRESERVED EJECTION FRACTION (HFPEF): Status: ACTIVE | Noted: 2023-12-08

## 2023-12-08 PROBLEM — I50.33 ACUTE ON CHRONIC HEART FAILURE WITH PRESERVED EJECTION FRACTION (HFPEF): Status: RESOLVED | Noted: 2023-12-08 | Resolved: 2023-12-08

## 2023-12-08 PROBLEM — I21.9 TYPE 1 MYOCARDIAL INFARCTION: Status: ACTIVE | Noted: 2023-12-08

## 2023-12-08 LAB
ANION GAP SERPL CALCULATED.3IONS-SCNC: 8 MMOL/L (ref 5–15)
BUN SERPL-MCNC: 22 MG/DL (ref 8–23)
BUN/CREAT SERPL: 19.5 (ref 7–25)
CALCIUM SPEC-SCNC: 9.6 MG/DL (ref 8.6–10.5)
CHLORIDE SERPL-SCNC: 102 MMOL/L (ref 98–107)
CO2 SERPL-SCNC: 31 MMOL/L (ref 22–29)
CREAT SERPL-MCNC: 1.13 MG/DL (ref 0.57–1)
EGFRCR SERPLBLD CKD-EPI 2021: 51.2 ML/MIN/1.73
GLUCOSE SERPL-MCNC: 97 MG/DL (ref 65–99)
POTASSIUM SERPL-SCNC: 4.5 MMOL/L (ref 3.5–5.2)
QT INTERVAL: 376 MS
QT INTERVAL: 396 MS
QTC INTERVAL: 411 MS
QTC INTERVAL: 427 MS
SODIUM SERPL-SCNC: 141 MMOL/L (ref 136–145)

## 2023-12-08 PROCEDURE — 80048 BASIC METABOLIC PNL TOTAL CA: CPT | Performed by: NURSE PRACTITIONER

## 2023-12-08 PROCEDURE — 99232 SBSQ HOSP IP/OBS MODERATE 35: CPT | Performed by: NURSE PRACTITIONER

## 2023-12-08 PROCEDURE — 99239 HOSP IP/OBS DSCHRG MGMT >30: CPT | Performed by: STUDENT IN AN ORGANIZED HEALTH CARE EDUCATION/TRAINING PROGRAM

## 2023-12-08 RX ORDER — LISINOPRIL 20 MG/1
20 TABLET ORAL DAILY
Qty: 30 TABLET | Refills: 11 | Status: SHIPPED | OUTPATIENT
Start: 2023-12-09

## 2023-12-08 RX ORDER — ASPIRIN 81 MG/1
81 TABLET ORAL DAILY
Qty: 30 TABLET | Refills: 0 | Status: SHIPPED | OUTPATIENT
Start: 2023-12-08

## 2023-12-08 RX ORDER — METOPROLOL SUCCINATE 25 MG/1
12.5 TABLET, EXTENDED RELEASE ORAL
Qty: 15 TABLET | Refills: 11 | Status: SHIPPED | OUTPATIENT
Start: 2023-12-09

## 2023-12-08 RX ORDER — ROSUVASTATIN CALCIUM 20 MG/1
20 TABLET, COATED ORAL NIGHTLY
Qty: 30 TABLET | Refills: 11 | Status: SHIPPED | OUTPATIENT
Start: 2023-12-08

## 2023-12-08 RX ORDER — CLOPIDOGREL BISULFATE 75 MG/1
75 TABLET ORAL DAILY
Qty: 30 TABLET | Refills: 11 | Status: SHIPPED | OUTPATIENT
Start: 2023-12-09

## 2023-12-08 RX ORDER — FUROSEMIDE 80 MG
80 TABLET ORAL AS NEEDED
Start: 2023-12-08

## 2023-12-08 RX ADMIN — CLOPIDOGREL BISULFATE 75 MG: 75 TABLET ORAL at 09:07

## 2023-12-08 RX ADMIN — Medication 1 TABLET: at 09:07

## 2023-12-08 RX ADMIN — OXYCODONE HYDROCHLORIDE AND ACETAMINOPHEN 1 TABLET: 10; 325 TABLET ORAL at 04:11

## 2023-12-08 RX ADMIN — ASPIRIN 81 MG: 81 TABLET, COATED ORAL at 09:07

## 2023-12-08 RX ADMIN — PANTOPRAZOLE SODIUM 40 MG: 40 TABLET, DELAYED RELEASE ORAL at 05:40

## 2023-12-08 RX ADMIN — LISINOPRIL 20 MG: 20 TABLET ORAL at 09:07

## 2023-12-08 RX ADMIN — APIXABAN 5 MG: 5 TABLET, FILM COATED ORAL at 09:07

## 2023-12-08 RX ADMIN — SENNOSIDES AND DOCUSATE SODIUM 2 TABLET: 8.6; 5 TABLET ORAL at 09:07

## 2023-12-08 RX ADMIN — Medication 10 ML: at 09:12

## 2023-12-08 RX ADMIN — METOPROLOL SUCCINATE 12.5 MG: 25 TABLET, EXTENDED RELEASE ORAL at 09:07

## 2023-12-08 RX ADMIN — OXYCODONE HYDROCHLORIDE AND ACETAMINOPHEN 1 TABLET: 10; 325 TABLET ORAL at 10:28

## 2023-12-08 RX ADMIN — LEVOTHYROXINE SODIUM 175 MCG: 0.17 TABLET ORAL at 05:40

## 2023-12-08 RX ADMIN — FUROSEMIDE 80 MG: 40 TABLET ORAL at 09:08

## 2023-12-08 NOTE — CASE MANAGEMENT/SOCIAL WORK
Case Management Discharge Note      Final Note: Patient discharging to home with home health today. Updated Maris with [facility] on discharge today. Order in [facility]. Referral was accepted by [facility] with [facility] for SN, PT/OT. They will start seeing patient on Monday, 12/11. Family to transport. No other needs noted.         Selected Continued Care - Admitted Since 12/5/2023       Destination    No services have been selected for the patient.                Durable Medical Equipment    No services have been selected for the patient.                Dialysis/Infusion    No services have been selected for the patient.                Home Medical Care       Service Provider Selected Services Address Phone Fax Patient Preferred    [facility]  [facility] [address] [id number] [phone] --              Therapy    No services have been selected for the patient.                Community Resources    No services have been selected for the patient.                Community & DME    No services have been selected for the patient.                    Transportation Services  Private: Car    Final Discharge Disposition Code: 06 - home with home health care

## 2023-12-08 NOTE — PLAN OF CARE
Goal Outcome Evaluation:           Progress: no change  Outcome Evaluation: Pt is A&O with VSS, A-paced on the monitor, and on RA to 2L NC when asleep. She complained of constant pain in her back, knees, elbows, and right shoulder that was partially relieved with PRN pain medication. There are no other complaints at this time.

## 2023-12-08 NOTE — DISCHARGE SUMMARY
[facility] Medicine Services  DISCHARGE SUMMARY    Patient Name: Ludy Stock  : 1950  MRN: [id number]    Date of Admission: 2023 11:36 PM  Date of Discharge:  2023  Primary Care Physician: Roger Hopper MD    Consults       Date and Time Order Name Status Description    2023  4:06 AM Inpatient Cardiology Consult Completed             Hospital Course     Presenting Problem: Chest pain/NSTEMI    Active Hospital Problems    Diagnosis  POA    **Chest pain [R07.9]  Yes    Type 1 myocardial infarction [I21.9]  Yes    PAF (paroxysmal atrial fibrillation) [I48.0]  Yes    Diastolic CHF, chronic [I50.32]  Yes    NSTEMI (non-ST elevated myocardial infarction) [I21.4]  Yes    NSTEMI, initial episode of care [I21.4]  Unknown    Coronary artery disease involving native coronary artery of native heart with other form of angina pectoris [I25.118]  Yes    SSS (sick sinus syndrome) [I49.5]  Yes    Peptic ulcer disease [K27.9]  Yes    Crohn's disease, small and large intestine [K50.80]  Yes      Resolved Hospital Problems    Diagnosis Date Resolved POA    Acute on chronic heart failure with preserved ejection fraction (HFpEF) [I50.33] 2023 Yes          Hospital Course:  Ludy Stock is a 73 y.o. female  with PMH significant for CAD, PAF on Eliquis, DVT, HTN, hypothyroid disease, peptic ulcer disease, PTSD, pulmonary hypertension, SSS s/p PPM, SLE, who presented to the ED with complaint of chest pain.  She is status post left heart cath on  with stent placed.  She needs to remain on Eliquis and clopidogrel for 1 year.  Echo performed on  was within normal limits, EF was 60% and no hemodynamically significant valvular heart disease was seen.  She is to also continue aspirin for 30 days with Plavix and Eliquis and then may DC aspirin.  Will follow-up with Dr. Rachel in January.    After her left heart cath she did have some dyspnea on exertion and she was given  some IV diuresis with improvement.  Creatinine was monitored with diuresis given her underlying CKD.  She was also given oxybutynin as needed bladder spasm.      Discharge Follow Up Recommendations for outpatient labs/diagnostics:   Cardiology, PCP    Day of Discharge     HPI:   Resting comfortably, ready to go home    Review of Systems  Gen- No fevers, chills  CV- No chest pain, palpitations  Resp- No cough, dyspnea  GI- No N/V/D, abd pain      Vital Signs:   Temp:  [96.7 °F (35.9 °C)-98.1 °F (36.7 °C)] 98.1 °F (36.7 °C)  Heart Rate:  [69-89] 71  Resp:  [16-18] 18  BP: ()/(51-70) 104/70  Flow (L/min):  [2] 2      Physical Exam:  Constitutional: No acute distress, awake, alert  HENT: NCAT, mucous membranes moist  Respiratory: Clear to auscultation bilaterally, respiratory effort normal   Cardiovascular: RRR, no murmurs, rubs, or gallops  Gastrointestinal: Positive bowel sounds, soft, nontender, nondistended  Musculoskeletal: No bilateral ankle edema  Psychiatric: Appropriate affect, cooperative  Neurologic: Oriented x 3, strength symmetric in all extremities, Cranial Nerves grossly intact to confrontation, speech clear  Skin: No rashes      Pertinent  and/or Most Recent Results     LAB RESULTS:      Lab 12/07/23  0445 12/06/23  1147 12/06/23  0435 12/05/23  2355   WBC 6.51  --  4.56 4.70   HEMOGLOBIN 12.9  --  11.6* 12.0   HEMATOCRIT 40.4  --  35.6 36.5   PLATELETS 234  --  224 286   NEUTROS ABS 4.53  --  1.98 2.15   IMMATURE GRANS (ABS) 0.02  --  0.02 0.02   LYMPHS ABS 1.15  --  1.87 1.76   MONOS ABS 0.55  --  0.42 0.46   EOS ABS 0.21  --  0.21 0.25   MCV 92.4  --  91.3 93.8   PROTIME  --   --   --  12.3   APTT  --  57.6* 29.3*  --    HEPARIN ANTI-XA  --   --  0.25*  --    D DIMER QUANT  --   --   --  0.50         Lab 12/08/23  0457 12/07/23  0445 12/06/23  0435 12/05/23  9865   SODIUM 141 138 141 142   POTASSIUM 4.5 4.5 3.9 3.9   CHLORIDE 102 102 105 107   CO2 31.0* 28.0 25.0 26.0   ANION GAP 8.0 8.0 11.0  9.0   BUN 22 19 18 17   CREATININE 1.13* 1.12* 0.80 0.89   EGFR 51.2* 51.7* 77.4 68.1   GLUCOSE 97 97 115* 114*   CALCIUM 9.6 9.9 9.3 8.9   MAGNESIUM  --   --  2.1  --    HEMOGLOBIN A1C  --   --  5.40  --          Lab 12/05/23  2355   TOTAL PROTEIN 5.5*   ALBUMIN 3.7   GLOBULIN 1.8   ALT (SGPT) <5   AST (SGOT) 17   BILIRUBIN 0.2   ALK PHOS 79   LIPASE 16         Lab 12/06/23  0300 12/06/23  0058 12/05/23  2355   PROBNP  --   --  503.5   HSTROP T 40* 24*  --    PROTIME  --   --  12.3   INR  --   --  0.91         Lab 12/06/23  0435   CHOLESTEROL 380*   LDL CHOL 174*   HDL CHOL 39*   TRIGLYCERIDES 778*             Brief Urine Lab Results       None          Microbiology Results (last 10 days)       ** No results found for the last 240 hours. **            Adult Transthoracic Echo Complete w/ Color, Spectral and Contrast if necessary per protocol    Result Date: 12/7/2023    Estimated left ventricular EF = 60%   No hemodynamically significant valvular heart disease     Cardiac Catheterization/Vascular Study    Result Date: 12/6/2023    90% distal RCA ISR status post OCT guided PCI with a 3.0 x 28 mm Xience, postdilated with a 4.0 proximal and a 3.5 mid to distal.   LAD 20 to 30% proximal, first diagonal 20-30% ostial, proximal circumflex 20-30%.  Proximal RCA 20 to 30% ISR.   LV pressures (S/D/E) : 109/8/17 mmHg Sent home on Eliquis and clopidogrel for 12 months then switch clopidogrel back to aspirin     Optical Coherence Tomography    Result Date: 12/6/2023    90% distal RCA ISR status post OCT guided PCI with a 3.0 x 28 mm Xience, postdilated with a 4.0 proximal and a 3.5 mid to distal.   LAD 20 to 30% proximal, first diagonal 20-30% ostial, proximal circumflex 20-30%.  Proximal RCA 20 to 30% ISR.   LV pressures (S/D/E) : 109/8/17 mmHg Sent home on Eliquis and clopidogrel for 12 months then switch clopidogrel back to aspirin     XR Chest 1 View    Result Date: 12/6/2023  XR CHEST 1 VW Date of Exam: 12/6/2023 12:21 AM  EST Indication: Chest Pain Triage Protocol Comparison: Chest radiograph dated September 23, 2015 Findings: There is a left-sided dual-lead pacemaker device in place with leads over the right atrium and right ventricle. The heart is enlarged. The pulmonary vascular markings are normal. The lungs are clear     Impression: No active disease Electronically Signed: Benny Jerome MD  12/6/2023 12:24 AM EST  Workstation ID: [id number]             Results for orders placed during the hospital encounter of 12/05/23    Adult Transthoracic Echo Complete w/ Color, Spectral and Contrast if necessary per protocol    Interpretation Summary    Estimated left ventricular EF = 60%    No hemodynamically significant valvular heart disease      Plan for Follow-up of Pending Labs/Results: no pending results    Discharge Details        Discharge Medications        New Medications        Instructions Start Date   clopidogrel 75 MG tablet  Commonly known as: PLAVIX   75 mg, Oral, Daily   Start Date: December 9, 2023     metoprolol succinate XL 25 MG 24 hr tablet  Commonly known as: TOPROL-XL   12.5 mg, Oral, Every 24 Hours Scheduled   Start Date: December 9, 2023     rosuvastatin 20 MG tablet  Commonly known as: CRESTOR   20 mg, Oral, Nightly             Changes to Medications        Instructions Start Date   aspirin 81 MG EC tablet  What changed:   how much to take  additional instructions   81 mg, Oral, Daily, Take for 30 days and then discontinue      furosemide 80 MG tablet  Commonly known as: LASIX  What changed:   when to take this  reasons to take this   80 mg, Oral, As Needed             Continue These Medications        Instructions Start Date   apixaban 5 MG tablet tablet  Commonly known as: ELIQUIS   5 mg, Oral, 2 Times Daily      CoQ-10 100 MG capsule   1 capsule, Oral, Daily      levothyroxine 125 MCG tablet  Commonly known as: SYNTHROID, LEVOTHROID   175 mcg, Oral, Daily      linaclotide 290 MCG capsule capsule  Commonly known  as: LINZESS   290 mcg, Oral, Every Morning Before Breakfast      lisinopril 20 MG tablet  Commonly known as: PRINIVIL,ZESTRIL   20 mg, Oral, Daily   Start Date: December 9, 2023     nitroglycerin 0.4 MG SL tablet  Commonly known as: NITROSTAT   0.4 mg, Sublingual, Every 5 Minutes PRN      oxybutynin XL 10 MG 24 hr tablet  Commonly known as: DITROPAN-XL   10 mg, Oral, Daily      oxyCODONE-acetaminophen  MG per tablet  Commonly known as: PERCOCET   1 tablet, Oral, Every 6 Hours PRN               No Known Allergies      Discharge Disposition:  Home or Self Carehome    Diet:  Hospital:  Diet Order   Procedures    Diet: Cardiac Diets; Healthy Heart (2-3 Na+); Texture: Regular Texture (IDDSI 7); Fluid Consistency: Thin (IDDSI 0)            Activity:  as tolerated    Restrictions or Other Recommendations:  none       CODE STATUS:    Code Status and Medical Interventions:   Ordered at: 12/06/23 0236     Code Status (Patient has no pulse and is not breathing):    CPR (Attempt to Resuscitate)     Medical Interventions (Patient has pulse or is breathing):    Full Support       Future Appointments   Date Time Provider Department Center   1/10/2024 10:00 AM Jake Rachel MD [facility]       Additional Instructions for the Follow-ups that You Need to Schedule       Ambulatory Referral to Cardiac Rehab   As directed      Ambulatory Referral to Home Health   As directed      Face to Face Visit Date: 12/7/2023   Follow-up provider for Plan of Care?: I treated the patient in an acute care facility and will not continue treatment after discharge.   Follow-up provider: KARL PRATHER [[id number]]   Reason/Clinical Findings: chest pain, CHF, CAD   Describe mobility limitations that make leaving home difficult: impaired functional mobility, balance, gait and endurance   Nursing/Therapeutic Services Requested: Skilled Nursing Physical Therapy Occupational Therapy   Skilled nursing orders: Cardiopulmonary assessments  Neurovascular assessments Medication education   PT orders: Therapeutic exercise Gait Training Transfer training Strengthening Home safety assessment   Weight Bearing Status: As Tolerated   Occupational orders: Home safety assessment Activities of daily living Energy conservation Strengthening   Frequency: 1 Week 1                      Rocio Ragland MD  12/08/23      Time Spent on Discharge:  I spent  45  minutes on this discharge activity which included: face-to-face encounter with the patient, reviewing the data in the system, coordination of the care with the nursing staff as well as consultants, documentation, and entering orders.

## 2023-12-08 NOTE — OUTREACH NOTE
Prep Survey      Flowsheet Row Responses   [facility] facility patient discharged from? [facility]   Is LACE score < 7 ? No   Eligibility Readm Mgmt   Discharge diagnosis Type 1 myocardial infarction   Does the patient have one of the following disease processes/diagnoses(primary or secondary)? Acute MI (STEMI,NSTEMI)   Does the patient have Home health ordered? Yes   What is the Home health agency?  [facility]   Is there a DME ordered? No   Prep survey completed? Yes            MARIA VICTORIA FRANKLIN - Registered Nurse

## 2023-12-08 NOTE — PROGRESS NOTES
"  [facility]   Inpatient Progress Note       LOS: 2 days   Patient Care Team:  Roger Hopper MD as PCP - General (Family Medicine)  Jake Rachel MD as Consulting Physician (Cardiology)    Chief Complaint:  follow-up for CAD    Subjective     Interval History:     Patient in bed. Notes that her breathing feels better today. Also her abdominal pain is improved. Patient wishes to go home.     Review of Systems:   Pertinent positives noted in history, exam, and assessment. Otherwise reviewed and negative.      Objective     Vitals:  Blood pressure 101/61, pulse 71, temperature 98.1 °F (36.7 °C), temperature source Oral, resp. rate 16, height 152.4 cm (60\"), weight 74.4 kg (164 lb 1.6 oz), SpO2 96%.     Intake/Output Summary (Last 24 hours) at 12/8/2023 0908  Last data filed at 12/8/2023 0847  Gross per 24 hour   Intake 360 ml   Output --   Net 360 ml     Vitals reviewed.   Constitutional:       Appearance: Well-developed and not in distress.   Neck:      Vascular: No JVD.      Trachea: No tracheal deviation.   Pulmonary:      Effort: Pulmonary effort is normal.      Breath sounds: Normal breath sounds.   Cardiovascular:      Normal rate. Regular rhythm.      Murmurs: There is no murmur.      Comments: Right radial access site benign  Pulses:     Intact distal pulses.   Edema:     Peripheral edema absent.   Musculoskeletal:         General: No deformity. Skin:     General: Skin is warm and dry.   Neurological:      Mental Status: Alert and oriented to person, place, and time.            Results Review:     I reviewed the patient's new clinical results.    Results from last 7 days   Lab Units 12/07/23  0445   WBC 10*3/mm3 6.51   HEMOGLOBIN g/dL 12.9   HEMATOCRIT % 40.4   PLATELETS 10*3/mm3 234     Results from last 7 days   Lab Units 12/08/23  0457 12/06/23  0435 12/05/23  2355   SODIUM mmol/L 141   < > 142   POTASSIUM mmol/L 4.5   < > 3.9   CHLORIDE mmol/L 102   < > 107   CO2 mmol/L " 31.0*   < > 26.0   BUN mg/dL 22   < > 17   CREATININE mg/dL 1.13*   < > 0.89   CALCIUM mg/dL 9.6   < > 8.9   BILIRUBIN mg/dL  --   --  0.2   ALK PHOS U/L  --   --  79   ALT (SGPT) U/L  --   --  <5   AST (SGOT) U/L  --   --  17   GLUCOSE mg/dL 97   < > 114*    < > = values in this interval not displayed.     Results from last 7 days   Lab Units 12/08/23  0457   SODIUM mmol/L 141   POTASSIUM mmol/L 4.5   CHLORIDE mmol/L 102   CO2 mmol/L 31.0*   BUN mg/dL 22   CREATININE mg/dL 1.13*   GLUCOSE mg/dL 97   CALCIUM mg/dL 9.6     Results from last 7 days   Lab Units 12/05/23  2355   INR  0.91     Lab Results   Lab Value Date/Time    TROPONINT 40 (H) 12/06/2023 0300    TROPONINT 24 (H) 12/06/2023 0058    TROPONINT <0.010 08/20/2022 0945         Results from last 7 days   Lab Units 12/06/23  0435   CHOLESTEROL mg/dL 380*   TRIGLYCERIDES mg/dL 778*   HDL CHOL mg/dL 39*   LDL CHOL mg/dL 174*     Results from last 7 days   Lab Units 12/05/23  2355   PROBNP pg/mL 503.5     Results from last 7 days   Lab Units 12/06/23  0300 12/06/23  0058   HSTROP T ng/L 40* 24*     LHC:    90% distal RCA ISR status post OCT guided PCI with a 3.0 x 28 mm Xience, postdilated with a 4.0 proximal and a 3.5 mid to distal.    LAD 20 to 30% proximal, first diagonal 20-30% ostial, proximal circumflex 20-30%.  Proximal RCA 20 to 30% ISR.    LV pressures (S/D/E) : 109/8/17 mmHg    Tele:  SR    ECHO    Estimated left ventricular EF = 60%    No hemodynamically significant valvular heart disease    Assessment:      Chest pain    Peptic ulcer disease    Crohn's disease, small and large intestine    SSS (sick sinus syndrome)    Coronary artery disease involving native coronary artery of native heart with other form of angina pectoris    PAF (paroxysmal atrial fibrillation)    Diastolic CHF, chronic    NSTEMI, initial episode of care    NSTEMI (non-ST elevated myocardial infarction)      CAD/NSTEMI  Echo normal   HTN  Dyslipidemia   PAF on  Eliquis    Plan:  Patient stable from CV standpoint.  OK for discharge.   Will address cardiac medications in ADT.  ASA for 30 days with Plavix and Eliquis. Then may DC ASA  Keep scheduled appt with Dr. Rachel in January.     ESTHER Smith   Dictated utilizing Dragon dictation

## 2023-12-11 LAB
QT INTERVAL: 406 MS
QTC INTERVAL: 441 MS

## 2023-12-12 ENCOUNTER — READMISSION MANAGEMENT (OUTPATIENT)
Dept: CALL CENTER | Facility: HOSPITAL | Age: 73
End: 2023-12-12
Payer: MEDICARE

## 2023-12-12 NOTE — OUTREACH NOTE
AMI Week 1 Survey      Flowsheet Row Responses   [facility] patient discharged from? [facility]   Does the patient have one of the following disease processes/diagnoses(primary or secondary)? Acute MI (STEMI,NSTEMI)   Week 1 attempt successful? Yes   Call start time 1444   Call end time 1446   Discharge diagnosis Type 1 myocardial infarction   Person spoke with today (if not patient) and relationship patient   Meds reviewed with patient/caregiver? Yes   Does the patient have all prescriptions related to this admission filled (includes statins,anticoagulants,HTN meds,anti-arrhythmia meds) Yes   Prescription comments no concerns or questions noted.   Is the patient taking all medications as directed (includes completed medication regime)? Yes   Does the patient have a primary care provider?  Yes   Does the patient have an appointment with their PCP,cardiologist,or clinic within 7 days of discharge? Yes   Comments regarding PCP Roger Hopper MD (Family Medicine) on 12/15/2023,  PLEASE ARRIVE 15 MINUTES BEFORE A 9:15AM APPOINTMENT   What is the Home health agency?  [facility]   Has home health visited the patient within 72 hours of discharge? Yes   Psychosocial issues? No   Did the patient receive a copy of their discharge instructions? Yes   Nursing interventions Reviewed instructions with patient   What is the patient's perception of their health status since discharge? Improving   Nursing interventions Nurse provided patient education   Is the patient/caregiver able to teach back signs and symptoms of when to call for help immediately: Sudden chest discomfort, Sudden discomfort in arms, back, neck or jaw, Sudden sweating or clammy skin, Dizziness or lightheadedness, Irregular or rapid heart rate, Nausea or vomiting, Shortness of breath at any time   Nursing interventions Nurse provided patient education   Is the patient/caregiver able to teach back the hierarchy of who to call/visit for symptoms/problems?  PCP, Specialist, Home health nurse, Urgent Care, ED, 911 Yes   Week 1 call completed? Yes   Is the patient interested in additional calls from an ambulatory ? No   Would this patient benefit from a Referral to [facility] Social Work? No   Wrap up additional comments Patient reports doing well. Sees her pcp this week. HH is still coming to the home- no concerns or questions noted.   Call end time 1446            Sujey PHAM - Registered Nurse

## 2024-01-09 NOTE — PROGRESS NOTES
Subjective:     Encounter Date:01/10/2024    Primary Care Physician: Roger Hopper MD      Patient ID: Ludy Stock is a 73 y.o. female.    Chief Complaint:Coronary Artery Disease    PROBLEM LIST:   Symptomatic bradycardia:   Status post dual-chamber [employer] pacemaker, 01/04/2010.    Palpitations associated with syncopal episode in October 2009.  Event recorder, November 2009, revealing sinus bradycardia with heart rates as low as 38 (during waking hours).  3/2023 generator at [facility]  Coronary artery disease:  History of cardiac catheterizations, 2000 and 2003, and 2011.  Reported PCI at [facility], 2011.  Pharmacologic MPS, 03/10/2015: Normal perfusion.   Normal LVEF.  2009 3.5 x 32 stent to RCA performed in [facility]  12/2022 normal MPS (Dr. Samayoa's office)  12/6/2023 NSTEMI 90% distal RCA ISR (3 x 28 Xience).  20 to 30% LAD proximal, 20 to 30% first diagonal, proximal 20 to 30% RCA.  Pulmonary hypertension:  Secondary to Fen-Phen use.  Echocardiogram, 10/28/2009: Normal LVEF, RVSP 32 mmHg.  Echocardiogram,  03/10/2015:  Normal.  Hypertension  Dyslipidemia  Reported h/o Atrial fibrillation  On Eliquis, CHADSVASC 5  Right breast cancer  Treated with lumpectomy  Systemic lupus erythematosus.  Fibromyalgia.   History of peptic ulcer disease.  Crohn’s disease.  History of DVT in 1995.  PTSD.  History of parathyroid disease, status post parathyroidectomy.  Chronic constipation  Nephrolithiasis  Hypothyroidism  Surgical history:  Partial colon resection x2.  Bilateral bunionectomy.  Tonsillectomy.  Hysterectomy.  Bladder resection.  Right carpal tunnel repair.  Benign  breast lumpectomy.  Bilateral breast implants  Cholecystectomy  Right rotator cuff repair      No Known Allergies      Current Outpatient Medications:     apixaban (ELIQUIS) 5 MG tablet tablet, Take 1 tablet by mouth 2 (Two) Times a Day., Disp: , Rfl:     aspirin 81 MG EC tablet, Take 1 tablet by mouth Daily for 30 days and then  discontinue, Disp: 30 tablet, Rfl: 0    clopidogrel (PLAVIX) 75 MG tablet, Take 1 tablet by mouth Daily., Disp: 30 tablet, Rfl: 11    Coenzyme Q10 (CoQ-10) 100 MG capsule, Take 1 capsule by mouth Daily., Disp: , Rfl:     furosemide (LASIX) 80 MG tablet, Take 1 tablet by mouth As Needed (as directed)., Disp: , Rfl:     levothyroxine (SYNTHROID, LEVOTHROID) 125 MCG tablet, Take 175 mcg by mouth Daily., Disp: , Rfl:     linaclotide (LINZESS) 290 MCG capsule capsule, Take 1 capsule by mouth Every Morning Before Breakfast., Disp: , Rfl:     lisinopril (PRINIVIL,ZESTRIL) 20 MG tablet, Take 1 tablet by mouth Daily., Disp: 30 tablet, Rfl: 11    metoprolol succinate XL (TOPROL-XL) 25 MG 24 hr tablet, Take 0.5 (one-half) tablet by mouth Daily., Disp: 15 tablet, Rfl: 11    nitroglycerin (NITROSTAT) 0.4 MG SL tablet, Place 1 tablet under the tongue Every 5 (Five) Minutes As Needed for Chest Pain., Disp: , Rfl:     oxybutynin XL (DITROPAN-XL) 10 MG 24 hr tablet, Take 1 tablet by mouth Daily., Disp: , Rfl:     oxyCODONE-acetaminophen (PERCOCET)  MG per tablet, Take 1 tablet by mouth Every 6 (Six) Hours As Needed for Moderate Pain., Disp: , Rfl:     rosuvastatin (CRESTOR) 20 MG tablet, Take 1 tablet by mouth Every Night., Disp: 30 tablet, Rfl: 11    Current Facility-Administered Medications:     albuterol (PROVENTIL) nebulizer solution 0.083% 2.5 mg/3mL, 2.5 mg, Nebulization, Once, Tejal Sands S, APRN        History of Present Illness    Patient is a 73-year-old  female who returns today for hospital follow-up status post recent admission with NSTEMI and subsequent distal RCA stenting.  Since being discharged she notes overall feeling poorly.  She notes that she is currently caring for her  who has cancer.  She has had some intermittent chest pain since discharge.  This has not been consistent.  But is described as a significant pressure sensation in her chest.  Also notes that her blood pressure has been  "significantly elevated since discharge.  She notes that she is taken aspirin for her chest pain and typically this alleviates the symptoms she has not had to use sublingual nitroglycerin.  No reported syncope or presyncope.  Does note some positional dizziness.  Complains of easy bruising.    The following portions of the patient's history were reviewed and updated as appropriate: allergies, current medications, past family history, past medical history, past social history, past surgical history and problem list.    Social History     Tobacco Use    Smoking status: Never    Smokeless tobacco: Never   Vaping Use    Vaping Use: Never used   Substance Use Topics    Alcohol use: No    Drug use: Never         ROS       Objective:   /88   Pulse 71   Ht 152.4 cm (60\")   Wt 73 kg (161 lb)   SpO2 96%   BMI 31.44 kg/m²         Vitals reviewed.   Constitutional:       Appearance: Well-developed and not in distress.   Neck:      Vascular: No JVD.      Trachea: No tracheal deviation.   Pulmonary:      Effort: Pulmonary effort is normal.      Breath sounds: Normal breath sounds.   Cardiovascular:      Normal rate. Regular rhythm.      Murmurs: There is no murmur.      Comments: Right radial site with noted healing bruise.  Possible mild healing hematoma.  Edema:     Peripheral edema absent.   Musculoskeletal:         General: No deformity. Neurological:      Mental Status: Alert and oriented to person, place, and time.         Procedures  Device check:  Normal functioning dual-chamber pacemaker.  7 mode switch episodes most recent 12/4/2023.  Battery voltage 3.01 V 8.4 to 8.7 years.  Atrially paced 86%.  Rate of 70.  No changes made.  Currently programmed AAIR.        Assessment:   Assessment & Plan      Diagnoses and all orders for this visit:    1. Coronary artery disease involving native coronary artery of native heart with other form of angina pectoris (Primary), some recurrent chest pain.  Suspect this is related " to uncontrolled blood pressure and stress.  Currently on aspirin and Plavix.    2. SSS (sick sinus syndrome), normal functioning device.  Some mode switching noted.  Chronically anticoagulated with Eliquis.    3. Hypertension, unspecified type, uncontrolled.  Reported high readings at home.      Plan:  Will add isosorbide 30 mg daily to patient's medication regimen.  As patient is 30 days post intervention we will discontinue aspirin and maintain Plavix and Eliquis.  Check CBC, CMP, TSH to further evaluate patient's fatigue.  Continue other current cardiac medications.  Discussed with patient should she continue to have complaints of her right wrist to contact us in about 2 weeks after trying warm compressions and we will consider ultrasounding that area.  Also discussed with patient should her blood pressure remain elevated after 2 weeks of isosorbide to contact us and we will consider uptitrating the dose.  Follow-up in 1 month's time or sooner if needed.         ESTHER Smith         Dictated utilizing Dragon dictation

## 2024-01-10 ENCOUNTER — OFFICE VISIT (OUTPATIENT)
Dept: CARDIOLOGY | Facility: CLINIC | Age: 74
End: 2024-01-10
Payer: COMMERCIAL

## 2024-01-10 VITALS
SYSTOLIC BLOOD PRESSURE: 162 MMHG | DIASTOLIC BLOOD PRESSURE: 88 MMHG | HEIGHT: 60 IN | BODY MASS INDEX: 31.61 KG/M2 | OXYGEN SATURATION: 96 % | HEART RATE: 71 BPM | WEIGHT: 161 LBS

## 2024-01-10 DIAGNOSIS — I10 HYPERTENSION, UNSPECIFIED TYPE: ICD-10-CM

## 2024-01-10 DIAGNOSIS — I25.118 CORONARY ARTERY DISEASE INVOLVING NATIVE CORONARY ARTERY OF NATIVE HEART WITH OTHER FORM OF ANGINA PECTORIS: Primary | ICD-10-CM

## 2024-01-10 DIAGNOSIS — I49.5 SSS (SICK SINUS SYNDROME): ICD-10-CM

## 2024-01-10 RX ORDER — ISOSORBIDE MONONITRATE 30 MG/1
30 TABLET, EXTENDED RELEASE ORAL DAILY
Qty: 30 TABLET | Refills: 11 | Status: SHIPPED | OUTPATIENT
Start: 2024-01-10

## 2024-01-10 NOTE — LETTER
January 10, 2024       No Recipients    Patient: Ludy Stock   YOB: 1950   Date of Visit: 1/10/2024     Dear Roger Hopper MD:       Thank you for referring Ludy Stock to me for evaluation. Below are the relevant portions of my assessment and plan of care.    If you have questions, please do not hesitate to call me. I look forward to following Ludy along with you.         Sincerely,        ESTHER Smith        CC:   No Recipients    Elvi Meehan APRN  01/10/24 1057  Sign when Signing Visit  Subjective:     Encounter Date:01/10/2024    Primary Care Physician: Roger Hopper MD      Patient ID: Ludy Stock is a 73 y.o. female.    Chief Complaint:Coronary Artery Disease    PROBLEM LIST:   Symptomatic bradycardia:   Status post dual-chamber [employer] pacemaker, 01/04/2010.    Palpitations associated with syncopal episode in October 2009.  Event recorder, November 2009, revealing sinus bradycardia with heart rates as low as 38 (during waking hours).  3/2023 generator at [facility]  Coronary artery disease:  History of cardiac catheterizations, 2000 and 2003, and 2011.  Reported PCI at [facility], 2011.  Pharmacologic MPS, 03/10/2015: Normal perfusion.   Normal LVEF.  2009 3.5 x 32 stent to RCA performed in [facility]  12/2022 normal MPS (Dr. Samayoa's office)  12/6/2023 NSTEMI 90% distal RCA ISR (3 x 28 Xience).  20 to 30% LAD proximal, 20 to 30% first diagonal, proximal 20 to 30% RCA.  Pulmonary hypertension:  Secondary to Fen-Phen use.  Echocardiogram, 10/28/2009: Normal LVEF, RVSP 32 mmHg.  Echocardiogram,  03/10/2015:  Normal.  Hypertension  Dyslipidemia  Reported h/o Atrial fibrillation  On Eliquis, CHADSVASC 5  Right breast cancer  Treated with lumpectomy  Systemic lupus erythematosus.  Fibromyalgia.   History of peptic ulcer disease.  Crohn’s disease.  History of DVT in 1995.  PTSD.  History of parathyroid disease, status post parathyroidectomy.  Chronic  constipation  Nephrolithiasis  Hypothyroidism  Surgical history:  Partial colon resection x2.  Bilateral bunionectomy.  Tonsillectomy.  Hysterectomy.  Bladder resection.  Right carpal tunnel repair.  Benign  breast lumpectomy.  Bilateral breast implants  Cholecystectomy  Right rotator cuff repair      No Known Allergies      Current Outpatient Medications:   •  apixaban (ELIQUIS) 5 MG tablet tablet, Take 1 tablet by mouth 2 (Two) Times a Day., Disp: , Rfl:   •  aspirin 81 MG EC tablet, Take 1 tablet by mouth Daily for 30 days and then discontinue, Disp: 30 tablet, Rfl: 0  •  clopidogrel (PLAVIX) 75 MG tablet, Take 1 tablet by mouth Daily., Disp: 30 tablet, Rfl: 11  •  Coenzyme Q10 (CoQ-10) 100 MG capsule, Take 1 capsule by mouth Daily., Disp: , Rfl:   •  furosemide (LASIX) 80 MG tablet, Take 1 tablet by mouth As Needed (as directed)., Disp: , Rfl:   •  levothyroxine (SYNTHROID, LEVOTHROID) 125 MCG tablet, Take 175 mcg by mouth Daily., Disp: , Rfl:   •  linaclotide (LINZESS) 290 MCG capsule capsule, Take 1 capsule by mouth Every Morning Before Breakfast., Disp: , Rfl:   •  lisinopril (PRINIVIL,ZESTRIL) 20 MG tablet, Take 1 tablet by mouth Daily., Disp: 30 tablet, Rfl: 11  •  metoprolol succinate XL (TOPROL-XL) 25 MG 24 hr tablet, Take 0.5 (one-half) tablet by mouth Daily., Disp: 15 tablet, Rfl: 11  •  nitroglycerin (NITROSTAT) 0.4 MG SL tablet, Place 1 tablet under the tongue Every 5 (Five) Minutes As Needed for Chest Pain., Disp: , Rfl:   •  oxybutynin XL (DITROPAN-XL) 10 MG 24 hr tablet, Take 1 tablet by mouth Daily., Disp: , Rfl:   •  oxyCODONE-acetaminophen (PERCOCET)  MG per tablet, Take 1 tablet by mouth Every 6 (Six) Hours As Needed for Moderate Pain., Disp: , Rfl:   •  rosuvastatin (CRESTOR) 20 MG tablet, Take 1 tablet by mouth Every Night., Disp: 30 tablet, Rfl: 11    Current Facility-Administered Medications:   •  albuterol (PROVENTIL) nebulizer solution 0.083% 2.5 mg/3mL, 2.5 mg, Nebulization, Once,  "Tejal Sands, APRN        History of Present Illness    Patient is a 73-year-old  female who returns today for hospital follow-up status post recent admission with NSTEMI and subsequent distal RCA stenting.  Since being discharged she notes overall feeling poorly.  She notes that she is currently caring for her  who has cancer.  She has had some intermittent chest pain since discharge.  This has not been consistent.  But is described as a significant pressure sensation in her chest.  Also notes that her blood pressure has been significantly elevated since discharge.  She notes that she is taken aspirin for her chest pain and typically this alleviates the symptoms she has not had to use sublingual nitroglycerin.  No reported syncope or presyncope.  Does note some positional dizziness.  Complains of easy bruising.    The following portions of the patient's history were reviewed and updated as appropriate: allergies, current medications, past family history, past medical history, past social history, past surgical history and problem list.    Social History     Tobacco Use   • Smoking status: Never   • Smokeless tobacco: Never   Vaping Use   • Vaping Use: Never used   Substance Use Topics   • Alcohol use: No   • Drug use: Never         ROS       Objective:   /88   Pulse 71   Ht 152.4 cm (60\")   Wt 73 kg (161 lb)   SpO2 96%   BMI 31.44 kg/m²         Vitals reviewed.   Constitutional:       Appearance: Well-developed and not in distress.   Neck:      Vascular: No JVD.      Trachea: No tracheal deviation.   Pulmonary:      Effort: Pulmonary effort is normal.      Breath sounds: Normal breath sounds.   Cardiovascular:      Normal rate. Regular rhythm.      Murmurs: There is no murmur.      Comments: Right radial site with noted healing bruise.  Possible mild healing hematoma.  Edema:     Peripheral edema absent.   Musculoskeletal:         General: No deformity. Neurological:      Mental Status: " Alert and oriented to person, place, and time.         Procedures  Device check:  Normal functioning dual-chamber pacemaker.  7 mode switch episodes most recent 12/4/2023.  Battery voltage 3.01 V 8.4 to 8.7 years.  Atrially paced 86%.  Rate of 70.  No changes made.  Currently programmed AAIR.        Assessment:   Assessment & Plan     Diagnoses and all orders for this visit:    1. Coronary artery disease involving native coronary artery of native heart with other form of angina pectoris (Primary), some recurrent chest pain.  Suspect this is related to uncontrolled blood pressure and stress.  Currently on aspirin and Plavix.    2. SSS (sick sinus syndrome), normal functioning device.  Some mode switching noted.  Chronically anticoagulated with Eliquis.    3. Hypertension, unspecified type, uncontrolled.  Reported high readings at home.      Plan:  Will add isosorbide 30 mg daily to patient's medication regimen.  As patient is 30 days post intervention we will discontinue aspirin and maintain Plavix and Eliquis.  Check CBC, CMP, TSH to further evaluate patient's fatigue.  Continue other current cardiac medications.  Discussed with patient should she continue to have complaints of her right wrist to contact us in about 2 weeks after trying warm compressions and we will consider ultrasounding that area.  Also discussed with patient should her blood pressure remain elevated after 2 weeks of isosorbide to contact us and we will consider uptitrating the dose.  Follow-up in 1 month's time or sooner if needed.         ESTHER Smith         Dictated utilizing Dragon dictation

## 2024-02-10 PROCEDURE — 93294 REM INTERROG EVL PM/LDLS PM: CPT | Performed by: INTERNAL MEDICINE

## 2024-02-10 PROCEDURE — 93296 REM INTERROG EVL PM/IDS: CPT | Performed by: INTERNAL MEDICINE

## 2024-02-27 NOTE — PROGRESS NOTES
Subjective:     Encounter Date:02/28/2024    Primary Care Physician: Roger Hopper MD      Patient ID: Ludy Stock is a 73 y.o. female.    Chief Complaint:Coronary Artery Disease      PROBLEM LIST:   Symptomatic bradycardia:   Status post dual-chamber [employer] pacemaker, 01/04/2010.    Palpitations associated with syncopal episode in October 2009.  Event recorder, November 2009, revealing sinus bradycardia with heart rates as low as 38 (during waking hours).  3/2023 generator at [facility]  Coronary artery disease:  History of cardiac catheterizations, 2000 and 2003, and 2011.  Reported PCI at [facility], 2011.  Pharmacologic MPS, 03/10/2015: Normal perfusion.   Normal LVEF.  2009 3.5 x 32 stent to RCA performed in [facility]  12/2022 normal MPS (Dr. Samayoa's office)  12/6/2023 NSTEMI 90% distal RCA ISR (3 x 28 Xience).  20 to 30% LAD proximal, 20 to 30% first diagonal, proximal 20 to 30% RCA.  Pulmonary hypertension:  Secondary to Fen-Phen use.  Echocardiogram, 10/28/2009: Normal LVEF, RVSP 32 mmHg.  Echocardiogram,  03/10/2015:  Normal.  Hypertension  Dyslipidemia  Reported h/o Atrial fibrillation  On Eliquis, CHADSVASC 5  Right breast cancer  Treated with lumpectomy  Systemic lupus erythematosus.  Fibromyalgia.   History of peptic ulcer disease.  Crohn’s disease.  History of DVT in 1995.  PTSD.  History of parathyroid disease, status post parathyroidectomy.  Chronic constipation  Nephrolithiasis  Hypothyroidism  Surgical history:  Partial colon resection x2.  Bilateral bunionectomy.  Tonsillectomy.  Hysterectomy.  Bladder resection.  Right carpal tunnel repair.  Benign  breast lumpectomy.  Bilateral breast implants  Cholecystectomy  Right rotator cuff repair      No Known Allergies      Current Outpatient Medications:     amoxicillin-clavulanate (AUGMENTIN) 875-125 MG per tablet, Take 1 tablet by mouth Every 12 (Twelve) Hours., Disp: , Rfl:     apixaban (ELIQUIS) 5 MG tablet tablet, Take 1  tablet by mouth 2 (Two) Times a Day., Disp: , Rfl:     Aspirin 81 MG capsule, Take 1 capsule by mouth Daily., Disp: , Rfl:     clopidogrel (PLAVIX) 75 MG tablet, Take 1 tablet by mouth Daily., Disp: 30 tablet, Rfl: 11    Coenzyme Q10 (CoQ-10) 100 MG capsule, Take 1 capsule by mouth Daily., Disp: , Rfl:     furosemide (LASIX) 80 MG tablet, Take 1 tablet by mouth As Needed (as directed)., Disp: , Rfl:     isosorbide mononitrate (IMDUR) 30 MG 24 hr tablet, Take 1 tablet by mouth Daily., Disp: 30 tablet, Rfl: 11    levothyroxine (SYNTHROID, LEVOTHROID) 125 MCG tablet, Take 175 mcg by mouth Daily., Disp: , Rfl:     linaclotide (LINZESS) 290 MCG capsule capsule, Take 1 capsule by mouth Every Morning Before Breakfast., Disp: , Rfl:     lisinopril (PRINIVIL,ZESTRIL) 20 MG tablet, Take 1 tablet by mouth Daily., Disp: 30 tablet, Rfl: 11    metoprolol succinate XL (TOPROL-XL) 25 MG 24 hr tablet, Take 0.5 (one-half) tablet by mouth Daily., Disp: 15 tablet, Rfl: 11    nitroglycerin (NITROSTAT) 0.4 MG SL tablet, Place 1 tablet under the tongue Every 5 (Five) Minutes As Needed for Chest Pain., Disp: , Rfl:     oxybutynin XL (DITROPAN-XL) 10 MG 24 hr tablet, Take 1 tablet by mouth Daily., Disp: , Rfl:     oxyCODONE-acetaminophen (PERCOCET)  MG per tablet, Take 1 tablet by mouth Every 6 (Six) Hours As Needed for Moderate Pain., Disp: , Rfl:     rosuvastatin (CRESTOR) 20 MG tablet, Take 1 tablet by mouth Every Night., Disp: 30 tablet, Rfl: 11    Current Facility-Administered Medications:     albuterol (PROVENTIL) nebulizer solution 0.083% 2.5 mg/3mL, 2.5 mg, Nebulization, Once, Tejal Sands, RN        History of Present Illness    Patient returns today for follow-up of hypertension coronary disease with her last visit, she overall is unchanged at the last 4 to 5 days ago when she had onset of significant GI symptoms complaining of nausea vomiting constipation and other GI issues.  Blood pressure has been running slightly  "elevated before this.  No exertional dyspnea.  Rare atypical chest pain.  No dizziness or presyncope.    The following portions of the patient's history were reviewed and updated as appropriate: allergies, current medications, past family history, past medical history, past social history, past surgical history and problem list.      Social History     Tobacco Use    Smoking status: Never    Smokeless tobacco: Never   Vaping Use    Vaping Use: Never used   Substance Use Topics    Alcohol use: No    Drug use: Never         ROS       Objective:   /81   Pulse 83   Ht 152.4 cm (60\")   Wt 76.3 kg (168 lb 3.2 oz)   SpO2 96%   BMI 32.85 kg/m²         Vitals reviewed.   Constitutional:       Appearance: Well-developed and not in distress.   Neck:      Thyroid: No thyromegaly.      Vascular: No carotid bruit or JVD.   Pulmonary:      Breath sounds: Normal breath sounds.   Cardiovascular:      Regular rhythm.      No gallop. No S3 and S4 gallop.   Pulses:     Intact distal pulses.      Carotid: 2+ bilaterally.     Radial: 2+ bilaterally.  Edema:     Peripheral edema absent.   Abdominal:      General: Bowel sounds are normal.      Palpations: Abdomen is soft. There is no abdominal mass.      Tenderness: There is no abdominal tenderness.   Musculoskeletal:         General: No deformity.      Extremities: No clubbing present.Skin:     General: Skin is warm and dry.      Findings: No rash.   Neurological:      Mental Status: Alert and oriented to person, place, and time.         Procedures          Assessment:   Assessment & Plan      Diagnoses and all orders for this visit:    1. Coronary artery disease involving native coronary artery of native heart with other form of angina pectoris (Primary)      1.  Coronary artery disease status post recent PCI.  No angina  2.  Bradycardia normal functioning pacemaker  3.  Hypertension, suboptimally controlled currently  4.  Dyslipidemia on high intensity " statin    Recommendations:  1.  Patient will have CBC CMP TSH checked through primary physician's office, this has not been done yet.  2.  Continue Plavix and Eliquis for total 6 months and then will discontinue clopidogrel and resume aspirin.  3.  Increase metoprolol to 25 mg daily for hypertension  4.  Revisit in 6 months with a device check.         Advance Care Planning   ACP discussion was held with the patient during this visit. Patient has an advance directive in EMR which is still valid.       Jake Rachel MD    Dictated utilizing Dragon dictation

## 2024-02-28 ENCOUNTER — OFFICE VISIT (OUTPATIENT)
Dept: CARDIOLOGY | Facility: CLINIC | Age: 74
End: 2024-02-28
Payer: COMMERCIAL

## 2024-02-28 VITALS
HEART RATE: 83 BPM | HEIGHT: 60 IN | WEIGHT: 168.2 LBS | DIASTOLIC BLOOD PRESSURE: 81 MMHG | BODY MASS INDEX: 33.02 KG/M2 | OXYGEN SATURATION: 96 % | SYSTOLIC BLOOD PRESSURE: 151 MMHG

## 2024-02-28 DIAGNOSIS — I25.118 CORONARY ARTERY DISEASE INVOLVING NATIVE CORONARY ARTERY OF NATIVE HEART WITH OTHER FORM OF ANGINA PECTORIS: Primary | ICD-10-CM

## 2024-02-28 PROCEDURE — 1159F MED LIST DOCD IN RCRD: CPT | Performed by: INTERNAL MEDICINE

## 2024-02-28 PROCEDURE — 1160F RVW MEDS BY RX/DR IN RCRD: CPT | Performed by: INTERNAL MEDICINE

## 2024-02-28 PROCEDURE — 99214 OFFICE O/P EST MOD 30 MIN: CPT | Performed by: INTERNAL MEDICINE

## 2024-02-28 RX ORDER — ROSUVASTATIN CALCIUM 20 MG/1
20 TABLET, COATED ORAL NIGHTLY
Qty: 30 TABLET | Refills: 11 | Status: SHIPPED | OUTPATIENT
Start: 2024-02-28

## 2024-02-28 RX ORDER — AMOXICILLIN AND CLAVULANATE POTASSIUM 875; 125 MG/1; MG/1
1 TABLET, FILM COATED ORAL EVERY 12 HOURS SCHEDULED
COMMUNITY
Start: 2024-02-16

## 2024-02-28 RX ORDER — METOPROLOL SUCCINATE 25 MG/1
25 TABLET, EXTENDED RELEASE ORAL DAILY
Qty: 30 TABLET | Refills: 11 | Status: SHIPPED | OUTPATIENT
Start: 2024-02-28

## 2024-06-04 ENCOUNTER — TELEPHONE (OUTPATIENT)
Dept: CARDIOLOGY | Facility: CLINIC | Age: 74
End: 2024-06-04
Payer: MEDICARE

## 2024-06-04 NOTE — TELEPHONE ENCOUNTER
I called Mrs Stock to have her send a manual reading due to not receiving the scheduled one. She has been at the hospital with her  whom is on life support. She will call me when she is able to return home.

## 2024-07-13 ENCOUNTER — APPOINTMENT (OUTPATIENT)
Dept: GENERAL RADIOLOGY | Facility: HOSPITAL | Age: 74
End: 2024-07-13
Payer: MEDICARE

## 2024-07-13 ENCOUNTER — HOSPITAL ENCOUNTER (EMERGENCY)
Facility: HOSPITAL | Age: 74
Discharge: HOME | End: 2024-07-13
Attending: EMERGENCY MEDICINE
Payer: MEDICARE

## 2024-07-13 VITALS
BODY MASS INDEX: 31.41 KG/M2 | TEMPERATURE: 98 F | WEIGHT: 160 LBS | DIASTOLIC BLOOD PRESSURE: 83 MMHG | OXYGEN SATURATION: 98 % | SYSTOLIC BLOOD PRESSURE: 159 MMHG | HEART RATE: 70 BPM | RESPIRATION RATE: 16 BRPM | HEIGHT: 60 IN

## 2024-07-13 DIAGNOSIS — S70.02XA CONTUSION OF LEFT HIP, INITIAL ENCOUNTER: Primary | ICD-10-CM

## 2024-07-13 PROCEDURE — 73502 X-RAY EXAM HIP UNI 2-3 VIEWS: CPT

## 2024-07-13 PROCEDURE — 99283 EMERGENCY DEPT VISIT LOW MDM: CPT

## 2024-07-13 RX ORDER — OXYCODONE AND ACETAMINOPHEN 10; 325 MG/1; MG/1
1 TABLET ORAL ONCE
Status: COMPLETED | OUTPATIENT
Start: 2024-07-13 | End: 2024-07-13

## 2024-07-13 RX ADMIN — OXYCODONE HYDROCHLORIDE AND ACETAMINOPHEN 1 TABLET: 10; 325 TABLET ORAL at 13:59

## 2024-07-13 NOTE — ED PROVIDER NOTES
Subjective   History of Present Illness  73-year-old female presents emergency department day with pain in the left buttock.  She reports yesterday her  fell and injured his falling, pulled her down on top of him.  She states that she has some pain she is able to bear weight but has pain in the left buttock.  She is on Eliquis.  She reports she did not hit her head.  She does not have a large bruise to the area.  She is able to bear weight but is tender.  She is on chronic narcotics takes Percocet tens 3 times a day.  Has no other complaints.  Her  currently in the ICU she is should come down to get this x-ray just to make sure she did not have a fracture.    History provided by:  Patient   used: No    Hip Pain  Location:  Left buttock left hip  Quality:  Dull achy  Severity:  Moderate  Onset quality:  Gradual  Duration:  1 day  Timing:  Constant  Progression:  Unchanged  Chronicity:  New  Context:  Fell yesterday  Relieved by:  Rest  Worsened by:  Movement  Associated symptoms: no abdominal pain, no congestion, no cough, no diarrhea, no ear pain, no fever, no headaches, no loss of consciousness, no rhinorrhea, no shortness of breath, no sore throat and no vomiting        Review of Systems   Constitutional:  Negative for fever.   HENT:  Negative for congestion, ear pain, rhinorrhea and sore throat.    Respiratory:  Negative for cough and shortness of breath.    Gastrointestinal:  Negative for abdominal pain, diarrhea and vomiting.   Neurological:  Negative for loss of consciousness and headaches.       Past Medical History:   Diagnosis Date    Coronary artery disease     Crohn's disease, small and large intestine     DVT (deep venous thrombosis) 1995    Fibromyalgia     Heart disease     Hypertension     Parathyroid disease     status post parathyroidectomy.    Peptic ulcer disease     PONV (postoperative nausea and vomiting)     PTSD (post-traumatic stress disorder)     Pulmonary  hypertension     Symptomatic bradycardia     Systemic lupus erythematosus        No Known Allergies    Past Surgical History:   Procedure Laterality Date    BLADDER RESECTION LAPAROSCOPIC      BREAST LUMPECTOMY      BUNIONECTOMY      BILATERAL     CARDIAC CATHETERIZATION N/A 12/6/2023    Procedure: Coronary angiography;  Surgeon: German Kelley MD;  Location:  [facility];  Service: Cardiovascular;  Laterality: N/A;    CARDIAC CATHETERIZATION N/A 12/6/2023    Procedure: Stent ISRAEL coronary;  Surgeon: German Kelley MD;  Location:  [facility];  Service: Cardiovascular;  Laterality: N/A;    CARDIAC CATHETERIZATION N/A 12/6/2023    Procedure: Optical Coherence Tomography;  Surgeon: German Kelley MD;  Location:  [facility];  Service: Cardiovascular;  Laterality: N/A;    CARPAL TUNNEL RELEASE Right     COLON RESECTION      Partial x2    COSMETIC SURGERY      Bilateral breast implants    HYSTERECTOMY      ICD GENERATOR REPLACEMENT N/A 3/30/2023    Procedure: ICD DC generator change, possible lead revision;  Surgeon: Paul Polo DO;  Location:  [facility];  Service: Cardiology;  Laterality: N/A;    TONSILLECTOMY         Family History   Problem Relation Age of Onset    Heart attack Father        Social History     Socioeconomic History    Marital status:    Tobacco Use    Smoking status: Never    Smokeless tobacco: Never   Vaping Use    Vaping status: Never Used   Substance and Sexual Activity    Alcohol use: No    Drug use: Never    Sexual activity: Defer           Objective   Physical Exam  Vitals and nursing note reviewed.   Constitutional:       General: She is not in acute distress.     Appearance: She is well-developed. She is not diaphoretic.   HENT:      Head: Normocephalic and atraumatic.      Nose: Nose normal.   Eyes:      General: No scleral icterus.     Conjunctiva/sclera: Conjunctivae normal.   Pulmonary:      Effort:  Pulmonary effort is normal. No respiratory distress.   Abdominal:      Palpations: Abdomen is soft.   Musculoskeletal:         General: Normal range of motion.      Cervical back: Normal range of motion and neck supple.      Comments: Diffuse tenderness in the left buttock.  She is sitting in the chair with her legs crossed.  Posterior tib ostracized pedis pulses are palpable she is not shortened or externally rotated.  No tenderness over the greater trochanter.  No tenderness of the lumbar spine.   Skin:     General: Skin is warm and dry.   Neurological:      Mental Status: She is alert and oriented to person, place, and time.   Psychiatric:         Behavior: Behavior normal.         Procedures           ED Course  ED Course as of 07/13/24 1409   Sat Jul 13, 2024   1409 X-rays negative for fracture dislocation.  She is on chronic narcotics will be given an extra dose of medication here.  Should be taken back up to the ICU her  currently is.  She is able to bear weight and has negative x-rays for fracture dislocation. [ELZBIETA]      ED Course User Index  [ELZBIETA] Sunil Humphrey PA                                   No results found for this or any previous visit (from the past 24 hour(s)).  Note: In addition to lab results from this visit, the labs listed above may include labs taken at another facility or during a different encounter within the last 24 hours. Please correlate lab times with ED admission and discharge times for further clarification of the services performed during this visit.    XR Hip With or Without Pelvis 2 - 3 View Left   Final Result   Impression:   Mild degenerative arthrosis         Electronically Signed: Raz Barber MD     7/13/2024 1:13 PM EDT     Workstation ID: [id number]        Vitals:    07/13/24 1232   BP: 159/83   BP Location: Left arm   Patient Position: Sitting   Pulse: 70   Resp: 16   Temp: 98 °F (36.7 °C)   TempSrc: Oral   SpO2: 98%   Weight: 72.6 kg (160 lb)   Height: 152.4  "cm (60\")     Medications   oxyCODONE-acetaminophen (PERCOCET)  MG per tablet 1 tablet (1 tablet Oral Given 7/13/24 1359)     ECG/EMG Results (last 24 hours)       ** No results found for the last 24 hours. **          No orders to display                 Medical Decision Making  Problems Addressed:  Contusion of left hip, initial encounter: complicated acute illness or injury    Amount and/or Complexity of Data Reviewed  Radiology: ordered.    Risk  Prescription drug management.        Final diagnoses:   Contusion of left hip, initial encounter       ED Disposition  ED Disposition       ED Disposition   Discharge    Condition   Stable    Comment   --               Roger Hopper MD  [address]  [place] [address]  [phone]               Medication List      No changes were made to your prescriptions during this visit.            Sunil Humphrey PA  07/14/24 1738    "

## 2024-08-27 NOTE — PROGRESS NOTES
Subjective:     Encounter Date:08/28/2024    Primary Care Physician: Roger Hopper MD      Patient ID: Ludy Stock is a 73 y.o. female.    Chief Complaint:Coronary Artery Disease      PROBLEM LIST:   Symptomatic bradycardia:   Status post dual-chamber [employer] pacemaker, 01/04/2010.    Palpitations associated with syncopal episode in October 2009.  Event recorder, November 2009, revealing sinus bradycardia with heart rates as low as 38 (during waking hours).  3/2023 generator at [facility]  Coronary artery disease:  History of cardiac catheterizations, 2000 and 2003, and 2011.  Reported PCI at [facility], 2011.  Pharmacologic MPS, 03/10/2015: Normal perfusion.   Normal LVEF.  2009 3.5 x 32 stent to RCA performed in [facility]  12/2022 normal MPS (Dr. Samayoa's office)  12/6/2023 NSTEMI 90% distal RCA ISR (3 x 28 Xience).  20 to 30% LAD proximal, 20 to 30% first diagonal, proximal 20 to 30% RCA.  Pulmonary hypertension:  Secondary to Fen-Phen use.  Echocardiogram, 10/28/2009: Normal LVEF, RVSP 32 mmHg.  Echocardiogram,  03/10/2015:  Normal.  Hypertension  Dyslipidemia  Reported h/o Atrial fibrillation  On Eliquis, CHADSVASC 5  Brief asymptomatic noted on device check.  Right breast cancer  Treated with lumpectomy  Systemic lupus erythematosus.  Fibromyalgia.   History of peptic ulcer disease.  Crohn’s disease.  History of DVT in 1995.  PTSD.  History of parathyroid disease, status post parathyroidectomy.  Chronic constipation  Nephrolithiasis  Hypothyroidism  Surgical history:  Partial colon resection x2.  Bilateral bunionectomy.  Tonsillectomy.  Hysterectomy.  Bladder resection.  Right carpal tunnel repair.  Benign  breast lumpectomy.  Bilateral breast implants  Cholecystectomy  Right rotator cuff repair        No Known Allergies      Current Outpatient Medications:     amoxicillin-clavulanate (AUGMENTIN) 875-125 MG per tablet, Take 1 tablet by mouth Every 12 (Twelve) Hours., Disp: , Rfl:      apixaban (ELIQUIS) 5 MG tablet tablet, Take 1 tablet by mouth 2 (Two) Times a Day., Disp: 60 tablet, Rfl: 11    Aspirin 81 MG capsule, Take 1 capsule by mouth Daily., Disp: , Rfl:     clopidogrel (PLAVIX) 75 MG tablet, Take 1 tablet by mouth Daily., Disp: 30 tablet, Rfl: 11    Coenzyme Q10 (CoQ-10) 100 MG capsule, Take 1 capsule by mouth Daily., Disp: , Rfl:     furosemide (LASIX) 80 MG tablet, Take 1 tablet by mouth As Needed (as directed)., Disp: , Rfl:     isosorbide mononitrate (IMDUR) 30 MG 24 hr tablet, Take 1 tablet by mouth Daily., Disp: 30 tablet, Rfl: 11    levothyroxine (SYNTHROID, LEVOTHROID) 125 MCG tablet, Take 175 mcg by mouth Daily., Disp: , Rfl:     linaclotide (LINZESS) 290 MCG capsule capsule, Take 1 capsule by mouth Every Morning Before Breakfast., Disp: , Rfl:     lisinopril (PRINIVIL,ZESTRIL) 20 MG tablet, Take 1 tablet by mouth Daily., Disp: 30 tablet, Rfl: 11    metoprolol succinate XL (TOPROL-XL) 25 MG 24 hr tablet, Take 1 tablet by mouth Daily., Disp: 30 tablet, Rfl: 11    nitroglycerin (NITROSTAT) 0.4 MG SL tablet, Place 1 tablet under the tongue Every 5 (Five) Minutes As Needed for Chest Pain., Disp: , Rfl:     omeprazole (priLOSEC) 40 MG capsule, Take 1 capsule by mouth Daily., Disp: , Rfl:     oxybutynin XL (DITROPAN-XL) 10 MG 24 hr tablet, Take 1 tablet by mouth Daily., Disp: , Rfl:     oxyCODONE-acetaminophen (PERCOCET)  MG per tablet, Take 1 tablet by mouth Every 6 (Six) Hours As Needed for Moderate Pain., Disp: , Rfl:     rosuvastatin (CRESTOR) 20 MG tablet, Take 1 tablet by mouth Every Night., Disp: 30 tablet, Rfl: 11    Current Facility-Administered Medications:     albuterol (PROVENTIL) nebulizer solution 0.083% 2.5 mg/3mL, 2.5 mg, Nebulization, Once, Tejal Sands RN        History of Present Illness    Patient returns today for routine follow-up of sick sinus syndrome, pacemaker, and coronary artery disease.  Since her last visit she returns with with primary complaint  "being of severe malaise fatigue, and multiple nonischemic symptoms.  She notes several months of difficulty swallowing, including water and other liquids which in the past has required her to have esophageal dilatation per her.  She has a patent to see Dr. Edwards for this upcoming.  She had 1 episode of chest discomfort, 2 nights ago, that awakened her from sleep and radiated to her jaw.  Responded to 1 sublingual nitroglycerin.  Notes shortness of air with exertion which is chronic    The following portions of the patient's history were reviewed and updated as appropriate: allergies, current medications, past family history, past medical history, past social history, past surgical history and problem list.      Social History     Tobacco Use    Smoking status: Never    Smokeless tobacco: Never   Vaping Use    Vaping status: Never Used   Substance Use Topics    Alcohol use: No    Drug use: Never         ROS       Objective:   /84   Pulse 70   Ht 152.4 cm (60\")   Wt 72.6 kg (160 lb)   SpO2 97%   BMI 31.25 kg/m²         Vitals reviewed.   Constitutional:       Appearance: Well-developed and not in distress.   Neck:      Thyroid: No thyromegaly.      Vascular: No carotid bruit or JVD.   Pulmonary:      Breath sounds: Normal breath sounds.   Cardiovascular:      Regular rhythm.      No gallop. No S3 and S4 gallop.   Pulses:     Intact distal pulses.      Carotid: 2+ bilaterally.     Radial: 2+ bilaterally.  Edema:     Peripheral edema absent.   Abdominal:      General: Bowel sounds are normal.      Palpations: Abdomen is soft. There is no abdominal mass.      Tenderness: There is no abdominal tenderness.   Musculoskeletal:         General: No deformity.      Extremities: No clubbing present.Skin:     General: Skin is warm and dry.      Findings: No rash.   Neurological:      Mental Status: Alert and oriented to person, place, and time.         Procedures  Device check:  92% atrially paced, no ventricular " pacing.  AAIR pacemaker with normal thresholds impedances.  Estimated 8 years of battery life.  Several episodes of mode switching, less than 1% total, with maximal duration of 3.5 hours.  Atrial sensor slope was changed to auto to help with her exertional malaise fatigue given her flat histograms.        Assessment:   Assessment & Plan      Diagnoses and all orders for this visit:    1. Coronary artery disease involving native coronary artery of native heart with other form of angina pectoris (Primary)    2. SSS (sick sinus syndrome)      1.  Coronary artery disease, 8-month status post PCI.  No angina.  Single episode of nocturnal rest pain.  Exertional dyspnea unchanged.  2.  Sick sinus syndrome with normal functioning atrial pacemaker.  3.  Paroxysmal atrial fibrillation, asymptomatic, noted on device check..  On Eliquis, for [id number] of 5   4..  Hypertension, elevated today.  5.  Dyslipidemia on high intensity statin    Recommendations:  1.  Continue current medical therapy  2.  Patient to see Dr. Edwards for esophageal dilatation.  3.  Will schedule MPS after esophageal dilatation, if her symptoms do not resolve.     Jake Rachel MD      Advance Care Planning   ACP discussion was held with the patient during this visit. Patient has an advance directive (not in EMR), copy requested.        Dictated utilizing Dragon dictation

## 2024-08-28 ENCOUNTER — OFFICE VISIT (OUTPATIENT)
Dept: CARDIOLOGY | Facility: CLINIC | Age: 74
End: 2024-08-28
Payer: MEDICARE

## 2024-08-28 VITALS
OXYGEN SATURATION: 97 % | HEIGHT: 60 IN | BODY MASS INDEX: 31.41 KG/M2 | HEART RATE: 70 BPM | DIASTOLIC BLOOD PRESSURE: 84 MMHG | SYSTOLIC BLOOD PRESSURE: 151 MMHG | WEIGHT: 160 LBS

## 2024-08-28 DIAGNOSIS — I49.5 SSS (SICK SINUS SYNDROME): ICD-10-CM

## 2024-08-28 DIAGNOSIS — I25.118 CORONARY ARTERY DISEASE INVOLVING NATIVE CORONARY ARTERY OF NATIVE HEART WITH OTHER FORM OF ANGINA PECTORIS: Primary | ICD-10-CM

## 2024-08-28 RX ORDER — OMEPRAZOLE 40 MG/1
1 CAPSULE, DELAYED RELEASE ORAL DAILY
COMMUNITY

## 2024-09-20 PROCEDURE — 93294 REM INTERROG EVL PM/LDLS PM: CPT | Performed by: INTERNAL MEDICINE

## 2024-09-20 PROCEDURE — 93296 REM INTERROG EVL PM/IDS: CPT | Performed by: INTERNAL MEDICINE

## 2024-09-25 ENCOUNTER — TELEPHONE (OUTPATIENT)
Dept: CARDIOLOGY | Facility: CLINIC | Age: 74
End: 2024-09-25
Payer: MEDICARE

## 2024-10-21 ENCOUNTER — TELEPHONE (OUTPATIENT)
Dept: CARDIOLOGY | Facility: CLINIC | Age: 74
End: 2024-10-21

## 2024-10-21 NOTE — TELEPHONE ENCOUNTER
Caller: Ludy Stock    Relationship to patient: Self    Best call back number: [phone]       Type of visit: F/U APPT    Requested date: NEXT AVAILABLE        Additional notes:PATIENT WAS RECENTLY RELEASED FROM [facility] AND WAS ADVISED TO SEE DR CLOUD AS SOON AS POSSIBLE. PLEASE CONTACT PATIENT IN REGARDS TO THIS REQUEST

## 2024-10-28 ENCOUNTER — OFFICE VISIT (OUTPATIENT)
Dept: CARDIOLOGY | Facility: CLINIC | Age: 74
End: 2024-10-28
Payer: MEDICARE

## 2024-10-28 VITALS
DIASTOLIC BLOOD PRESSURE: 72 MMHG | WEIGHT: 164.6 LBS | OXYGEN SATURATION: 98 % | BODY MASS INDEX: 32.31 KG/M2 | HEIGHT: 60 IN | SYSTOLIC BLOOD PRESSURE: 122 MMHG | HEART RATE: 70 BPM

## 2024-10-28 DIAGNOSIS — Z79.01 CHRONIC ANTICOAGULATION: ICD-10-CM

## 2024-10-28 DIAGNOSIS — Z95.0 PRESENCE OF CARDIAC PACEMAKER: ICD-10-CM

## 2024-10-28 DIAGNOSIS — I48.0 PAF (PAROXYSMAL ATRIAL FIBRILLATION): ICD-10-CM

## 2024-10-28 DIAGNOSIS — I49.5 SSS (SICK SINUS SYNDROME): Primary | ICD-10-CM

## 2024-10-28 PROBLEM — R07.9 CHEST PAIN: Status: RESOLVED | Noted: 2023-12-06 | Resolved: 2024-10-28

## 2024-10-28 PROBLEM — I21.4 NSTEMI, INITIAL EPISODE OF CARE: Status: RESOLVED | Noted: 2023-12-05 | Resolved: 2024-10-28

## 2024-10-28 PROBLEM — E66.9 OBESE: Status: ACTIVE | Noted: 2024-10-28

## 2024-10-28 PROBLEM — E11.9 DM2 (DIABETES MELLITUS, TYPE 2): Status: ACTIVE | Noted: 2024-10-28

## 2024-10-28 PROBLEM — Z45.010 PACEMAKER AT END OF BATTERY LIFE: Status: RESOLVED | Noted: 2023-03-23 | Resolved: 2024-10-28

## 2024-10-28 PROBLEM — I21.4 NSTEMI (NON-ST ELEVATED MYOCARDIAL INFARCTION): Status: RESOLVED | Noted: 2023-12-06 | Resolved: 2024-10-28

## 2024-10-28 PROCEDURE — 99214 OFFICE O/P EST MOD 30 MIN: CPT | Performed by: PHYSICIAN ASSISTANT

## 2024-10-28 PROCEDURE — 93279 PRGRMG DEV EVAL PM/LDLS PM: CPT | Performed by: PHYSICIAN ASSISTANT

## 2024-10-28 RX ORDER — POTASSIUM CHLORIDE 750 MG/1
1 TABLET, EXTENDED RELEASE ORAL 2 TIMES DAILY
COMMUNITY

## 2024-10-28 RX ORDER — ACETAMINOPHEN 325 MG/1
325 TABLET ORAL EVERY 6 HOURS PRN
COMMUNITY

## 2024-10-28 RX ORDER — IBUPROFEN 600 MG/1
TABLET, FILM COATED ORAL AS NEEDED
COMMUNITY

## 2024-10-28 RX ORDER — AMLODIPINE BESYLATE 5 MG/1
1 TABLET ORAL DAILY
COMMUNITY

## 2024-10-28 RX ORDER — SIMVASTATIN 20 MG
1 TABLET ORAL DAILY
COMMUNITY
Start: 2024-10-18

## 2024-10-28 RX ORDER — PSEUDOEPHEDRINE HCL 30 MG
1 TABLET ORAL DAILY
COMMUNITY

## 2024-10-28 RX ORDER — METHENAMINE HIPPURATE 1000 MG/1
0.5 TABLET ORAL 2 TIMES DAILY
COMMUNITY

## 2024-10-28 NOTE — PROGRESS NOTES
Encounter Date:10/28/2024      Patient ID: Ludy Stock is a 74 y.o. female.    Roger Hopper MD    Cheif Complaint EP: Atrial Fibrillation, Sinus node dysfunction, and Pacemaker check    PROBLEM LIST:  Patient Active Problem List    Diagnosis Date Noted    PAF (paroxysmal atrial fibrillation) 12/06/2023     Priority: High    SSS (sick sinus syndrome) 03/23/2023     Priority: High     Note Last Updated: 10/28/2024     Status post dual-chamber [employer] pacemaker, 01/04/2010.    Palpitations associated with syncopal episode in October 2009.  Event recorder, November 2009, revealing sinus bradycardia with heart rates as low as 38 (during waking hours).  Pacemaker generator change [employer] model PM 2272 pulse generator with serial number [id number].  3/30/2023      Presence of cardiac pacemaker 10/28/2024     Priority: Medium    Diastolic CHF, chronic 12/06/2023     Priority: Medium    Coronary artery disease      Priority: Medium     Note Last Updated: 10/28/2024     History of cardiac catheterizations, 2000 and 2003, and 2011.  Reported PCI at [facility], 2011.  Pharmacologic MPS, 03/10/2015: Normal perfusion.   Normal LVEF.  2009 3.5 x 32 stent to RCA performed in [facility]  12/2022 normal MPS (Dr. Samayoa's office)  12/6/2023 NSTEMI 90% distal RCA ISR (3 x 28 Xience).  20 to 30% LAD proximal, 20 to 30% first diagonal, proximal 20 to 30% RCA.      Chronic anticoagulation 10/28/2024     Priority: Low    Obese 10/28/2024    DM2 (diabetes mellitus, type 2) 10/28/2024    Type 1 myocardial infarction 12/08/2023    Pulmonary hypertension      Note Last Updated: 10/28/2024     Secondary to Fen-Phen use.  Echocardiogram, 10/28/2009: Normal LVEF, RVSP 32 mmHg.  Echocardiogram, 03/10/2015:  Normal.      Systemic lupus erythematosus     Fibromyalgia     Peptic ulcer disease     Crohn's disease, small and large intestine     PTSD (post-traumatic stress disorder)     DVT (deep venous thrombosis)  "01/01/1995             History of Present Illness  Patient presents today for follow-up with a history of Paroxysmal atrial fibrillation on long-term anticoagulation and sinus node dysfunction with a permanent pacemaker implantation.  She returns today earlier than expected for hospital follow-up after admission to [facility] for a \"TIA\".  She states that she had \"the worst headache of her life\".  She went to the emergency department in [place] where she was found to have accelerated hypertension with a systolic blood pressure greater than 200 her blood pressure improved and she was discharged home with instructions to follow-up with our service and to make an appointment with neurology.  She is currently trying to get an appointment with  neurology.  She states her blood pressures at home typically run about 140 mmHg systolic.  She has occasional awareness of palpitations which are self-limiting.  She states compliance with current medical regimen reports no significant for side effects.    Allergies   Allergen Reactions    Morphine Other (See Comments)       Current Outpatient Medications   Medication Instructions    acetaminophen (TYLENOL) 325 mg, Every 6 Hours PRN    amitriptyline (ELAVIL) 25 MG tablet 1 tablet, Nightly PRN    amLODIPine (NORVASC) 5 MG tablet 1 tablet, Daily    apixaban (ELIQUIS) 5 mg, Oral, 2 Times Daily    Aspirin 81 MG capsule 1 capsule, Daily    clopidogrel (PLAVIX) 75 mg, Oral, Daily    Coenzyme Q10 (CoQ-10) 100 MG capsule 1 capsule, Daily    docusate sodium 100 MG capsule 1 capsule, Daily    furosemide (LASIX) 80 mg, Oral, As Needed    ibuprofen (ADVIL,MOTRIN) 600 MG tablet As Needed    levothyroxine (SYNTHROID, LEVOTHROID) 175 mcg, Daily    linaclotide (LINZESS) 290 mcg, Every Morning Before Breakfast    lisinopril (PRINIVIL,ZESTRIL) 20 mg, Oral, Daily    metFORMIN (GLUCOPHAGE) 500 MG tablet 1 tablet, Daily    methenamine (HIPREX) 0.5 g, 2 Times Daily    metoprolol succinate " "XL (TOPROL-XL) 25 mg, Oral, Daily    naloxone (NARCAN) 4 MG/0.1ML nasal spray Take 1 spray by nasal route as directed.    nitroglycerin (NITROSTAT) 0.4 mg, Every 5 Minutes PRN    oxybutynin XL (DITROPAN-XL) 10 mg, Daily    oxyCODONE-acetaminophen (PERCOCET)  MG per tablet 1 tablet, Every 6 Hours PRN    potassium chloride 10 MEQ CR tablet 1 tablet, 2 Times Daily    rosuvastatin (CRESTOR) 20 mg, Oral, Nightly    simvastatin (ZOCOR) 20 MG tablet 1 tablet, Daily       .    Objective:     /72 (BP Location: Left arm, Patient Position: Sitting, Cuff Size: Adult)   Pulse 70   Ht 152.4 cm (60\")   Wt 74.7 kg (164 lb 9.6 oz)   SpO2 98%   BMI 32.15 kg/m²    Body mass index is 32.15 kg/m².     Vitals reviewed.   Constitutional:       Appearance: Well-developed.   Pulmonary:      Effort: Pulmonary effort is normal. No respiratory distress.      Breath sounds: Normal breath sounds. No wheezing. No rales.      Comments: Bases clear  Chest:      Chest wall: Not tender to palpatation.   Cardiovascular:      Normal rate. Regular rhythm.      Murmurs: There is no murmur.      No gallop.  No click. No rub.   Pulses:     Intact distal pulses.   Edema:     Peripheral edema absent.   Musculoskeletal: Normal range of motion.       Lab Review:     Lab Results   Component Value Date    GLUCOSE 97 12/08/2023    BUN 22 12/08/2023    CREATININE 1.13 (H) 12/08/2023    EGFR 51.2 (L) 12/08/2023    BCR 19.5 12/08/2023    K 4.5 12/08/2023    CO2 31.0 (H) 12/08/2023    CALCIUM 9.6 12/08/2023    ALBUMIN 3.7 12/05/2023    BILITOT 0.2 12/05/2023    AST 17 12/05/2023    ALT <5 12/05/2023     Lab Results   Component Value Date    WBC 4.25 01/01/2024    HGB 11.8 01/01/2024    HCT 35.3 01/01/2024    MCV 89 01/01/2024     01/01/2024     No results found for: \"TSH\"        Procedures               Assessment:      Diagnosis Plan   1. SSS (sick sinus syndrome)  Sinus node dysfunction with a dual-chamber permanent pacemaker currently " "pacing 94% in the right atrium.  Normal lead parameters, battery voltage 3.01 V.      2. PAF (paroxysmal atrial fibrillation)  2 episodes of atrial fibrillation (9/19/2024 and 9/24/2024) lasting 3.5 and 1.5 hours respectively continue current medical regimen      3. Presence of cardiac pacemaker    This patient's Cardiac Implanted Electronic Device was manually interrogated and reprogrammed during the patient encounter today.  Iterative programming changes were manually made to determine the sensing threshold, pacing threshold, lead impedance as well as underlying cardiac rhythm.  These programming changes were not limited to but included some or all of the following when appropriate: pacing mode, programmed AV delays, blanking periods, and refractory periods.  Data obtained as a result of these manual programing changes informed the patient's CIED permanent programming.        4. Chronic anticoagulation  Compliant with Eliquis 5 mg twice daily   5.     Recent \"TIA\".  With pending neurology evaluations.  On further investigation, she has a history of migraine headaches.  I think it is more probable that she had a migraine headache with neurologic symptoms.  She was compliant with her Eliquis at the time of the event.  Defer further evaluation to neurology.     Plan:     Advance Care Planning   ACP discussion was declined by the patient. Patient has an advance directive (not in EMR), copy requested.           Stable cardiac status.  Continue current medications.   in 6 months, sooner as needed.  Thank you for allowing us to participate in the care of your patient.     Electronically signed by JAIRO Matt, 10/28/24, 3:18 PM EDT.    "

## 2024-11-15 RX ORDER — CLOPIDOGREL BISULFATE 75 MG/1
75 TABLET ORAL DAILY
Qty: 90 TABLET | Refills: 0 | Status: SHIPPED | OUTPATIENT
Start: 2024-11-15

## 2025-01-13 RX ORDER — APIXABAN 5 MG/1
5 TABLET, FILM COATED ORAL 2 TIMES DAILY
Qty: 180 TABLET | Refills: 3 | Status: SHIPPED | OUTPATIENT
Start: 2025-01-13

## 2025-02-12 RX ORDER — ROSUVASTATIN CALCIUM 20 MG/1
20 TABLET, COATED ORAL NIGHTLY
Qty: 90 TABLET | Refills: 1 | Status: SHIPPED | OUTPATIENT
Start: 2025-02-12

## 2025-02-12 RX ORDER — CLOPIDOGREL BISULFATE 75 MG/1
75 TABLET ORAL DAILY
Qty: 90 TABLET | Refills: 0 | Status: SHIPPED | OUTPATIENT
Start: 2025-02-12

## 2025-03-21 PROCEDURE — 93296 REM INTERROG EVL PM/IDS: CPT | Performed by: INTERNAL MEDICINE

## 2025-03-21 PROCEDURE — 93294 REM INTERROG EVL PM/LDLS PM: CPT | Performed by: INTERNAL MEDICINE

## 2025-04-24 NOTE — PROGRESS NOTES
Cardiac Electrophysiology Outpatient Follow Up Note            [facility] at [facility]    Follow Up Office Visit      Ludy Stock  [id number]  04/28/2025  [unfilled]  [unfilled]    Primary Care Physician: Roger Hopper MD    Referred By: No ref. provider found    Subjective     Chief Complaint:   Diagnoses and all orders for this visit:      Chief Complaint   Patient presents with    SSS (sick sinus syndrome)     6-Mo F/U       History of Present Illness:   Ludy Stock is a 74 y.o. female who presents to my electrophysiology clinic for follow up of above.      Past Medical History:   Past Medical History:   Diagnosis Date    Coronary artery disease     Crohn's disease, small and large intestine     Diabetes mellitus     DVT (deep venous thrombosis) 1995    Fibromyalgia     Heart disease     Hypertension     Parathyroid disease     status post parathyroidectomy.    Peptic ulcer disease     PONV (postoperative nausea and vomiting)     PTSD (post-traumatic stress disorder)     Pulmonary hypertension     Symptomatic bradycardia     Systemic lupus erythematosus     TIA (transient ischemic attack) 10/21/2024       Past Surgical History:   Past Surgical History:   Procedure Laterality Date    BIOPSY ABDOMINAL MASS  09/2024    Benign    BLADDER RESECTION LAPAROSCOPIC      BREAST LUMPECTOMY      BUNIONECTOMY      BILATERAL     CARDIAC CATHETERIZATION N/A 12/06/2023    Procedure: Coronary angiography;  Surgeon: German Kelley MD;  Location:  [facility];  Service: Cardiovascular;  Laterality: N/A;    CARDIAC CATHETERIZATION N/A 12/06/2023    Procedure: Stent ISRAEL coronary;  Surgeon: German Kelley MD;  Location:  [facility];  Service: Cardiovascular;  Laterality: N/A;    CARDIAC CATHETERIZATION N/A 12/06/2023    Procedure: Optical Coherence Tomography;  Surgeon: German Kelley MD;  Location:  [facility]  [facility];  Service: Cardiovascular;  Laterality: N/A;    CARPAL TUNNEL RELEASE Right     COLON RESECTION      Partial x2    COSMETIC SURGERY      Bilateral breast implants    HYSTERECTOMY      ICD GENERATOR REPLACEMENT N/A 03/30/2023    Procedure: ICD DC generator change, possible lead revision;  Surgeon: Paul Polo DO;  Location: [facility] INVASIVE LOCATION;  Service: Cardiology;  Laterality: N/A;    TONSILLECTOMY         Family History:   Family History   Problem Relation Age of Onset    Cancer Mother     Heart attack Father     Kidney cancer Father         Metastasized    Kidney cancer Brother         Metastasized       Social History:   Social History     Socioeconomic History    Marital status:    Tobacco Use    Smoking status: Never     Passive exposure: Past    Smokeless tobacco: Never   Vaping Use    Vaping status: Never Used   Substance and Sexual Activity    Alcohol use: Not Currently    Drug use: Never    Sexual activity: Defer       Medications:     Current Outpatient Medications:     acetaminophen (TYLENOL) 500 MG tablet, TAKE 2 TABLETS ORAL ROUTE 4 TIMES PER DAY AS NEEDED FOR PAIN, Disp: , Rfl:     amitriptyline (ELAVIL) 25 MG tablet, Take 1 tablet by mouth At Night As Needed., Disp: , Rfl:     amLODIPine (NORVASC) 5 MG tablet, Take 1 tablet by mouth Daily., Disp: , Rfl:     apixaban (Eliquis) 5 MG tablet tablet, Take 1 tablet by mouth twice daily, Disp: 180 tablet, Rfl: 3    Aspirin 81 MG capsule, Take 1 capsule by mouth Daily., Disp: , Rfl:     clopidogrel (PLAVIX) 75 MG tablet, Take 1 tablet by mouth once daily, Disp: 90 tablet, Rfl: 0    Coenzyme Q10 (CoQ-10) 100 MG capsule, Take 1 capsule by mouth Daily., Disp: , Rfl:     furosemide (LASIX) 80 MG tablet, Take 1 tablet by mouth As Needed (as directed)., Disp: , Rfl:     levothyroxine (SYNTHROID, LEVOTHROID) 125 MCG tablet, Take 175 mcg by mouth Daily., Disp: , Rfl:     lidocaine (LIDODERM) 5 %, APPLY ONE PATCH TOPICALLY TO CLEAN, DRY  SKIN. LEAVE ON FOR 12 HOURS THEN REMOVE. MUST WAIT AT LEAST 12 HOURS BEFORE APPLYING PATCH(ES) AGAIN., Disp: , Rfl:     linaclotide (LINZESS) 290 MCG capsule capsule, Take 1 capsule by mouth Every Morning Before Breakfast., Disp: , Rfl:     lisinopril (PRINIVIL,ZESTRIL) 20 MG tablet, Take 1 tablet by mouth Daily., Disp: 30 tablet, Rfl: 11    meloxicam (MOBIC) 15 MG tablet, Take 1 tablet by mouth Daily., Disp: , Rfl:     metoprolol succinate XL (TOPROL-XL) 25 MG 24 hr tablet, Take 1 tablet by mouth Daily., Disp: 30 tablet, Rfl: 11    Mounjaro 5 MG/0.5ML solution auto-injector, Inject 1 syringe under the skin into the appropriate area as directed 1 (One) Time Per Week., Disp: , Rfl:     naloxone (NARCAN) 4 MG/0.1ML nasal spray, Take 1 spray by nasal route as directed., Disp: , Rfl:     nitroglycerin (NITROSTAT) 0.4 MG SL tablet, Place 1 tablet under the tongue Every 5 (Five) Minutes As Needed for Chest Pain., Disp: , Rfl:     oxybutynin XL (DITROPAN-XL) 10 MG 24 hr tablet, Take 1 tablet by mouth Daily., Disp: , Rfl:     oxyCODONE (ROXICODONE) 15 MG immediate release tablet, Take 1 tablet by mouth 4 (Four) Times a Day., Disp: , Rfl:     potassium chloride (KLOR-CON) 20 MEQ packet, TAKE 1 PACKET ORAL ROUTE ONCE DAILY FOR 3 DAYS IN 6 (SIX) OUNCES OF WATER OR JUICE TAKE AFTER MEAL, Disp: , Rfl:     rosuvastatin (CRESTOR) 20 MG tablet, TAKE 1 TABLET BY MOUTH ONCE DAILY AT NIGHT, Disp: 90 tablet, Rfl: 1    simvastatin (ZOCOR) 20 MG tablet, Take 1 tablet by mouth Daily., Disp: , Rfl:     acetaminophen (TYLENOL) 325 MG tablet, Take 1 tablet by mouth Every 6 (Six) Hours As Needed. (Patient not taking: Reported on 4/28/2025), Disp: , Rfl:     docusate sodium 100 MG capsule, Take 1 capsule by mouth Daily. (Patient not taking: Reported on 4/28/2025), Disp: , Rfl:     ibuprofen (ADVIL,MOTRIN) 600 MG tablet, As Needed. (Patient not taking: Reported on 4/28/2025), Disp: , Rfl:     metFORMIN (GLUCOPHAGE) 500 MG tablet, Take 1 tablet  "by mouth Daily. (Patient not taking: Reported on 4/28/2025), Disp: , Rfl:     methenamine (HIPREX) 1 g tablet, Take 0.5 tablets by mouth 2 (Two) Times a Day. (Patient not taking: Reported on 4/28/2025), Disp: , Rfl:     oxyCODONE-acetaminophen (PERCOCET)  MG per tablet, Take 1 tablet by mouth Every 6 (Six) Hours As Needed for Moderate Pain. (Patient not taking: Reported on 4/28/2025), Disp: , Rfl:     potassium chloride 10 MEQ CR tablet, Take 1 tablet by mouth 2 (Two) Times a Day. (Patient not taking: Reported on 4/28/2025), Disp: , Rfl:     Current Facility-Administered Medications:     albuterol (PROVENTIL) nebulizer solution 0.083% 2.5 mg/3mL, 2.5 mg, Nebulization, Once, Tejal Sands RN    Allergies:   Allergies   Allergen Reactions    Morphine Other (See Comments)       Objective   Vital Signs:   Vitals:    04/28/25 1507   BP: 116/62   BP Location: Left arm   Patient Position: Sitting   Cuff Size: Adult   Pulse: 77   SpO2: 92%   Weight: 60.1 kg (132 lb 9.6 oz)   Height: 152.4 cm (60\")       PHYSICAL EXAM  General appearance: Awake, alert, cooperative  Head: Normocephalic, without obvious abnormality, atraumatic  Eyes: Conjunctivae/corneas clear, EOMs intact  Neck: no adenopathy, no carotid bruit, no JVD, and thyroid: not enlarged  Lungs: clear to auscultation bilaterally and no rhonchi or crackles\", ' symmetric  Heart: regular rate and rhythm, S1, S2 normal, no murmur, click, rub or gallop  Abdomen: Soft, non-tender, bowel sounds normal,  no organomegaly  Extremities: extremities normal, atraumatic, no cyanosis or edema  Skin: Skin color, turgor normal, no rashes or lesions  Neurologic: Grossly normal     Lab Results   Component Value Date    GLUCOSE 97 12/08/2023    CALCIUM 9.6 12/08/2023     12/08/2023    K 4.5 12/08/2023    CO2 31.0 (H) 12/08/2023     12/08/2023    BUN 22 12/08/2023    CREATININE 1.13 (H) 12/08/2023    BCR 19.5 12/08/2023    ANIONGAP 8.0 12/08/2023     Lab Results " "  Component Value Date    WBC 4.25 01/01/2024    HGB 11.8 01/01/2024    HCT 35.3 01/01/2024    MCV 89 01/01/2024     01/01/2024     Lab Results   Component Value Date    INR 0.91 12/05/2023    PROTIME 12.3 12/05/2023     No results found for: \"TSH\", \"F1ZCWDD\", \"A2EVZSL\", \"THYROIDAB\"    Cardiac Testing:     I personally viewed and interpreted the patient's EKG/Telemetry/lab data    Procedures    Tobacco Cessation: N/A  Obstructive Sleep Apnea Screening: Completed    Advance Care Planning   ACP discussion was declined by the patient. Patient does not have an advance directive, declines further assistance.       Assessment & Plan         Diagnosis Plan   1. AF (paroxysmal atrial fibrillation)  Symptomatic recurrent documented paroxysmal atrial fibrillation.  Failure medical therapy.  Long conversation with her about the options including catheter ablation.    I reviewed the specific risk-benefit profile the procedure.  I quoted the patient a 1 to 2% chance of significant procedure complication including but not limited to bleeding at the groin, cardiac perforation, tamponade, stroke, myocardial infarction, death phrenic nerve injury, pulmonary vein stenosis, catheter entrapment of the mitral valve annulus, esophageal injury as well as fistula and other potential complications.    Rhythmia  General anesthesia  Preop CT  Do not hold anticoagulation  Hold antiarrhythmic drug for 3 days    The patient and I made a mutually shared decision ( shared decision making model ) that the patient would be best served by proceeding with the above invasive heart procedure.  This is a high risk invasive cardiac procedure which comes with significant risk of morbidity and mortality.  This patient has significant underlying  heart disease.  Ludy Mccain [place] understands these risks and   has made an informed decision to proceed.      Case Request EP Lab: PVA (paroxysmal), Rythmia, PFA, DNS Eliquis, hold Mounjaro 1 week    CT " Angiogram Chest      2. SSS (sick sinus syndrome)  Atrial pacing support.      3. Essential hypertension  Well controlled      4. Presence of cardiac pacemaker  Permanent pacemaker in situ.      This patient's Cardiac Implanted Electronic Device was manually interrogated and reprogrammed during the patient encounter today.  Iterative programming changes were manually made to determine the sensing threshold, pacing threshold, lead impedance as well as underlying cardiac rhythm.  These programming changes were not limited to but included some or all of the following when appropriate: pacing mode, programmed AV delays, blanking periods, and refractory periods.  Data obtained as a result of these manual programing changes informed the patient's CIED permanent programming.        5. PAF (paroxysmal atrial fibrillation)  As above        Body mass index is 25.9 kg/m².    I spent 49 minutes in consultation with this patient which included more than 65% of this time in direct face-to-face counseling, physical examination and discussion of my assessment and findings and this shared decision making with the patient.  The remainder of the time not spent face-to-face was performing one, some or all of the following actions: preparing to see the patient (e.g. reviewing tests, prior clinicians' notes, etc), ordering medications, tests or procedures, coordination of care, discussion of the plan with other healthcare providers, documenting clinical information in epic as well as independently interpreting results and communication of these results to the patient family and/or caregiver(s).  Please note that this explicitly excludes time spent on other separate billable services such as performing procedures or test interpretation, when applicable.      Follow Up:       Thank you for allowing me to participate in the care of your patient. Please to not hesitate to contact me with additional questions or concerns.      Paul Polo,  DO, FACC, RS  Cardiac Electrophysiologist  [facility] / [facility]

## 2025-04-28 ENCOUNTER — OFFICE VISIT (OUTPATIENT)
Dept: CARDIOLOGY | Facility: CLINIC | Age: 75
End: 2025-04-28
Payer: MEDICARE

## 2025-04-28 VITALS
DIASTOLIC BLOOD PRESSURE: 62 MMHG | HEIGHT: 60 IN | SYSTOLIC BLOOD PRESSURE: 116 MMHG | OXYGEN SATURATION: 92 % | WEIGHT: 132.6 LBS | HEART RATE: 77 BPM | BODY MASS INDEX: 26.03 KG/M2

## 2025-04-28 DIAGNOSIS — I48.0 AF (PAROXYSMAL ATRIAL FIBRILLATION): Primary | ICD-10-CM

## 2025-04-28 DIAGNOSIS — I49.5 SSS (SICK SINUS SYNDROME): ICD-10-CM

## 2025-04-28 DIAGNOSIS — I48.0 PAF (PAROXYSMAL ATRIAL FIBRILLATION): ICD-10-CM

## 2025-04-28 DIAGNOSIS — I10 ESSENTIAL HYPERTENSION: ICD-10-CM

## 2025-04-28 DIAGNOSIS — Z95.0 PRESENCE OF CARDIAC PACEMAKER: ICD-10-CM

## 2025-04-28 PROCEDURE — 93280 PM DEVICE PROGR EVAL DUAL: CPT | Performed by: INTERNAL MEDICINE

## 2025-04-28 PROCEDURE — 99214 OFFICE O/P EST MOD 30 MIN: CPT | Performed by: INTERNAL MEDICINE

## 2025-04-28 RX ORDER — MELOXICAM 15 MG/1
1 TABLET ORAL DAILY
COMMUNITY
Start: 2025-03-28

## 2025-04-28 RX ORDER — ACETAMINOPHEN 500 MG
TABLET ORAL
COMMUNITY

## 2025-04-28 RX ORDER — LIDOCAINE 50 MG/G
PATCH TOPICAL
COMMUNITY

## 2025-04-28 RX ORDER — TIRZEPATIDE 5 MG/.5ML
1 INJECTION, SOLUTION SUBCUTANEOUS WEEKLY
COMMUNITY

## 2025-04-28 RX ORDER — SIMVASTATIN 20 MG
20 TABLET ORAL DAILY
COMMUNITY

## 2025-04-28 RX ORDER — OXYCODONE HYDROCHLORIDE 15 MG/1
15 TABLET ORAL 4 TIMES DAILY
COMMUNITY

## 2025-04-28 RX ORDER — POTASSIUM CHLORIDE 1.5 G/1.58G
POWDER, FOR SOLUTION ORAL
COMMUNITY

## 2025-04-29 DIAGNOSIS — I48.0 PAF (PAROXYSMAL ATRIAL FIBRILLATION): Primary | ICD-10-CM

## 2025-04-29 RX ORDER — ACETAMINOPHEN 325 MG/1
650 TABLET ORAL EVERY 4 HOURS PRN
OUTPATIENT
Start: 2025-04-29

## 2025-04-29 RX ORDER — NITROGLYCERIN 0.4 MG/1
0.4 TABLET SUBLINGUAL
OUTPATIENT
Start: 2025-04-29

## 2025-04-29 RX ORDER — ONDANSETRON 2 MG/ML
4 INJECTION INTRAMUSCULAR; INTRAVENOUS EVERY 6 HOURS PRN
OUTPATIENT
Start: 2025-04-29

## 2025-04-29 RX ORDER — SODIUM CHLORIDE 9 MG/ML
40 INJECTION, SOLUTION INTRAVENOUS AS NEEDED
OUTPATIENT
Start: 2025-04-29

## 2025-05-09 RX ORDER — CLOPIDOGREL BISULFATE 75 MG/1
75 TABLET ORAL DAILY
Qty: 90 TABLET | Refills: 3 | Status: SHIPPED | OUTPATIENT
Start: 2025-05-09

## 2025-05-20 NOTE — NURSING NOTE
PRE-PVA ASSESSMENT  Ludy Stock 1950   [address] [facility] [address]   [phone]        Referral Source: Roger Hopper MD   Information obtained from: [x] Medical record review  [x] Patient report  Scheduled for: PVA on 06/12/2025 with Dr. Polo  Allergies   Allergen Reactions    Morphine Other (See Comments)       AFib Specific History:  AFIBTYPE: paroxysmal    CHADS-VASc Risk Assessment               8 Total Score    1 CHF    1 Hypertension    2 Age >/= 75    1 DM    2 PRIOR STROKE/TIA/THROMBO    1 Sex: Female    Criteria that do not apply:    Vascular Disease    Age 65-74            Anticoagulation: Eliquis 5 mg BID, NO missed doses.   Cardioversion x 1  Failed AAD(s): 0  Prior Ablation: 0    Is Ms. Stock aware of her AFib? Yes   Onset: 2023    Exacerbations: stress   Frequency: 4%   Alleviations: none    Duration: hours     Symptoms:   [x] Palpitations:    [] Chest Discomfort:    [] Dizziness:    [] Presyncope:    [] Lightheadedness:   [] Syncope:    [x] Fatigue:    [] Other:     [] Short of Breath:     Last Echo(s):    [x] TTE Date:     Results for orders placed during the hospital encounter of 12/05/23    Adult Transthoracic Echo Complete w/ Color, Spectral and Contrast if necessary per protocol    Interpretation Summary    Estimated left ventricular EF = 60%    No hemodynamically significant valvular heart disease        Past medical History:     [x] Diabetes             Tx: Mounjaro                   Hemoglobin A1C   Date Value Ref Range Status   12/06/2023 5.40 4.80 - 5.60 % Final   09/18/2015 5.7 4.00 - 6.00 % Final     Comment:     The American Diabetes Association recommends maintenance of Hemoglobin  A1C at 7.0% or lower. Goals for Hemoglobin A1C reduction may need to be  modified if hypoglycemia is a problem.            [x] HYPOthyroidism  [] HYPERthyroidism      Tx: synthroid     TSH (2024) 2.37     [x] HTN        [x] Tx: amlodipine, lisinopril, metoprolol     [x] Heart  Failure-Diastolic    [] CVA  NO                            [x] TIA- 2024        [x] CAD         [x] MI- NSTEMI 2005, 2023           [x] Dyslipidemia  [x] Statin indicated: Rosuvastatin     [x] Ischemic Evaluation       [x] Stress Test: 12/2022 normal MPS (Dr. Samayoa's office)        [x] Heart Cath: 12/6/2023 NSTEMI 90% distal RCA ISR (3 x 28 Xience).  20 to 30% LAD proximal, 20 to 30% first diagonal, proximal 20 to 30% RCA.    [] Sleep Apnea Suspected NO    [] Obesity NO       Other Pertinent PMH:     STOP the following medication(s) as indicated:    Mounjaro Take last dose on:  6/4/25     Summary of Patient Contact:    I spoke with Ms. Stock about her upcoming PVA.   She was well informed about the procedure from prior discussion with Dr. Polo and from reading the provided literature.  We discussed the procedure at length including risks, anesthesia, intra-op procedures, recovery, bedrest, normal post-procedure expectations, and success rates. Patient was advised to call with any medication changes, new or worsening symptoms, or changes to their medical plan of care. I answered a few remaining questions. Ms. Stock verbalized understanding and she is ready to proceed.

## 2025-06-09 ENCOUNTER — TELEPHONE (OUTPATIENT)
Dept: CARDIOLOGY | Facility: CLINIC | Age: 75
End: 2025-06-09

## 2025-06-09 NOTE — TELEPHONE ENCOUNTER
Caller: Ludy Stock    Relationship: Self    Best call back number: [phone]     What is the best time to reach you: ANYTIME    Who are you requesting to speak with (clinical staff, provider,  specific staff member): ANYONE    What was the call regarding: PATIENT IS NEEDING TO RESCHEDULE TODAYS APPTS DUE TO NO TRANSPORTATION. HAS ATTEMPTED TO BEING TRANSFERRED BUT NO SUCCESS. REQUESTING A CALL BACK ASAP.

## 2025-06-10 ENCOUNTER — HOSPITAL ENCOUNTER (OUTPATIENT)
Facility: HOSPITAL | Age: 75
Discharge: HOME | End: 2025-06-10
Admitting: INTERNAL MEDICINE
Payer: MEDICARE

## 2025-06-10 DIAGNOSIS — I48.0 AF (PAROXYSMAL ATRIAL FIBRILLATION): ICD-10-CM

## 2025-06-10 PROCEDURE — 25510000001 IOPAMIDOL PER 1 ML: Performed by: INTERNAL MEDICINE

## 2025-06-10 PROCEDURE — 71275 CT ANGIOGRAPHY CHEST: CPT

## 2025-06-10 RX ORDER — IOPAMIDOL 755 MG/ML
100 INJECTION, SOLUTION INTRAVASCULAR
Status: COMPLETED | OUTPATIENT
Start: 2025-06-10 | End: 2025-06-10

## 2025-06-10 RX ADMIN — IOPAMIDOL 80 ML: 755 INJECTION, SOLUTION INTRAVENOUS at 17:53

## 2025-06-11 ENCOUNTER — ANESTHESIA EVENT (OUTPATIENT)
Dept: CARDIOLOGY | Facility: HOSPITAL | Age: 75
End: 2025-06-11
Payer: MEDICARE

## 2025-06-11 ENCOUNTER — TELEPHONE (OUTPATIENT)
Dept: CARDIOLOGY | Facility: CLINIC | Age: 75
End: 2025-06-11
Payer: MEDICARE

## 2025-06-11 NOTE — ANESTHESIA PREPROCEDURE EVALUATION
Anesthesia Evaluation     Patient summary reviewed and Nursing notes reviewed   history of anesthetic complications:  PONV  NPO Solid Status: > 8 hours  NPO Liquid Status: > 2 hours           Airway   Mallampati: I  TM distance: >3 FB  Neck ROM: full  No difficulty expected  Dental      Pulmonary     breath sounds clear to auscultation  Cardiovascular     ECG reviewed  Rhythm: regular  Rate: normal    (+) pacemaker pacemaker, hypertension, past MI  >12 months, CAD, cardiac stents more than 12 months ago , dysrhythmias, CHF     ROS comment: ECG 2023Atrial-paced rhythm with prolonged AV conduction LAD  Ant ST TW abn     ECHO 2023 EF = 60%   No hemodynamically significant valvular heart disease    [facility] 2023 90% distal RCA  PCI/stent Others 20-30%       SSS (sick sinus syndrome)  Sinus node dysfunction with a dual-chamber permanent pacemaker currently pacing 94% in the right atrium.  Normal lead parameters, battery voltage 3.01 V.      PAF (paroxysmal atrial fibrillation)  2 episodes of atrial fibrillation (9/19/2024 and 9/24/2024) lasting 3.5 and 1.5 hours respectively continue current medical regimen              Neuro/Psych  (+) TIA, psychiatric history  GI/Hepatic/Renal/Endo    (+) GERD (s/p dilatation), PUD, renal disease- CRI, diabetes mellitus    Musculoskeletal     Abdominal    Substance History      OB/GYN          Other        ROS/Med Hx Other: Labs pending     Last Glp 1 8 DAYS AGO                Anesthesia Plan    ASA 3     general   Rapid sequence  (mounjaro)  intravenous induction     Anesthetic plan, risks, benefits, and alternatives have been provided, discussed and informed consent has been obtained with: patient.    Plan discussed with CRNA.      CODE STATUS:

## 2025-06-11 NOTE — TELEPHONE ENCOUNTER
Patient contacted to review upcoming PVA scheduled with Dr. Polo. Patient reports uninterrupted therapy with Eliquis. No changes to plan of care reported. All questions answered at this time. Patient encouraged to reach out with any questions or concerns post PVA. Patient verbalized understanding.

## 2025-06-12 ENCOUNTER — HOSPITAL ENCOUNTER (OUTPATIENT)
Facility: HOSPITAL | Age: 75
Discharge: HOME | End: 2025-06-12
Attending: INTERNAL MEDICINE | Admitting: INTERNAL MEDICINE
Payer: MEDICARE

## 2025-06-12 ENCOUNTER — ANESTHESIA (OUTPATIENT)
Dept: CARDIOLOGY | Facility: HOSPITAL | Age: 75
End: 2025-06-12
Payer: MEDICARE

## 2025-06-12 VITALS
TEMPERATURE: 98 F | HEIGHT: 60 IN | DIASTOLIC BLOOD PRESSURE: 58 MMHG | OXYGEN SATURATION: 94 % | BODY MASS INDEX: 26.35 KG/M2 | HEART RATE: 70 BPM | WEIGHT: 134.2 LBS | RESPIRATION RATE: 12 BRPM | SYSTOLIC BLOOD PRESSURE: 99 MMHG

## 2025-06-12 DIAGNOSIS — I48.0 PAF (PAROXYSMAL ATRIAL FIBRILLATION): ICD-10-CM

## 2025-06-12 DIAGNOSIS — I48.0 AF (PAROXYSMAL ATRIAL FIBRILLATION): ICD-10-CM

## 2025-06-12 PROBLEM — I48.19 PERSISTENT ATRIAL FIBRILLATION: Status: ACTIVE | Noted: 2025-06-12

## 2025-06-12 LAB
ACT BLD: 625 SECONDS (ref 82–152)
ACT BLD: <50 SECONDS (ref 82–152)
ANION GAP SERPL CALCULATED.3IONS-SCNC: 9 MMOL/L (ref 5–15)
BUN SERPL-MCNC: 14.5 MG/DL (ref 8–23)
BUN/CREAT SERPL: 15.6 (ref 7–25)
CALCIUM SPEC-SCNC: 9.6 MG/DL (ref 8.6–10.5)
CHLORIDE SERPL-SCNC: 106 MMOL/L (ref 98–107)
CO2 SERPL-SCNC: 29 MMOL/L (ref 22–29)
CREAT SERPL-MCNC: 0.93 MG/DL (ref 0.57–1)
DEPRECATED RDW RBC AUTO: 45.4 FL (ref 37–54)
EGFRCR SERPLBLD CKD-EPI 2021: 64.2 ML/MIN/1.73
ERYTHROCYTE [DISTWIDTH] IN BLOOD BY AUTOMATED COUNT: 13.8 % (ref 12.3–15.4)
GLUCOSE SERPL-MCNC: 90 MG/DL (ref 65–99)
HCT VFR BLD AUTO: 35.7 % (ref 34–46.6)
HGB BLD-MCNC: 11.5 G/DL (ref 12–15.9)
MCH RBC QN AUTO: 28.8 PG (ref 26.6–33)
MCHC RBC AUTO-ENTMCNC: 32.2 G/DL (ref 31.5–35.7)
MCV RBC AUTO: 89.5 FL (ref 79–97)
PLATELET # BLD AUTO: 288 10*3/MM3 (ref 140–450)
PMV BLD AUTO: 9.5 FL (ref 6–12)
POTASSIUM SERPL-SCNC: 4.1 MMOL/L (ref 3.5–5.2)
QT INTERVAL: 402 MS
QT INTERVAL: 410 MS
QTC INTERVAL: 436 MS
QTC INTERVAL: 442 MS
RBC # BLD AUTO: 3.99 10*6/MM3 (ref 3.77–5.28)
SODIUM SERPL-SCNC: 144 MMOL/L (ref 136–145)
WBC NRBC COR # BLD AUTO: 5.32 10*3/MM3 (ref 3.4–10.8)

## 2025-06-12 PROCEDURE — 25010000002 HEPARIN (PORCINE) PER 1000 UNITS: Performed by: INTERNAL MEDICINE

## 2025-06-12 PROCEDURE — 80048 BASIC METABOLIC PNL TOTAL CA: CPT | Performed by: NURSE PRACTITIONER

## 2025-06-12 PROCEDURE — 25010000002 ADENOSINE PER 6 MG: Performed by: INTERNAL MEDICINE

## 2025-06-12 PROCEDURE — 93010 ELECTROCARDIOGRAM REPORT: CPT | Performed by: INTERNAL MEDICINE

## 2025-06-12 PROCEDURE — 93623 PRGRMD STIMJ&PACG IV RX NFS: CPT | Performed by: INTERNAL MEDICINE

## 2025-06-12 PROCEDURE — 93622 COMP EP EVAL L VENTR PAC&REC: CPT | Performed by: INTERNAL MEDICINE

## 2025-06-12 PROCEDURE — 25810000003 SODIUM CHLORIDE 0.9 % SOLUTION 250 ML FLEX CONT: Performed by: NURSE ANESTHETIST, CERTIFIED REGISTERED

## 2025-06-12 PROCEDURE — 25810000003 SODIUM CHLORIDE 0.9 % SOLUTION: Performed by: NURSE ANESTHETIST, CERTIFIED REGISTERED

## 2025-06-12 PROCEDURE — 25010000002 SUGAMMADEX 200 MG/2ML SOLUTION: Performed by: NURSE ANESTHETIST, CERTIFIED REGISTERED

## 2025-06-12 PROCEDURE — 93005 ELECTROCARDIOGRAM TRACING: CPT | Performed by: ANESTHESIOLOGY

## 2025-06-12 PROCEDURE — C1894 INTRO/SHEATH, NON-LASER: HCPCS | Performed by: INTERNAL MEDICINE

## 2025-06-12 PROCEDURE — 93656 COMPRE EP EVAL ABLTJ ATR FIB: CPT | Performed by: INTERNAL MEDICINE

## 2025-06-12 PROCEDURE — C1759 CATH, INTRA ECHOCARDIOGRAPHY: HCPCS | Performed by: INTERNAL MEDICINE

## 2025-06-12 PROCEDURE — 25010000002 DEXAMETHASONE PER 1 MG: Performed by: NURSE ANESTHETIST, CERTIFIED REGISTERED

## 2025-06-12 PROCEDURE — 93657 TX L/R ATRIAL FIB ADDL: CPT | Performed by: INTERNAL MEDICINE

## 2025-06-12 PROCEDURE — C1730 CATH, EP, 19 OR FEW ELECT: HCPCS | Performed by: INTERNAL MEDICINE

## 2025-06-12 PROCEDURE — 93005 ELECTROCARDIOGRAM TRACING: CPT | Performed by: INTERNAL MEDICINE

## 2025-06-12 PROCEDURE — 25010000002 PROTAMINE SULFATE PER 10 MG: Performed by: INTERNAL MEDICINE

## 2025-06-12 PROCEDURE — 85027 COMPLETE CBC AUTOMATED: CPT | Performed by: NURSE PRACTITIONER

## 2025-06-12 PROCEDURE — 93655 ICAR CATH ABLTJ DSCRT ARRHYT: CPT | Performed by: INTERNAL MEDICINE

## 2025-06-12 PROCEDURE — 25010000002 PHENYLEPHRINE 10 MG/ML SOLUTION 1 ML VIAL: Performed by: NURSE ANESTHETIST, CERTIFIED REGISTERED

## 2025-06-12 PROCEDURE — 25010000002 ONDANSETRON PER 1 MG: Performed by: NURSE ANESTHETIST, CERTIFIED REGISTERED

## 2025-06-12 PROCEDURE — 25010000002 PHENYLEPHRINE 10 MG/ML SOLUTION: Performed by: NURSE ANESTHETIST, CERTIFIED REGISTERED

## 2025-06-12 PROCEDURE — C1733 CATH, EP, OTHR THAN COOL-TIP: HCPCS | Performed by: INTERNAL MEDICINE

## 2025-06-12 PROCEDURE — 36415 COLL VENOUS BLD VENIPUNCTURE: CPT

## 2025-06-12 PROCEDURE — 25010000002 ESMOLOL 100 MG/10ML SOLUTION: Performed by: NURSE ANESTHETIST, CERTIFIED REGISTERED

## 2025-06-12 PROCEDURE — 25010000002 PROPOFOL 10 MG/ML EMULSION: Performed by: NURSE ANESTHETIST, CERTIFIED REGISTERED

## 2025-06-12 PROCEDURE — 85347 COAGULATION TIME ACTIVATED: CPT

## 2025-06-12 PROCEDURE — C1766 INTRO/SHEATH,STRBLE,NON-PEEL: HCPCS | Performed by: INTERNAL MEDICINE

## 2025-06-12 PROCEDURE — 25010000002 LIDOCAINE PF 1% 1 % SOLUTION: Performed by: NURSE ANESTHETIST, CERTIFIED REGISTERED

## 2025-06-12 RX ORDER — SODIUM CHLORIDE, SODIUM LACTATE, POTASSIUM CHLORIDE, CALCIUM CHLORIDE 600; 310; 30; 20 MG/100ML; MG/100ML; MG/100ML; MG/100ML
9 INJECTION, SOLUTION INTRAVENOUS CONTINUOUS
Status: DISCONTINUED | OUTPATIENT
Start: 2025-06-13 | End: 2025-06-12 | Stop reason: SDUPTHER

## 2025-06-12 RX ORDER — PROTAMINE SULFATE 10 MG/ML
INJECTION, SOLUTION INTRAVENOUS
Status: DISCONTINUED | OUTPATIENT
Start: 2025-06-12 | End: 2025-06-12 | Stop reason: HOSPADM

## 2025-06-12 RX ORDER — SODIUM CHLORIDE 0.9 % (FLUSH) 0.9 %
10 SYRINGE (ML) INJECTION EVERY 12 HOURS SCHEDULED
Status: DISCONTINUED | OUTPATIENT
Start: 2025-06-12 | End: 2025-06-12 | Stop reason: HOSPADM

## 2025-06-12 RX ORDER — FAMOTIDINE 10 MG/ML
20 INJECTION, SOLUTION INTRAVENOUS ONCE
Status: DISCONTINUED | OUTPATIENT
Start: 2025-06-12 | End: 2025-06-12 | Stop reason: SDUPTHER

## 2025-06-12 RX ORDER — MIDAZOLAM HYDROCHLORIDE 1 MG/ML
0.5 INJECTION, SOLUTION INTRAMUSCULAR; INTRAVENOUS
Status: DISCONTINUED | OUTPATIENT
Start: 2025-06-12 | End: 2025-06-12 | Stop reason: SDUPTHER

## 2025-06-12 RX ORDER — ONDANSETRON 2 MG/ML
4 INJECTION INTRAMUSCULAR; INTRAVENOUS EVERY 6 HOURS PRN
Status: DISCONTINUED | OUTPATIENT
Start: 2025-06-12 | End: 2025-06-12 | Stop reason: HOSPADM

## 2025-06-12 RX ORDER — SODIUM CHLORIDE 9 MG/ML
INJECTION, SOLUTION INTRAVENOUS CONTINUOUS PRN
Status: DISCONTINUED | OUTPATIENT
Start: 2025-06-12 | End: 2025-06-12 | Stop reason: SURG

## 2025-06-12 RX ORDER — SODIUM CHLORIDE 9 MG/ML
40 INJECTION, SOLUTION INTRAVENOUS AS NEEDED
Status: DISCONTINUED | OUTPATIENT
Start: 2025-06-12 | End: 2025-06-12 | Stop reason: HOSPADM

## 2025-06-12 RX ORDER — ACETAMINOPHEN 325 MG/1
650 TABLET ORAL EVERY 4 HOURS PRN
Status: DISCONTINUED | OUTPATIENT
Start: 2025-06-12 | End: 2025-06-12 | Stop reason: HOSPADM

## 2025-06-12 RX ORDER — SODIUM CHLORIDE, SODIUM LACTATE, POTASSIUM CHLORIDE, CALCIUM CHLORIDE 600; 310; 30; 20 MG/100ML; MG/100ML; MG/100ML; MG/100ML
9 INJECTION, SOLUTION INTRAVENOUS CONTINUOUS
Status: DISCONTINUED | OUTPATIENT
Start: 2025-06-13 | End: 2025-06-12 | Stop reason: HOSPADM

## 2025-06-12 RX ORDER — FAMOTIDINE 10 MG/ML
20 INJECTION, SOLUTION INTRAVENOUS ONCE
Status: DISCONTINUED | OUTPATIENT
Start: 2025-06-12 | End: 2025-06-12 | Stop reason: HOSPADM

## 2025-06-12 RX ORDER — HEPARIN SODIUM 10000 [USP'U]/100ML
INJECTION, SOLUTION INTRAVENOUS
Status: COMPLETED | OUTPATIENT
Start: 2025-06-12 | End: 2025-06-12

## 2025-06-12 RX ORDER — PHENYLEPHRINE HYDROCHLORIDE 10 MG/ML
INJECTION INTRAVENOUS AS NEEDED
Status: DISCONTINUED | OUTPATIENT
Start: 2025-06-12 | End: 2025-06-12 | Stop reason: SURG

## 2025-06-12 RX ORDER — ONDANSETRON 2 MG/ML
INJECTION INTRAMUSCULAR; INTRAVENOUS AS NEEDED
Status: DISCONTINUED | OUTPATIENT
Start: 2025-06-12 | End: 2025-06-12 | Stop reason: SURG

## 2025-06-12 RX ORDER — SODIUM CHLORIDE 0.9 % (FLUSH) 0.9 %
10 SYRINGE (ML) INJECTION AS NEEDED
Status: DISCONTINUED | OUTPATIENT
Start: 2025-06-12 | End: 2025-06-12 | Stop reason: HOSPADM

## 2025-06-12 RX ORDER — VECURONIUM BROMIDE 1 MG/ML
INJECTION, POWDER, LYOPHILIZED, FOR SOLUTION INTRAVENOUS AS NEEDED
Status: DISCONTINUED | OUTPATIENT
Start: 2025-06-12 | End: 2025-06-12 | Stop reason: SURG

## 2025-06-12 RX ORDER — FAMOTIDINE 20 MG/1
20 TABLET, FILM COATED ORAL ONCE
Status: COMPLETED | OUTPATIENT
Start: 2025-06-12 | End: 2025-06-12

## 2025-06-12 RX ORDER — ADENOSINE 3 MG/ML
INJECTION, SOLUTION INTRAVENOUS
Status: DISCONTINUED | OUTPATIENT
Start: 2025-06-12 | End: 2025-06-12 | Stop reason: HOSPADM

## 2025-06-12 RX ORDER — NITROGLYCERIN 0.4 MG/1
0.4 TABLET SUBLINGUAL
Status: DISCONTINUED | OUTPATIENT
Start: 2025-06-12 | End: 2025-06-12 | Stop reason: HOSPADM

## 2025-06-12 RX ORDER — ESMOLOL HYDROCHLORIDE 10 MG/ML
INJECTION INTRAVENOUS AS NEEDED
Status: DISCONTINUED | OUTPATIENT
Start: 2025-06-12 | End: 2025-06-12 | Stop reason: SURG

## 2025-06-12 RX ORDER — FAMOTIDINE 20 MG/1
20 TABLET, FILM COATED ORAL ONCE
Status: DISCONTINUED | OUTPATIENT
Start: 2025-06-12 | End: 2025-06-12 | Stop reason: SDUPTHER

## 2025-06-12 RX ORDER — LIDOCAINE HYDROCHLORIDE 10 MG/ML
0.5 INJECTION, SOLUTION EPIDURAL; INFILTRATION; INTRACAUDAL; PERINEURAL ONCE AS NEEDED
Status: DISCONTINUED | OUTPATIENT
Start: 2025-06-12 | End: 2025-06-12 | Stop reason: SDUPTHER

## 2025-06-12 RX ORDER — LIDOCAINE HYDROCHLORIDE 10 MG/ML
0.5 INJECTION, SOLUTION EPIDURAL; INFILTRATION; INTRACAUDAL; PERINEURAL ONCE AS NEEDED
Status: DISCONTINUED | OUTPATIENT
Start: 2025-06-12 | End: 2025-06-12 | Stop reason: HOSPADM

## 2025-06-12 RX ORDER — LIDOCAINE HYDROCHLORIDE 10 MG/ML
INJECTION, SOLUTION EPIDURAL; INFILTRATION; INTRACAUDAL; PERINEURAL AS NEEDED
Status: DISCONTINUED | OUTPATIENT
Start: 2025-06-12 | End: 2025-06-12 | Stop reason: SURG

## 2025-06-12 RX ORDER — PROPOFOL 10 MG/ML
VIAL (ML) INTRAVENOUS AS NEEDED
Status: DISCONTINUED | OUTPATIENT
Start: 2025-06-12 | End: 2025-06-12 | Stop reason: SURG

## 2025-06-12 RX ORDER — DEXAMETHASONE SODIUM PHOSPHATE 10 MG/ML
INJECTION, SOLUTION INTRA-ARTICULAR; INTRALESIONAL; INTRAMUSCULAR; INTRAVENOUS; SOFT TISSUE AS NEEDED
Status: DISCONTINUED | OUTPATIENT
Start: 2025-06-12 | End: 2025-06-12 | Stop reason: SURG

## 2025-06-12 RX ORDER — MIDAZOLAM HYDROCHLORIDE 1 MG/ML
0.5 INJECTION, SOLUTION INTRAMUSCULAR; INTRAVENOUS
Status: DISCONTINUED | OUTPATIENT
Start: 2025-06-12 | End: 2025-06-12 | Stop reason: HOSPADM

## 2025-06-12 RX ORDER — HEPARIN SODIUM 1000 [USP'U]/ML
INJECTION, SOLUTION INTRAVENOUS; SUBCUTANEOUS
Status: DISCONTINUED | OUTPATIENT
Start: 2025-06-12 | End: 2025-06-12 | Stop reason: HOSPADM

## 2025-06-12 RX ADMIN — SUGAMMADEX 200 MG: 100 INJECTION, SOLUTION INTRAVENOUS at 09:15

## 2025-06-12 RX ADMIN — PHENYLEPHRINE HYDROCHLORIDE 200 MCG: 10 INJECTION INTRAVENOUS at 08:50

## 2025-06-12 RX ADMIN — PHENYLEPHRINE HYDROCHLORIDE 200 MCG: 10 INJECTION INTRAVENOUS at 08:35

## 2025-06-12 RX ADMIN — ESMOLOL HYDROCHLORIDE 70 MG: 10 INJECTION, SOLUTION INTRAVENOUS at 08:27

## 2025-06-12 RX ADMIN — PHENYLEPHRINE HYDROCHLORIDE 0.5 MCG/KG/MIN: 10 INJECTION INTRAVENOUS at 08:35

## 2025-06-12 RX ADMIN — LIDOCAINE HYDROCHLORIDE 50 MG: 10 INJECTION, SOLUTION EPIDURAL; INFILTRATION; INTRACAUDAL; PERINEURAL at 08:27

## 2025-06-12 RX ADMIN — FAMOTIDINE 20 MG: 20 TABLET, FILM COATED ORAL at 08:16

## 2025-06-12 RX ADMIN — PROPOFOL 150 MG: 10 INJECTION, EMULSION INTRAVENOUS at 08:27

## 2025-06-12 RX ADMIN — SODIUM CHLORIDE: 9 INJECTION, SOLUTION INTRAVENOUS at 08:20

## 2025-06-12 RX ADMIN — ONDANSETRON 4 MG: 2 INJECTION INTRAMUSCULAR; INTRAVENOUS at 09:08

## 2025-06-12 RX ADMIN — VECURONIUM BROMIDE 10 MG: 1 INJECTION, POWDER, LYOPHILIZED, FOR SOLUTION INTRAVENOUS at 08:27

## 2025-06-12 RX ADMIN — DEXAMETHASONE SODIUM PHOSPHATE 4 MG: 10 INJECTION INTRAMUSCULAR; INTRAVENOUS at 08:40

## 2025-06-12 NOTE — ANESTHESIA PROCEDURE NOTES
Airway  Reason: elective    Date/Time: 6/12/2025 8:29 AM  Airway not difficult    General Information and Staff    Patient location during procedure: OR  CRNA/CAA: Luis Anaya CRNA    Indications and Patient Condition  Indications for airway management: airway protection    Preoxygenated: yes  MILS not maintained throughout    Mask difficulty assessment: 1 - vent by mask    Final Airway Details    Final airway type: endotracheal airway      Successful airway: ETT  Cuffed: yes   Successful intubation technique: direct laryngoscopy  Endotracheal tube insertion site: oral  Blade: Vincent  Blade size: 3  ETT size (mm): 7.0  Cormack-Lehane Classification: grade I - full view of glottis  Placement verified by: chest auscultation and capnometry   Measured from: lips  ETT/EBT  to lips (cm): 20  Number of attempts at approach: 1  Assessment: lips, teeth, and gum same as pre-op and atraumatic intubation    Additional Comments  Negative epigastric sounds, Breath sound equal bilaterally with symmetric chest rise and fall

## 2025-06-12 NOTE — ANESTHESIA POSTPROCEDURE EVALUATION
Patient: Ludy Stock    Procedure Summary       Date: 06/12/25 Room / Location: [facility] / [facility]    Anesthesia Start: 0820 Anesthesia Stop: 0922    Procedure: PVA (paroxysmal), Rythmia, PFA, DNS Eliquis, hold Mounjaro 1 week Diagnosis:       AF (paroxysmal atrial fibrillation)      (AF)    Providers: Paul Polo DO Provider: Jean Paul Caldera MD    Anesthesia Type: general ASA Status: 3            Anesthesia Type: general    Vitals  No vitals data found for the desired time range.          Post Anesthesia Care and Evaluation    Patient location during evaluation: PACU  Patient participation: complete - patient participated  Level of consciousness: responsive to verbal stimuli  Pain score: 0  Pain management: adequate    Airway patency: patent  Anesthetic complications: No anesthetic complications  PONV Status: none  Cardiovascular status: hemodynamically stable and acceptable  Respiratory status: nonlabored ventilation, acceptable, nasal cannula and spontaneous ventilation  Hydration status: acceptable    Comments: VSS  No anesthesia care post op

## 2025-06-12 NOTE — Clinical Note
Trans-septal procedure in progress 1st transseptal access obtained using faradrive sheath and baylis wire

## 2025-06-12 NOTE — H&P
Pre-cardiac Ablation History and Physical  [facility]      Patient:  Ludy Stock  :  1950  MRN: [id number]    PCP:  Roger Hopper MD  PHONE:  [phone]    DATE: 2025  ID: Ludy Stock is a 74 y.o. female from [facility]    CC: palpitations, afib    Patient Active Problem List    Diagnosis Date Noted    *AF (paroxysmal atrial fibrillation) 2025    Presence of cardiac pacemaker 10/28/2024    Chronic anticoagulation 10/28/2024    Obese 10/28/2024    DM2 (diabetes mellitus, type 2) 10/28/2024    Type 1 myocardial infarction 2023    PAF (paroxysmal atrial fibrillation) 2023    Diastolic CHF, chronic 2023    SSS (sick sinus syndrome) 2023     Note Last Updated: 10/28/2024     Status post dual-chamber [employer] pacemaker, 2010.    Palpitations associated with syncopal episode in 2009.  Event recorder, 2009, revealing sinus bradycardia with heart rates as low as 38 (during waking hours).  Pacemaker generator change [employer] model PM 2272 pulse generator with serial number [id number].  3/30/2023      Coronary artery disease      Note Last Updated: 10/28/2024     History of cardiac catheterizations,  and , and .  Reported PCI at [facility], .  Pharmacologic MPS, 03/10/2015: Normal perfusion.   Normal LVEF.   3.5 x 32 stent to RCA performed in [facility]  2022 normal MPS (Dr. Samayoa's office)  2023 NSTEMI 90% distal RCA ISR (3 x 28 Xience).  20 to 30% LAD proximal, 20 to 30% first diagonal, proximal 20 to 30% RCA.      Pulmonary hypertension      Note Last Updated: 10/28/2024     Secondary to Fen-Phen use.  Echocardiogram, 10/28/2009: Normal LVEF, RVSP 32 mmHg.  Echocardiogram, 03/10/2015:  Normal.      Systemic lupus erythematosus     Fibromyalgia     Peptic ulcer disease     Crohn's disease, small and large intestine     PTSD (post-traumatic stress  disorder)     DVT (deep venous thrombosis) 01/01/1995          BRIEF HPI:   Ms. Stock is a 73 y/o female with the above medical history who presents for pulmonary vein ablation.  She continues to suffer from symptomatic paroxysmal atrial fibrillation.  She states she is able to feel when she goes out of rhythm with palpitations but overall feels very fatigued around these episodes.  She states she had a stroke in February. I do not have records of this.    Allergies:      Allergies   Allergen Reactions    Morphine Other (See Comments)       MEDICATIONS:  Current Outpatient Medications   Medication Instructions    acetaminophen (TYLENOL) 500 MG tablet TAKE 2 TABLETS ORAL ROUTE 4 TIMES PER DAY AS NEEDED FOR PAIN    amLODIPine (NORVASC) 5 MG tablet 1 tablet, Daily    Aspirin 81 MG capsule 1 capsule, Daily    clopidogrel (PLAVIX) 75 mg, Oral, Daily    Coenzyme Q10 (CoQ-10) 100 MG capsule 1 capsule, Daily    Eliquis 5 mg, Oral, 2 Times Daily    furosemide (LASIX) 80 mg, Oral, As Needed    levothyroxine (SYNTHROID, LEVOTHROID) 175 mcg, Daily    lidocaine (LIDODERM) 5 % APPLY ONE PATCH TOPICALLY TO CLEAN, DRY SKIN. LEAVE ON FOR 12 HOURS THEN REMOVE. MUST WAIT AT LEAST 12 HOURS BEFORE APPLYING PATCH(ES) AGAIN.    linaclotide (LINZESS) 290 mcg, Every Morning Before Breakfast    lisinopril (PRINIVIL,ZESTRIL) 20 mg, Oral, Daily    meloxicam (MOBIC) 15 MG tablet 1 tablet, Daily    methenamine (HIPREX) 0.5 g, 2 Times Daily    metoprolol succinate XL (TOPROL-XL) 25 mg, Oral, Daily    Mounjaro 5 MG/0.5ML solution auto-injector 1 syringe, Weekly    naloxone (NARCAN) 4 MG/0.1ML nasal spray Take 1 spray by nasal route as directed.    nitroglycerin (NITROSTAT) 0.4 mg, Every 5 Minutes PRN    oxybutynin XL (DITROPAN-XL) 10 mg, Daily    oxyCODONE (ROXICODONE) 15 mg, 4 Times Daily    oxyCODONE-acetaminophen (PERCOCET)  MG per tablet 1 tablet, Every 6 Hours PRN    potassium chloride (KLOR-CON) 20 MEQ packet TAKE 1 PACKET ORAL ROUTE  "ONCE DAILY FOR 3 DAYS IN 6 (SIX) OUNCES OF WATER OR JUICE TAKE AFTER MEAL    rosuvastatin (CRESTOR) 20 mg, Oral, Nightly    simvastatin (ZOCOR) 20 mg, Daily       Past medical & surgical history, social and family history reviewed in the electronic medical record.    ROS: Pertinent positives listed in the HPI and problem list above. All others reviewed and negative.     Physical Exam:   /69 (BP Location: Left arm, Patient Position: Sitting)   Pulse 71   Temp 97 °F (36.1 °C) (Temporal)   Resp 16   Ht 152.4 cm (60\")   Wt 60.9 kg (134 lb 3.2 oz)   SpO2 99%   BMI 26.21 kg/m²     Constitutional:    Well-appearing 74 y.o. y/o adult  in no acute distress        Heart:    Regular rhythm and normal rate, no murmurs, rubs or gallops   Lungs:     Clear to auscultation bilaterally, no wheezes, rhales or rhonchi, nonlabored respirations       Extremities:   No gross deformities, no edema, clubbing, or cyanosis.    Pulses:    Neuro:  Psych:   Radial pulses palpable and equal bilaterally.  No gross focal deficits  Mood and behavior appropriate for situation       Labs and Diagnostic Data:  Lab Results   Component Value Date    GLUCOSE 97 12/08/2023    BUN 22 12/08/2023    CREATININE 1.13 (H) 12/08/2023     12/08/2023    K 4.5 12/08/2023     12/08/2023    CALCIUM 9.6 12/08/2023    PROTEINTOT 5.5 (L) 12/05/2023    ALBUMIN 3.7 12/05/2023    ALT <5 12/05/2023    AST 17 12/05/2023    ALKPHOS 79 12/05/2023    BILITOT 0.2 12/05/2023    GLOB 1.8 12/05/2023    AGRATIO 2.1 12/05/2023    BCR 19.5 12/08/2023    ANIONGAP 8.0 12/08/2023    EGFR 51.2 (L) 12/08/2023     Lab Results   Component Value Date    WBC 4.25 01/01/2024    HGB 11.8 01/01/2024    HCT 35.3 01/01/2024    MCV 89 01/01/2024     01/01/2024     No results found for: \"TSH\", \"N6FQVXX\", \"Y7QUGLT\", \"THYROIDAB\"        IMPRESSION:  Ms. Quach is a 74-year-old female with symptomatic paroxysmal atrial fibrillation and sick sinus syndrome s/p pacemaker " implantation who presents for catheter ablation.  Held GLP1a x1 dose    PLAN:  Procedure to perform: PVA. Risks, benefits and alternatives to the procedure explained to the patient and she understands and wishes to proceed.     Electronically signed by Tyler Dai PA-C, 06/12/25, 8:03 AM EDT.

## 2025-06-12 NOTE — SIGNIFICANT NOTE
Discharge instructions discussed with patient and family. Denies any further questions at this time. Verbalized understanding of all dc instructions

## 2025-06-12 NOTE — Clinical Note
DR. KAILEY CLOUD NOTIFIED OF ACT STILL RUNNING, CURRENTLY GREATER THAN 690. NO ORDERS AT THIS TIME.

## 2025-06-12 NOTE — OP NOTE
"          Cardiac Electrophysiology Procedure Note           [facility]         CATHETER ABLATION FOR ATRIAL FIBRILLATION (PVI)    PROCEDURES PERFORMED:    Catheter ablation of paroxysmal and persistent atrial fibrillation  Adjunctive ablation of left atrial roof for atrial fibrillation  Adjunctive ablation of left atrial inferior wall  Catheter ablation SVT #1 organized atrial tachycardia arising from the left atrial posterior wall  Catheter ablation SVT #2 typical right atrial flutter  Full diagnostic EP study  Rhythmia 3D electroanatomic mapping  drug infusion / programmed pacing  Intracadiac Echocardiography  transeptal puncture  Left ventricular pacing and recording    PREPROCEDURAL DIAGNOSES:    1.  Persistent and paroxysmal atrial fibrillation    POST PROCEDURE DIANGOSES:  As above.    INDICATION FOR PROCEDURE:  Briefly, Ludy Stock is a 74 y.o. year old female with a history of highly symptomatic recurrent atrial fibrillation.  Initially paroxysmal has developed more recently and episodes of persistent A-fib as well.    ANTICOAGULATION STRATEGY PRIOR TO AND POST PROCEDURE: DOAC.  The last dose of anticoagulant was confirmed to have been taken this morning.      PT/INR:  No results found for: \"LABPROT\", \"INR\"  PTT:  No results found for: \"APTT\"    CBC:   WBC   Date Value Ref Range Status   06/12/2025 5.32 3.40 - 10.80 10*3/mm3 Final     RBC   Date Value Ref Range Status   06/12/2025 3.99 3.77 - 5.28 10*6/mm3 Final     Hemoglobin   Date Value Ref Range Status   06/12/2025 11.5 (L) 12.0 - 15.9 g/dL Final     Hematocrit   Date Value Ref Range Status   06/12/2025 35.7 34.0 - 46.6 % Final     MCV   Date Value Ref Range Status   06/12/2025 89.5 79.0 - 97.0 fL Final     RDW   Date Value Ref Range Status   06/12/2025 13.8 12.3 - 15.4 % Final     Platelets   Date Value Ref Range Status   06/12/2025 288 140 - 450 10*3/mm3 Final     BMP:     Sodium   Date Value Ref Range " "Status   06/12/2025 144 136 - 145 mmol/L Final     Potassium   Date Value Ref Range Status   06/12/2025 4.1 3.5 - 5.2 mmol/L Final     Chloride   Date Value Ref Range Status   06/12/2025 106 98 - 107 mmol/L Final     CO2   Date Value Ref Range Status   06/12/2025 29.0 22.0 - 29.0 mmol/L Final     BUN   Date Value Ref Range Status   06/12/2025 14.5 8.0 - 23.0 mg/dL Final     Creatinine   Date Value Ref Range Status   06/12/2025 0.93 0.57 - 1.00 mg/dL Final     eGFR   Date Value Ref Range Status   06/12/2025 64.2 >60.0 mL/min/1.73 Final       Vital Signs: /63   Pulse 70   Temp 98 °F (36.7 °C) (Skin)   Resp 12   Ht 152.4 cm (60\")   Wt 60.9 kg (134 lb 3.2 oz)   SpO2 92%   BMI 26.21 kg/m²      Admit Weight:  60.9 kg (134 lb 3.2 oz)  BMI: Body mass index is 26.21 kg/m².    PROCEDURE NARRATIVE:    The patient was able to give written informed consent after revisiting the key portions of the risk versus benefit profile of the procedure.  This discussion was framed by our lengthy conversations  (please see our detailed notes).  Patient verbalized strong understanding of this discussion and a strong desire to proceed with the procedure.  Please note that this detailed informed consent process utilized mutual and shared decision making process between all parties involved, principally the physician and patient, but also potentially with input from the patient's selected family and friends.    The patient was brought to the EP laboratory in the post absorptive state.  The patient was electively intubated for the procedure and given a general anesthetic by colleagues from anesthesia.  Please see the detailed anesthesia records.    The patient was then prepared and draped in a routing sterile fashion.  Seldinger access was obtained at the bilateral common femoral veins with 3 venipunctures.  J tip wires were advanced into the vascular space.  Short 11, 8 and a long  sheath were placed into the bilateral common " femoral veins and the inferior vena cava / right atrium in an over the wire fashion.    The patient was anticoagulated with intravenous heparin (initial bolus and then continuous infusion) with a goal ACT of between 350 and 400 seconds.    A phased array ICE catheter was placed into the right atrium and right ventricle.  This was used for transeptal puncture, monitoring of the pericardial space, monitoring of the ablation catheter position within the heart and monitoring of other cardiac structures such as the left atrial appendage (to document the absence of thrombus), pulmonary veins, esophagus, mitral valve annulus and other cardiac structures.    Mon-septal puncture was performed after heparin administration with a combination of echocardiographic and fluoroscopic guidance.  This was performed with the long sheath, a BRK needle, and a SafeSept transeptal wire.  Catheters and sheaths were advanced safely into the left atrium.  A multielectrode electrophysiology catheter was used for pacing and recording in the left atrium, left atrial appendage, pulmonary veins and left ventricle at various times throughout the procedure.    We used the [employer] 3D electroanatomic mapping system in conjunction with these catheters to construct an electroanatomic shell of the left atrium, left atrial appendage, mitral valve annulus, interatrial septum and pulmonary veins.     Left ventricular pacing and recording were performed by placing catheters safely and carefully across the mitral valve annulus to the left ventricle from the left atrium.  Adequate sensing and pacing thresholds were obtained from the left ventricle.  AV conduction ( antegrade ) as well as VA conduction ( retrograde ) were studied.  This was performed as a distinct addition to the diagnostic EP study.  Findings were conclusive for no accessory pathway and VA dissociation.    Catheter ablation was performed to achieve pulmonary vein isolation.  A wide antral  circumferential ablation approach was used.  Additional lesions were given within the antral lesion set at the cecil between superior and inferior veins to achieve total isolation, when and where necessary.      The patient remained in atrial fibrillation.  Adjunctive ablation was performed to achieve isolation of the left atrial roof connecting the left superior to the right superior pulmonary veins.    The patient remained in atrial fibrillation.  Additional adjunctive ablation was performed to achieve isolation of the left atrial inferior wall connecting the left inferior to the right inferior pulmonary veins.     The patient remained in atrial fibrillation.  Additional adjunctive ablation was performed to isolate the entire left atrial posterior wall.    Organized atrial tachycardia ensued.  This was mapped as a separate arrhythmia.  This is referred to as SVT #1.  The mechanism was distinct from atrial fibrillation.  The mechanism of this tachycardia was determined to be a focal AT arising from the LA posterior wall.  We ablated this SVT and isolated the entire LA posterior wall.    We turned our attention to performing typical atrial flutter ablation.  Please note that this is a separate SVT with a distinct mechanism from the patients atrial fibrillation.  This is referred to as SVT #2.  Ablation was performed along the cavo tricuspid isthmus beginning at the ventricular aspect in extended to the caval aspect of the cavo tricuspid isthmus.  We then demonstrated that there was bidirectional block with differential pacing maneuvers.    After completing the antral ablation lesion set, I interrogated the pulmonary veins using and documented pulmonary vein isolation.    Adenosine 12 mg was administered intravenously following ablation to test for other atrial and or ventricular arrhythmias.   Programmed stimulation was performed in an attempt to induce other and additional arrhythmias.  No arrhythmias were induced  during administration and washout.    The ICE catheter revealed that there was no pericardial effusion.    Catheters and sheaths were then removed from the body.    Hemostasis was achieved with figure-of-eight stitch closure device.  Bedrest 2 hours.  Anticipate home later today or tomorrow.      The patient was extubated in routine fashion and transferred to recovery in stable condition.    COMPLICATIONS: none    EBL: minimal    KEY PROCEDURAL FINDINGS:  Wide antral circumferential pulmonary vein isolation ablation left atrial roof left atrial posterior wall left atrial inferior wall as well as cavotricuspid isthmus as outlined above.    POST PROCEDURAL PLAN:    Report was called the the recovery nurse responsible for the patients care.  Uninterrupted anticoagulation for not less than 90 days unless specially instructed otherwise by myself or another member of our EP physician team.  Please note that the patient and the patient’s family have been extensively counseled about this critical requirement and have agreed to comply.  Anticipate discharge this day.  Medications were reconciled, and key changes in medications include: no changes  The patient will be seen at our office per routine follow up.      Paul Polo DO, FACC, [facility]  Cardiac Electrophysiologist  [facility]

## 2025-06-12 NOTE — Clinical Note
ACT STILL RUNNING, CURRENTLY GREATER THAN 990. RN ASKED DR. KAILEY CLOUD IF HE WOULD LIKE A REDRAW OF ACT AND HE VERBALLY CONFIRMED, VERBAL ORDER TO STOP CURRENT ACT. VERBAL ORDER READ BACK AND CONFIRMED.

## 2025-06-13 ENCOUNTER — CALL CENTER PROGRAMS (OUTPATIENT)
Dept: CALL CENTER | Facility: HOSPITAL | Age: 75
End: 2025-06-13
Payer: MEDICARE

## 2025-06-13 NOTE — OUTREACH NOTE
PCI/Device Survey      Flowsheet Row Responses   Facility patient discharged from? [place]   Procedure date 06/12/25   Procedure (if device, specify in description) Ablation   Performing MD Dr. Paul Polo   Attempt successful? No   Unsuccessful attempts Attempt 1            [facility] - Registered Nurse

## 2025-06-13 NOTE — OUTREACH NOTE
PCI/Device Survey      Flowsheet Row Responses   Facility patient discharged from? [place]   Procedure date 06/12/25   Procedure (if device, specify in description) Ablation   Performing MD Dr. Paul Polo   Attempt successful? No   Unsuccessful attempts Attempt 2            [facility] - Registered Nurse

## 2025-06-15 ENCOUNTER — HOSPITAL ENCOUNTER (EMERGENCY)
Facility: HOSPITAL | Age: 75
Discharge: HOME | End: 2025-06-15
Attending: EMERGENCY MEDICINE | Admitting: EMERGENCY MEDICINE
Payer: MEDICARE

## 2025-06-15 ENCOUNTER — APPOINTMENT (OUTPATIENT)
Dept: CT IMAGING | Facility: HOSPITAL | Age: 75
End: 2025-06-15
Payer: MEDICARE

## 2025-06-15 VITALS
RESPIRATION RATE: 16 BRPM | SYSTOLIC BLOOD PRESSURE: 140 MMHG | DIASTOLIC BLOOD PRESSURE: 74 MMHG | HEART RATE: 70 BPM | TEMPERATURE: 98.2 F | WEIGHT: 128 LBS | HEIGHT: 60 IN | OXYGEN SATURATION: 95 % | BODY MASS INDEX: 25.13 KG/M2

## 2025-06-15 DIAGNOSIS — R10.31 ACUTE BILATERAL LOWER ABDOMINAL PAIN: Primary | ICD-10-CM

## 2025-06-15 DIAGNOSIS — Z87.19 HISTORY OF CROHN'S DISEASE: ICD-10-CM

## 2025-06-15 DIAGNOSIS — R10.32 ACUTE BILATERAL LOWER ABDOMINAL PAIN: Primary | ICD-10-CM

## 2025-06-15 DIAGNOSIS — Z86.39 HISTORY OF DIABETES MELLITUS: ICD-10-CM

## 2025-06-15 DIAGNOSIS — Z86.79 STATUS POST ABLATION OF ATRIAL FIBRILLATION: ICD-10-CM

## 2025-06-15 DIAGNOSIS — Z98.890 STATUS POST ABLATION OF ATRIAL FIBRILLATION: ICD-10-CM

## 2025-06-15 LAB
ALBUMIN SERPL-MCNC: 4.3 G/DL (ref 3.5–5.2)
ALBUMIN/GLOB SERPL: 2.2 G/DL
ALP SERPL-CCNC: 85 U/L (ref 39–117)
ALT SERPL W P-5'-P-CCNC: 19 U/L (ref 1–33)
ANION GAP SERPL CALCULATED.3IONS-SCNC: 11 MMOL/L (ref 5–15)
AST SERPL-CCNC: 43 U/L (ref 1–32)
BACTERIA UR QL AUTO: ABNORMAL /HPF
BASOPHILS # BLD AUTO: 0.04 10*3/MM3 (ref 0–0.2)
BASOPHILS NFR BLD AUTO: 0.7 % (ref 0–1.5)
BILIRUB SERPL-MCNC: 0.5 MG/DL (ref 0–1.2)
BILIRUB UR QL STRIP: NEGATIVE
BUN SERPL-MCNC: 10.5 MG/DL (ref 8–23)
BUN/CREAT SERPL: 11.3 (ref 7–25)
CALCIUM SPEC-SCNC: 10.4 MG/DL (ref 8.6–10.5)
CHLORIDE SERPL-SCNC: 104 MMOL/L (ref 98–107)
CLARITY UR: CLEAR
CO2 SERPL-SCNC: 28 MMOL/L (ref 22–29)
COLOR UR: YELLOW
CREAT SERPL-MCNC: 0.93 MG/DL (ref 0.57–1)
DEPRECATED RDW RBC AUTO: 44.8 FL (ref 37–54)
EGFRCR SERPLBLD CKD-EPI 2021: 64.2 ML/MIN/1.73
EOSINOPHIL # BLD AUTO: 0.54 10*3/MM3 (ref 0–0.4)
EOSINOPHIL NFR BLD AUTO: 10.1 % (ref 0.3–6.2)
ERYTHROCYTE [DISTWIDTH] IN BLOOD BY AUTOMATED COUNT: 13.8 % (ref 12.3–15.4)
GLOBULIN UR ELPH-MCNC: 2 GM/DL
GLUCOSE SERPL-MCNC: 105 MG/DL (ref 65–99)
GLUCOSE UR STRIP-MCNC: NEGATIVE MG/DL
HCT VFR BLD AUTO: 36.8 % (ref 34–46.6)
HGB BLD-MCNC: 12.2 G/DL (ref 12–15.9)
HGB UR QL STRIP.AUTO: NEGATIVE
HYALINE CASTS UR QL AUTO: ABNORMAL /LPF
IMM GRANULOCYTES # BLD AUTO: 0.01 10*3/MM3 (ref 0–0.05)
IMM GRANULOCYTES NFR BLD AUTO: 0.2 % (ref 0–0.5)
KETONES UR QL STRIP: NEGATIVE
LEUKOCYTE ESTERASE UR QL STRIP.AUTO: ABNORMAL
LIPASE SERPL-CCNC: 15 U/L (ref 13–60)
LYMPHOCYTES # BLD AUTO: 1.57 10*3/MM3 (ref 0.7–3.1)
LYMPHOCYTES NFR BLD AUTO: 29.2 % (ref 19.6–45.3)
MCH RBC QN AUTO: 29.3 PG (ref 26.6–33)
MCHC RBC AUTO-ENTMCNC: 33.2 G/DL (ref 31.5–35.7)
MCV RBC AUTO: 88.2 FL (ref 79–97)
MONOCYTES # BLD AUTO: 0.62 10*3/MM3 (ref 0.1–0.9)
MONOCYTES NFR BLD AUTO: 11.5 % (ref 5–12)
NEUTROPHILS NFR BLD AUTO: 2.59 10*3/MM3 (ref 1.7–7)
NEUTROPHILS NFR BLD AUTO: 48.3 % (ref 42.7–76)
NITRITE UR QL STRIP: NEGATIVE
NRBC BLD AUTO-RTO: 0 /100 WBC (ref 0–0.2)
PH UR STRIP.AUTO: 8 [PH] (ref 5–8)
PLATELET # BLD AUTO: 294 10*3/MM3 (ref 140–450)
PMV BLD AUTO: 9.4 FL (ref 6–12)
POTASSIUM SERPL-SCNC: 4 MMOL/L (ref 3.5–5.2)
PROT SERPL-MCNC: 6.3 G/DL (ref 6–8.5)
PROT UR QL STRIP: NEGATIVE
RBC # BLD AUTO: 4.17 10*6/MM3 (ref 3.77–5.28)
RBC # UR STRIP: ABNORMAL /HPF
REF LAB TEST METHOD: ABNORMAL
SODIUM SERPL-SCNC: 143 MMOL/L (ref 136–145)
SP GR UR STRIP: 1.01 (ref 1–1.03)
SQUAMOUS #/AREA URNS HPF: ABNORMAL /HPF
UROBILINOGEN UR QL STRIP: ABNORMAL
WBC # UR STRIP: ABNORMAL /HPF
WBC NRBC COR # BLD AUTO: 5.37 10*3/MM3 (ref 3.4–10.8)

## 2025-06-15 PROCEDURE — 81001 URINALYSIS AUTO W/SCOPE: CPT | Performed by: EMERGENCY MEDICINE

## 2025-06-15 PROCEDURE — 25010000002 HYDROMORPHONE 1 MG/ML SOLUTION: Performed by: EMERGENCY MEDICINE

## 2025-06-15 PROCEDURE — 83690 ASSAY OF LIPASE: CPT | Performed by: EMERGENCY MEDICINE

## 2025-06-15 PROCEDURE — 74177 CT ABD & PELVIS W/CONTRAST: CPT

## 2025-06-15 PROCEDURE — 25010000002 ONDANSETRON PER 1 MG: Performed by: EMERGENCY MEDICINE

## 2025-06-15 PROCEDURE — 85025 COMPLETE CBC W/AUTO DIFF WBC: CPT | Performed by: EMERGENCY MEDICINE

## 2025-06-15 PROCEDURE — 96375 TX/PRO/DX INJ NEW DRUG ADDON: CPT

## 2025-06-15 PROCEDURE — 99285 EMERGENCY DEPT VISIT HI MDM: CPT

## 2025-06-15 PROCEDURE — 80053 COMPREHEN METABOLIC PANEL: CPT | Performed by: EMERGENCY MEDICINE

## 2025-06-15 PROCEDURE — 25510000001 IOPAMIDOL 61 % SOLUTION: Performed by: EMERGENCY MEDICINE

## 2025-06-15 PROCEDURE — 96374 THER/PROPH/DIAG INJ IV PUSH: CPT

## 2025-06-15 PROCEDURE — 96376 TX/PRO/DX INJ SAME DRUG ADON: CPT

## 2025-06-15 RX ORDER — ONDANSETRON 2 MG/ML
4 INJECTION INTRAMUSCULAR; INTRAVENOUS ONCE
Status: COMPLETED | OUTPATIENT
Start: 2025-06-15 | End: 2025-06-15

## 2025-06-15 RX ORDER — ONDANSETRON 4 MG/1
4 TABLET, ORALLY DISINTEGRATING ORAL EVERY 6 HOURS PRN
Qty: 12 TABLET | Refills: 0 | Status: SHIPPED | OUTPATIENT
Start: 2025-06-15

## 2025-06-15 RX ORDER — LACTULOSE 10 G/15ML
20 SOLUTION ORAL 2 TIMES DAILY PRN
Qty: 100 ML | Refills: 0 | Status: SHIPPED | OUTPATIENT
Start: 2025-06-15

## 2025-06-15 RX ORDER — IOPAMIDOL 612 MG/ML
85 INJECTION, SOLUTION INTRAVASCULAR
Status: COMPLETED | OUTPATIENT
Start: 2025-06-15 | End: 2025-06-15

## 2025-06-15 RX ORDER — HYDROMORPHONE HYDROCHLORIDE 1 MG/ML
0.5 INJECTION, SOLUTION INTRAMUSCULAR; INTRAVENOUS; SUBCUTANEOUS ONCE
Status: COMPLETED | OUTPATIENT
Start: 2025-06-15 | End: 2025-06-15

## 2025-06-15 RX ADMIN — HYDROMORPHONE HYDROCHLORIDE 1 MG: 1 INJECTION, SOLUTION INTRAMUSCULAR; INTRAVENOUS; SUBCUTANEOUS at 14:14

## 2025-06-15 RX ADMIN — HYDROMORPHONE HYDROCHLORIDE 0.5 MG: 1 INJECTION, SOLUTION INTRAMUSCULAR; INTRAVENOUS; SUBCUTANEOUS at 11:57

## 2025-06-15 RX ADMIN — ONDANSETRON 4 MG: 2 INJECTION INTRAMUSCULAR; INTRAVENOUS at 11:57

## 2025-06-15 RX ADMIN — IOPAMIDOL 85 ML: 612 INJECTION, SOLUTION INTRAVENOUS at 13:44

## 2025-06-15 NOTE — ED PROVIDER NOTES
[place]    EMERGENCY DEPARTMENT ENCOUNTER      Pt Name: Ludy Stock  MRN: [id number]  YOB: 1950  Date of evaluation: 6/15/2025  Provider: Carlos Melchor MD    CHIEF COMPLAINT       Chief Complaint   Patient presents with    Groin Pain         HISTORY OF PRESENT ILLNESS   Ludy Stock is a 74 y.o. female who presents to the emergency department with complaint of diffuse abdominal discomfort over the course the past day.  Patient with recent cardiac ablation with access via the right groin for atrial fibrillation.  She felt a lump in her abdomen.  She has had no issues with her groin site.  Denies any nausea, vomiting, diarrhea, fever, chills, or urinary symptoms.      Nursing notes were reviewed.    REVIEW OF SYSTEMS     ROS:  A chief complaint appropriate review of systems was completed and is negative except as noted in the HPI.      PAST MEDICAL HISTORY     Past Medical History:   Diagnosis Date    Coronary artery disease     Crohn's disease, small and large intestine     Diabetes mellitus     DVT (deep venous thrombosis) 1995    Fibromyalgia     Heart disease     Hyperlipidemia     Hypertension     Parathyroid disease     status post parathyroidectomy.    Peptic ulcer disease     PONV (postoperative nausea and vomiting)     PTSD (post-traumatic stress disorder)     Pulmonary hypertension     Stroke     Symptomatic bradycardia     Systemic lupus erythematosus     TIA (transient ischemic attack) 10/21/2024         SURGICAL HISTORY       Past Surgical History:   Procedure Laterality Date    BIOPSY ABDOMINAL MASS  09/2024    Benign    BLADDER RESECTION LAPAROSCOPIC      BREAST LUMPECTOMY      BUNIONECTOMY      BILATERAL     CARDIAC CATHETERIZATION N/A 12/06/2023    Procedure: Coronary angiography;  Surgeon: German Kelley MD;  Location: [facility] INVASIVE LOC[facility];  Service: Cardiovascular;  Laterality: N/A;    CARDIAC CATHETERIZATION N/A 12/06/2023    Procedure: Stent ISRAEL  coronary;  Surgeon: German Kelley MD;  Location:  [facility] INVASIVE LOCATION;  Service: Cardiovascular;  Laterality: N/A;    CARDIAC CATHETERIZATION N/A 12/06/2023    Procedure: Optical Coherence Tomography;  Surgeon: German Kelley MD;  Location:  [facility] INVASIVE LOCATION;  Service: Cardiovascular;  Laterality: N/A;    CARDIAC ELECTROPHYSIOLOGY PROCEDURE N/A 6/12/2025    Procedure: PVA (paroxysmal), Rythmia, PFA, DNS Eliquis, hold Mounjaro 1 week;  Surgeon: Paul Polo DO;  Location:  [facility] INVASIVE LOCATION;  Service: Cardiovascular;  Laterality: N/A;    CARPAL TUNNEL RELEASE Right     COLON RESECTION      Partial x2    COSMETIC SURGERY      Bilateral breast implants    HYSTERECTOMY      ICD GENERATOR REPLACEMENT N/A 03/30/2023    Procedure: ICD DC generator change, possible lead revision;  Surgeon: Paul Polo DO;  Location:  [facility] INVASIVE LOCATION;  Service: Cardiology;  Laterality: N/A;    TONSILLECTOMY           CURRENT MEDICATIONS     No current facility-administered medications for this encounter.    Current Outpatient Medications:     acetaminophen (TYLENOL) 500 MG tablet, TAKE 2 TABLETS ORAL ROUTE 4 TIMES PER DAY AS NEEDED FOR PAIN, Disp: , Rfl:     amLODIPine (NORVASC) 5 MG tablet, Take 1 tablet by mouth Daily., Disp: , Rfl:     apixaban (Eliquis) 5 MG tablet tablet, Take 1 tablet by mouth twice daily, Disp: 180 tablet, Rfl: 3    Aspirin 81 MG capsule, Take 1 capsule by mouth Daily., Disp: , Rfl:     clopidogrel (PLAVIX) 75 MG tablet, Take 1 tablet by mouth once daily, Disp: 90 tablet, Rfl: 3    Coenzyme Q10 (CoQ-10) 100 MG capsule, Take 1 capsule by mouth Daily., Disp: , Rfl:     furosemide (LASIX) 80 MG tablet, Take 1 tablet by mouth As Needed (as directed)., Disp: , Rfl:     levothyroxine (SYNTHROID, LEVOTHROID) 125 MCG tablet, Take 175 mcg by mouth Daily., Disp: , Rfl:     lidocaine (LIDODERM) 5 %, APPLY ONE PATCH TOPICALLY TO CLEAN, DRY SKIN. LEAVE ON FOR 12 HOURS THEN  REMOVE. MUST WAIT AT LEAST 12 HOURS BEFORE APPLYING PATCH(ES) AGAIN., Disp: , Rfl:     linaclotide (LINZESS) 290 MCG capsule capsule, Take 1 capsule by mouth Every Morning Before Breakfast., Disp: , Rfl:     lisinopril (PRINIVIL,ZESTRIL) 20 MG tablet, Take 1 tablet by mouth Daily., Disp: 30 tablet, Rfl: 11    meloxicam (MOBIC) 15 MG tablet, Take 1 tablet by mouth Daily., Disp: , Rfl:     metoprolol succinate XL (TOPROL-XL) 25 MG 24 hr tablet, Take 1 tablet by mouth Daily., Disp: 30 tablet, Rfl: 11    Mounjaro 5 MG/0.5ML solution auto-injector, Inject 1 syringe under the skin into the appropriate area as directed 1 (One) Time Per Week., Disp: , Rfl:     naloxone (NARCAN) 4 MG/0.1ML nasal spray, Take 1 spray by nasal route as directed., Disp: , Rfl:     nitroglycerin (NITROSTAT) 0.4 MG SL tablet, Place 1 tablet under the tongue Every 5 (Five) Minutes As Needed for Chest Pain., Disp: , Rfl:     oxybutynin XL (DITROPAN-XL) 10 MG 24 hr tablet, Take 1 tablet by mouth Daily., Disp: , Rfl:     oxyCODONE (ROXICODONE) 15 MG immediate release tablet, Take 1 tablet by mouth 4 (Four) Times a Day., Disp: , Rfl:     potassium chloride (KLOR-CON) 20 MEQ packet, TAKE 1 PACKET ORAL ROUTE ONCE DAILY FOR 3 DAYS IN 6 (SIX) OUNCES OF WATER OR JUICE TAKE AFTER MEAL, Disp: , Rfl:     rosuvastatin (CRESTOR) 20 MG tablet, TAKE 1 TABLET BY MOUTH ONCE DAILY AT NIGHT, Disp: 90 tablet, Rfl: 1    lactulose (CHRONULAC) 10 GM/15ML solution, Take 30 mL by mouth 2 (Two) Times a Day As Needed (constipation)., Disp: 100 mL, Rfl: 0    ondansetron ODT (ZOFRAN-ODT) 4 MG disintegrating tablet, Take 1 tablet by mouth Every 6 (Six) Hours As Needed for Nausea or Vomiting., Disp: 12 tablet, Rfl: 0    ALLERGIES     Morphine    FAMILY HISTORY       Family History   Problem Relation Age of Onset    Cancer Mother     Heart attack Father     Kidney cancer Father         Metastasized    Kidney cancer Brother         Metastasized          SOCIAL HISTORY       Social  History     Socioeconomic History    Marital status:    Tobacco Use    Smoking status: Never     Passive exposure: Past    Smokeless tobacco: Never   Vaping Use    Vaping status: Never Used   Substance and Sexual Activity    Alcohol use: Not Currently    Drug use: Never    Sexual activity: Defer         PHYSICAL EXAM    (up to 7 for level 4, 8 or more for level 5)     Vitals:    06/15/25 1300 06/15/25 1358 06/15/25 1401 06/15/25 1418   BP: 124/62 148/63  140/74   BP Location:       Patient Position:       Pulse: 70  73 70   Resp:    16   Temp:       TempSrc:       SpO2: 92%  97% 95%   Weight:       Height:           General: Awake, alert, no acute distress.  HEENT: Conjunctivae normal.  Neck: Trachea midline.  Cardiac: Heart regular rate, rhythm, no murmurs, rubs, or gallops  Lungs: Lungs are clear to auscultation, there is no wheezing, rhonchi, or rales. There is no use of accessory muscles.  Abdomen: There is mild diffuse abdominal tenderness.  There is no distention, rebound, or guarding  Musculoskeletal: No deformity.  Neuro: Alert   Dermatology: Skin is warm and dry.  Patient right groin access site is well-appearing.  Scant amount of bruising but no tenderness, induration, or swelling  Psych: Mentation is grossly normal, cognition is grossly normal. Affect is appropriate.        DIAGNOSTIC RESULTS     EKG: All EKGs are interpreted by the Emergency Department Physician who either signs or Co-signs this chart in the absence of a cardiologist.    No orders to display         RADIOLOGY:   [x] Radiologist's Report Reviewed:  CT Abdomen Pelvis With Contrast   Final Result   1.Induration of the subcutaneous fat along the right inguinal region compatible with recent access. No evidence of extravasation or rim-enhancing fluid collection.   2.No acute intra-abdominal or intrapelvic abnormality noted..   3.Heavy stool burden noted. No inflammatory changes noted of the GI tract.   4.Other incidental findings as  above.            Electronically Signed: Bean Ureña MD     6/15/2025 2:10 PM EDT     Workstation ID: [id number]          I ordered and independently reviewed the above noted radiographic studies.        LABS:    I have reviewed and interpreted all of the currently available lab results from this visit (if applicable):  Results for orders placed or performed during the hospital encounter of 06/15/25   Urinalysis With Microscopic If Indicated (No Culture) - Urine, Clean Catch    Collection Time: 06/15/25 11:49 AM    Specimen: Urine, Clean Catch   Result Value Ref Range    Color, UA Yellow Yellow, Straw    Appearance, UA Clear Clear    pH, UA 8.0 5.0 - 8.0    Specific Gravity, UA 1.011 1.001 - 1.030    Glucose, UA Negative Negative    Ketones, UA Negative Negative    Bilirubin, UA Negative Negative    Blood, UA Negative Negative    Protein, UA Negative Negative    Leuk Esterase, UA Small (1+) (A) Negative    Nitrite, UA Negative Negative    Urobilinogen, UA 1.0 E.U./dL 0.2 - 1.0 E.U./dL   Urinalysis, Microscopic Only - Urine, Clean Catch    Collection Time: 06/15/25 11:49 AM    Specimen: Urine, Clean Catch   Result Value Ref Range    RBC, UA 0-2 None Seen, 0-2 /HPF    WBC, UA 0-2 None Seen, 0-2 /HPF    Bacteria, UA None Seen None Seen, Trace /HPF    Squamous Epithelial Cells, UA 3-6 (A) None Seen, 0-2 /HPF    Hyaline Casts, UA None Seen 0 - 6 /LPF    Methodology Automated Microscopy    Comprehensive Metabolic Panel    Collection Time: 06/15/25 11:56 AM    Specimen: Blood   Result Value Ref Range    Glucose 105 (H) 65 - 99 mg/dL    BUN 10.5 8.0 - 23.0 mg/dL    Creatinine 0.93 0.57 - 1.00 mg/dL    Sodium 143 136 - 145 mmol/L    Potassium 4.0 3.5 - 5.2 mmol/L    Chloride 104 98 - 107 mmol/L    CO2 28.0 22.0 - 29.0 mmol/L    Calcium 10.4 8.6 - 10.5 mg/dL    Total Protein 6.3 6.0 - 8.5 g/dL    Albumin 4.3 3.5 - 5.2 g/dL    ALT (SGPT) 19 1 - 33 U/L    AST (SGOT) 43 (H) 1 - 32 U/L    Alkaline Phosphatase 85 39 - 117 U/L     Total Bilirubin 0.5 0.0 - 1.2 mg/dL    Globulin 2.0 gm/dL    A/G Ratio 2.2 g/dL    BUN/Creatinine Ratio 11.3 7.0 - 25.0    Anion Gap 11.0 5.0 - 15.0 mmol/L    eGFR 64.2 >60.0 mL/min/1.73   Lipase    Collection Time: 06/15/25 11:56 AM    Specimen: Blood   Result Value Ref Range    Lipase 15 13 - 60 U/L   CBC Auto Differential    Collection Time: 06/15/25 11:56 AM    Specimen: Blood   Result Value Ref Range    WBC 5.37 3.40 - 10.80 10*3/mm3    RBC 4.17 3.77 - 5.28 10*6/mm3    Hemoglobin 12.2 12.0 - 15.9 g/dL    Hematocrit 36.8 34.0 - 46.6 %    MCV 88.2 79.0 - 97.0 fL    MCH 29.3 26.6 - 33.0 pg    MCHC 33.2 31.5 - 35.7 g/dL    RDW 13.8 12.3 - 15.4 %    RDW-SD 44.8 37.0 - 54.0 fl    MPV 9.4 6.0 - 12.0 fL    Platelets 294 140 - 450 10*3/mm3    Neutrophil % 48.3 42.7 - 76.0 %    Lymphocyte % 29.2 19.6 - 45.3 %    Monocyte % 11.5 5.0 - 12.0 %    Eosinophil % 10.1 (H) 0.3 - 6.2 %    Basophil % 0.7 0.0 - 1.5 %    Immature Grans % 0.2 0.0 - 0.5 %    Neutrophils, Absolute 2.59 1.70 - 7.00 10*3/mm3    Lymphocytes, Absolute 1.57 0.70 - 3.10 10*3/mm3    Monocytes, Absolute 0.62 0.10 - 0.90 10*3/mm3    Eosinophils, Absolute 0.54 (H) 0.00 - 0.40 10*3/mm3    Basophils, Absolute 0.04 0.00 - 0.20 10*3/mm3    Immature Grans, Absolute 0.01 0.00 - 0.05 10*3/mm3    nRBC 0.0 0.0 - 0.2 /100 WBC        If labs were ordered, I independently reviewed the results and considered them in treating the patient.      EMERGENCY DEPARTMENT COURSE and DIFFERENTIAL DIAGNOSIS/MDM:   Vitals:  AS OF 16:56 EDT    BP - 140/74  HR - 70  TEMP - 98.2 °F (36.8 °C) (Oral)  O2 SATS - 95%        Discussion below represents my analysis of pertinent findings related to patient's condition, differential diagnosis, treatment plan and final disposition.      Differential diagnosis:  The differential diagnosis associated with the patient's presentation includes: Biliary pathology, pancreatitis, bowel obstruction, mesenteric ischemia, retroperitoneal hematoma, AAA,  diverticulitis, appendicitis, urinary tract infection      Independent interpretations (ECG/rhythm strip/X-ray/US/CT scan): I independently interpreted the patient's abdominal CT and cardiac monitor.  Patient in sinus rhythm, there is no obstructive change within the abdomen.      Additional sources:  Discussed/obtained information from independent historians:   [] Spouse:   [] Parent:   [] Friend:   [x] EMS: Report was taken from EMS, vital signs stable during transport.   [] Other:  External (non-ED) record review:   [] Inpatient record:   [] Office record:   [] Outpatient record:   [] Prior Outpatient labs:   [] Prior Outpatient radiology:   [] Primary Care record:   [] Outside ED record:   [] Other:       Patient's care impacted by:   [] Diabetes   [] Hypertension   [x] Coronary Artery Disease   [] Cancer   [x] Other: Pulmonary hypertension    Care significantly affected by Social Determinants of Health (housing and economic circumstances, unemployment)    [] Yes     [x] No   If yes, Patient's care significantly limited by  Social Determinants of Health including:    [] Inadequate housing    [] Low income    [] Alcoholism and drug addiction in family    [] Problems related to primary support group    [] Unemployment    [] Problems related to employment    [] Other Social Determinants of Health:     I considered prescription management with:    [x] Pain medication: Patient given IV Dilaudid with improvement in pain   [] Antiviral:   [] Antibiotic:   [] Other:      ED Course:    ED Course as of 06/17/25 1656   Sun León 15, 2025   1501 On reexamination, the patient is asleep.  Vital signs remained within normal limits.  She had some lower abdominal discomfort and tenseness on initial examination but this is not resolved.  Abdominal CT reviewed and demonstrates no surgical process, vascular emergency, or area of inflammation/infection.  There is no retroperitoneal hematoma.  Patient's right groin site evaluated and  is benign in appearance.  She has a scant amount of bruising but no swelling, induration, erythema to the area to suggest superimposed infection, pseudoaneurysm.  Labs reassuring without any significant leukocytosis, hepatic and pancreatic inflammation, or evidence of urinary tract infection.  Patient stable for discharge home with outpatient follow-up. [NS]      ED Course User Index  [NS] Carlos Melchor MD         I had a discussion with the patient/family regarding diagnosis, diagnostic results, treatment plan, and medications.  The patient/family indicated understanding of these instructions.  I spent adequate time at the bedside preceding discharge necessary to personally discuss the aftercare instructions, giving patient education, providing explanations of the results of our evaluations/findings, and my decision making to assure that the patient/family understand the plan of care.  Time was allotted to answer questions at that time and throughout the ED course.  Emphasis was placed on timely follow-up after discharge.  I also discussed the potential for the development of an acute emergent condition requiring further evaluation, admission, or even surgical intervention. I discussed that we found nothing during the visit today indicating the need for further workup, admission, or the presence of an unstable medical condition.  I encouraged the patient to return to the emergency department immediately for ANY concerns, worsening, new complaints, or if symptoms persist and unable to seek follow-up in a timely fashion.  The patient/family expressed understanding and agreement with this plan.  The patient will follow-up with their PCP in 1-2 days for reevaluation.           PROCEDURES:  Procedures    CRITICAL CARE TIME        FINAL IMPRESSION      1. Acute bilateral lower abdominal pain    2. Status post ablation of atrial fibrillation    3. History of Crohn's disease    4. History of diabetes mellitus           DISPOSITION/PLAN     ED Disposition       ED Disposition   Discharge    Condition   Stable    Comment   --                 Comment: Please note this report has been produced using speech recognition software.      Carlos Melchor MD  Attending Emergency Physician             Carlos Melchor MD  06/17/25 1492

## 2025-06-20 PROCEDURE — 93294 REM INTERROG EVL PM/LDLS PM: CPT | Performed by: INTERNAL MEDICINE

## 2025-06-20 PROCEDURE — 93296 REM INTERROG EVL PM/IDS: CPT | Performed by: INTERNAL MEDICINE

## 2025-06-23 LAB
MDC_IDC_MSMT_BATTERY_REMAINING_LONGEVITY: 92 MO
MDC_IDC_MSMT_BATTERY_REMAINING_PERCENTAGE: 85 %
MDC_IDC_MSMT_BATTERY_RRT_TRIGGER: 2.6
MDC_IDC_MSMT_BATTERY_STATUS: NORMAL
MDC_IDC_MSMT_BATTERY_VOLTAGE: 2.99
MDC_IDC_MSMT_LEADCHNL_RA_IMPEDANCE_VALUE: 340
MDC_IDC_MSMT_LEADCHNL_RA_PACING_THRESHOLD_POLARITY: NORMAL
MDC_IDC_MSMT_LEADCHNL_RA_SENSING_INTR_AMPL: 0.7
MDC_IDC_PG_IMPLANT_DTM: NORMAL
MDC_IDC_PG_MFG: NORMAL
MDC_IDC_PG_MODEL: NORMAL
MDC_IDC_PG_SERIAL: NORMAL
MDC_IDC_PG_TYPE: NORMAL
MDC_IDC_SESS_DTM: NORMAL
MDC_IDC_SESS_TYPE: NORMAL
MDC_IDC_SET_BRADY_AT_MODE_SWITCH_RATE: 150
MDC_IDC_SET_BRADY_LOWRATE: 70
MDC_IDC_SET_BRADY_MAX_SENSOR_RATE: 115
MDC_IDC_SET_BRADY_MODE: NORMAL
MDC_IDC_SET_LEADCHNL_RA_PACING_AMPLITUDE: 2
MDC_IDC_SET_LEADCHNL_RA_PACING_POLARITY: NORMAL
MDC_IDC_SET_LEADCHNL_RA_PACING_PULSEWIDTH: 0.5
MDC_IDC_SET_LEADCHNL_RA_SENSING_POLARITY: NORMAL
MDC_IDC_SET_LEADCHNL_RA_SENSING_SENSITIVITY: 0.5
MDC_IDC_SET_LEADCHNL_RV_PACING_POLARITY: NORMAL
MDC_IDC_SET_LEADCHNL_RV_SENSING_POLARITY: NORMAL
MDC_IDC_STAT_AT_BURDEN_PERCENT: 0
MDC_IDC_STAT_BRADY_RA_PERCENT_PACED: 99

## 2025-07-15 ENCOUNTER — TELEPHONE (OUTPATIENT)
Dept: CARDIOLOGY | Facility: HOSPITAL | Age: 75
End: 2025-07-15
Payer: MEDICARE

## 2025-07-15 NOTE — TELEPHONE ENCOUNTER
Left a voice mail for the patient to call our office for a new appointment as they were a no show today.

## 2025-08-12 RX ORDER — ROSUVASTATIN CALCIUM 20 MG/1
20 TABLET, COATED ORAL NIGHTLY
Qty: 90 TABLET | Refills: 0 | Status: SHIPPED | OUTPATIENT
Start: 2025-08-12

## 2025-08-22 ENCOUNTER — TELEPHONE (OUTPATIENT)
Dept: CARDIOLOGY | Facility: CLINIC | Age: 75
End: 2025-08-22
Payer: MEDICARE

## (undated) DEVICE — DOME MONITORING W BONDED STPCK BIOTRANS2

## (undated) DEVICE — DRSNG TELFA PAD NONADH STR 1S 3X8IN

## (undated) DEVICE — ADULT, W/LG. BACK PAD, RADIOTRANSPARENT ELEMENT AND LEAD WIRE COMPATIBLE W/: Brand: DEFIBRILLATION ELECTRODES

## (undated) DEVICE — Device: Brand: MEDEX

## (undated) DEVICE — GW PERIPH GUIDERIGHT STD/EXCHNG/J/TIP SS 0.035IN 5X260CM

## (undated) DEVICE — SOL NACL 0.9PCT 1000ML

## (undated) DEVICE — MODEL BT2000 P/N 700287-012KIT CONTENTS: MANIFOLD WITH SALINE AND CONTRAST PORTS, SALINE TUBING WITH SPIKE AND HAND SYRINGE, TRANSDUCER: Brand: BT2000 AUTOMATED MANIFOLD KIT

## (undated) DEVICE — GUIDE CATHETER: Brand: MACH1™

## (undated) DEVICE — CATHETER CONNECTION CABLE: Brand: FARASTAR™

## (undated) DEVICE — CAUTERY TIP POLISHER: Brand: DEVON

## (undated) DEVICE — GLIDESHEATH SLENDER STAINLESS STEEL KIT: Brand: GLIDESHEATH SLENDER

## (undated) DEVICE — DEV COMPR RADL PRELUDESYNCEZ 30ML 32CM

## (undated) DEVICE — SET PRIMARY GRVTY 10DP MALE LL 104IN

## (undated) DEVICE — ACUMEN IQ CUFF ADULT: Brand: ACUMEN IQ CUFF ADULT

## (undated) DEVICE — 1 X VERSACROSS CONNECT TRANSSEPTAL DILATOR;  1 X VERSACROSS RF WIRE (INCLUDING 1 X CONNECTOR CABLE (SINGLE USE)): Brand: VERSACROSS CONNECT ACCESS SOLUTION FOR FARADRIVE

## (undated) DEVICE — PLASMABLADE PS210-030S 3.0S LOCK: Brand: PLASMABLADE™

## (undated) DEVICE — STEERABLE SHEATH CLEAR: Brand: FARADRIVE™

## (undated) DEVICE — Device: Brand: WEBSTER CS

## (undated) DEVICE — ST INF PRI SMRTSTE 20DRP 2VLV 24ML 117

## (undated) DEVICE — MODEL AT P65, P/N 701554-001KIT CONTENTS: HAND CONTROLLER, 3-WAY HIGH-PRESSURE STOPCOCK WITH ROTATING END AND PREMIUM HIGH-PRESSURE TUBING: Brand: ANGIOTOUCH® KIT

## (undated) DEVICE — DECANT BG O JET

## (undated) DEVICE — PINNACLE INTRODUCER SHEATH: Brand: PINNACLE

## (undated) DEVICE — LEX ELECTRO PHYSIOLOGY: Brand: MEDLINE INDUSTRIES, INC.

## (undated) DEVICE — DEV INFL MONARCH 25W

## (undated) DEVICE — CANN NASL CO2 DIVIDED A/

## (undated) DEVICE — CATH DIAG EXPO .045 FL3  5F 100CM

## (undated) DEVICE — NC TREK NEO™ CORONARY DILATATION CATHETER 3.50 MM X 15 MM / RAPID-EXCHANGE: Brand: NC TREK NEO™

## (undated) DEVICE — ST EXT IV SMRTSTE 2VLV FIX M LL 6ML 41

## (undated) DEVICE — CVR PROB ULTRASND/TRANSD W/GEL 7X11IN STRL

## (undated) DEVICE — DRSNG SURESITE123 4X4.8IN

## (undated) DEVICE — ELECTRD RETRN/GRND MEGADYNE SGL/PLT W/CORD 9FT DISP

## (undated) DEVICE — CATH ULTRASND ECHO ACUNAV FOR ACUSON 8F 90CM

## (undated) DEVICE — NC TREK NEO™ CORONARY DILATATION CATHETER 2.50 X 15 MM / RAPID-EXCHANGE: Brand: NC TREK NEO™

## (undated) DEVICE — CATH DIAG EXPO M/ PK 5F FL4/FR4 PIG

## (undated) DEVICE — KT MANIFLD EP

## (undated) DEVICE — NC TREK NEO™ CORONARY DILATATION CATHETER 4.00 MM X 8 MM / RAPID-EXCHANGE: Brand: NC TREK NEO™

## (undated) DEVICE — MEDI-VAC YANKAUER SUCTION HANDLE W/BULBOUS TIP: Brand: CARDINAL HEALTH

## (undated) DEVICE — HI-TORQUE VERSACORE MODIFIED J GUIDE WIRE SYSTEM 260 CM: Brand: HI-TORQUE VERSACORE

## (undated) DEVICE — LIMB HOLDER, WRIST/ANKLE: Brand: DEROYAL

## (undated) DEVICE — LOCATION REFERENCE PATCH KIT: Brand: RHYTHMIA™ MAPPING SYSTEM

## (undated) DEVICE — PRESSURE MONITORING SET: Brand: TRUWAVE

## (undated) DEVICE — Device: Brand: ASAHI SION BLUE

## (undated) DEVICE — STERILE (15.2 TAPERED TO 7.6 X 183CM) POLYETHYLENE ACCORDION-FOLDED COVER FOR USE WITH SIEMENS ACUNAV ULTRASOUND CATHETER FAMILY CONNECTOR: Brand: SWIFTLINK TRANSDUCER COVER

## (undated) DEVICE — TUBING, SUCTION, 1/4" X 10', STRAIGHT: Brand: MEDLINE

## (undated) DEVICE — GUIDELINER CATHETERS ARE INTENDED TO BE USED IN CONJUNCTION WITH GUIDE CATHETERS TO ACCESS DISCRETE REGIONS OF THE CORONARY AND/OR PERIPHERAL VASCULATURE, AND TO FACILITATE PLACEMENT OF INTERVENTIONAL DEVICES.: Brand: GUIDELINER® V3 CATHETER

## (undated) DEVICE — PULSED FIELD ABLATION CATHETER: Brand: FARAWAVE™ NAV

## (undated) DEVICE — PK CATH CARD 10

## (undated) DEVICE — ST EXT IV SMARTSITE PINCH/CLMP 5ML 46CM

## (undated) DEVICE — KT CATH IMG DRAGONFLY/OPSTAR 2.7F 135CM